# Patient Record
Sex: FEMALE | Race: WHITE | NOT HISPANIC OR LATINO | Employment: OTHER | URBAN - METROPOLITAN AREA
[De-identification: names, ages, dates, MRNs, and addresses within clinical notes are randomized per-mention and may not be internally consistent; named-entity substitution may affect disease eponyms.]

---

## 2017-01-01 ENCOUNTER — APPOINTMENT (OUTPATIENT)
Dept: GENERAL RADIOLOGY | Facility: HOSPITAL | Age: 82
End: 2017-01-01

## 2017-01-01 ENCOUNTER — HOSPITAL ENCOUNTER (INPATIENT)
Facility: HOSPITAL | Age: 82
LOS: 10 days | End: 2017-06-08
Attending: FAMILY MEDICINE | Admitting: FAMILY MEDICINE

## 2017-01-01 ENCOUNTER — APPOINTMENT (OUTPATIENT)
Dept: CT IMAGING | Facility: HOSPITAL | Age: 82
End: 2017-01-01

## 2017-01-01 ENCOUNTER — TELEPHONE (OUTPATIENT)
Dept: NEUROSURGERY | Facility: CLINIC | Age: 82
End: 2017-01-01

## 2017-01-01 ENCOUNTER — APPOINTMENT (OUTPATIENT)
Dept: MRI IMAGING | Facility: HOSPITAL | Age: 82
End: 2017-01-01

## 2017-01-01 ENCOUNTER — OFFICE VISIT (OUTPATIENT)
Dept: NEUROLOGY | Facility: CLINIC | Age: 82
End: 2017-01-01

## 2017-01-01 ENCOUNTER — TELEPHONE (OUTPATIENT)
Dept: INTERNAL MEDICINE | Facility: CLINIC | Age: 82
End: 2017-01-01

## 2017-01-01 ENCOUNTER — OFFICE VISIT (OUTPATIENT)
Dept: INTERNAL MEDICINE | Facility: CLINIC | Age: 82
End: 2017-01-01

## 2017-01-01 ENCOUNTER — HOSPITAL ENCOUNTER (INPATIENT)
Facility: HOSPITAL | Age: 82
LOS: 2 days | Discharge: HOME HEALTH | End: 2017-04-14
Attending: INTERNAL MEDICINE | Admitting: INTERNAL MEDICINE

## 2017-01-01 ENCOUNTER — HOSPITAL ENCOUNTER (OUTPATIENT)
Dept: CT IMAGING | Facility: HOSPITAL | Age: 82
Discharge: HOME | End: 2017-04-12
Attending: INTERNAL MEDICINE

## 2017-01-01 ENCOUNTER — HOSPITAL ENCOUNTER (EMERGENCY)
Facility: HOSPITAL | Age: 82
Discharge: HOME | End: 2017-05-11
Attending: EMERGENCY MEDICINE | Admitting: EMERGENCY MEDICINE

## 2017-01-01 ENCOUNTER — HOSPITAL ENCOUNTER (OUTPATIENT)
Dept: CT IMAGING | Facility: HOSPITAL | Age: 82
Discharge: HOME | End: 2017-01-17
Admitting: INTERNAL MEDICINE

## 2017-01-01 ENCOUNTER — HOSPITAL ENCOUNTER (INPATIENT)
Facility: HOSPITAL | Age: 82
LOS: 3 days | Discharge: NURSING, REHAB OR LONG-TERM CARE | End: 2017-04-20
Attending: EMERGENCY MEDICINE | Admitting: HOSPITALIST

## 2017-01-01 ENCOUNTER — HOSPITAL ENCOUNTER (EMERGENCY)
Facility: HOSPITAL | Age: 82
Discharge: HOME | End: 2017-01-11
Attending: EMERGENCY MEDICINE | Admitting: EMERGENCY MEDICINE

## 2017-01-01 ENCOUNTER — TELEPHONE (OUTPATIENT)
Dept: SOCIAL WORK | Facility: HOSPITAL | Age: 82
End: 2017-01-01

## 2017-01-01 ENCOUNTER — HOSPITAL ENCOUNTER (EMERGENCY)
Facility: HOSPITAL | Age: 82
Discharge: HOME | End: 2017-04-10
Attending: EMERGENCY MEDICINE | Admitting: EMERGENCY MEDICINE

## 2017-01-01 ENCOUNTER — TELEPHONE (OUTPATIENT)
Dept: NEUROLOGY | Facility: CLINIC | Age: 82
End: 2017-01-01

## 2017-01-01 ENCOUNTER — HOSPITAL ENCOUNTER (INPATIENT)
Facility: HOSPITAL | Age: 82
LOS: 4 days | End: 2017-05-29
Attending: EMERGENCY MEDICINE | Admitting: INTERNAL MEDICINE

## 2017-01-01 VITALS
DIASTOLIC BLOOD PRESSURE: 80 MMHG | WEIGHT: 108 LBS | BODY MASS INDEX: 18.44 KG/M2 | HEART RATE: 64 BPM | HEIGHT: 64 IN | OXYGEN SATURATION: 99 % | SYSTOLIC BLOOD PRESSURE: 134 MMHG | RESPIRATION RATE: 18 BRPM

## 2017-01-01 VITALS
HEART RATE: 90 BPM | BODY MASS INDEX: 18.95 KG/M2 | SYSTOLIC BLOOD PRESSURE: 136 MMHG | HEIGHT: 64 IN | WEIGHT: 111 LBS | RESPIRATION RATE: 18 BRPM | OXYGEN SATURATION: 97 % | DIASTOLIC BLOOD PRESSURE: 88 MMHG

## 2017-01-01 VITALS
DIASTOLIC BLOOD PRESSURE: 83 MMHG | HEIGHT: 66 IN | TEMPERATURE: 98 F | RESPIRATION RATE: 16 BRPM | HEART RATE: 81 BPM | SYSTOLIC BLOOD PRESSURE: 129 MMHG | OXYGEN SATURATION: 95 % | WEIGHT: 106 LBS | BODY MASS INDEX: 17.04 KG/M2

## 2017-01-01 VITALS
HEIGHT: 66 IN | RESPIRATION RATE: 18 BRPM | SYSTOLIC BLOOD PRESSURE: 122 MMHG | DIASTOLIC BLOOD PRESSURE: 72 MMHG | OXYGEN SATURATION: 93 % | WEIGHT: 109.3 LBS | TEMPERATURE: 97.8 F | HEART RATE: 106 BPM | BODY MASS INDEX: 17.57 KG/M2

## 2017-01-01 VITALS
OXYGEN SATURATION: 95 % | WEIGHT: 110 LBS | HEIGHT: 66 IN | RESPIRATION RATE: 18 BRPM | TEMPERATURE: 97.8 F | DIASTOLIC BLOOD PRESSURE: 72 MMHG | SYSTOLIC BLOOD PRESSURE: 126 MMHG | BODY MASS INDEX: 17.68 KG/M2 | HEART RATE: 75 BPM

## 2017-01-01 VITALS
SYSTOLIC BLOOD PRESSURE: 124 MMHG | RESPIRATION RATE: 18 BRPM | OXYGEN SATURATION: 97 % | BODY MASS INDEX: 17.68 KG/M2 | HEIGHT: 66 IN | HEART RATE: 89 BPM | WEIGHT: 110 LBS | DIASTOLIC BLOOD PRESSURE: 60 MMHG

## 2017-01-01 VITALS
SYSTOLIC BLOOD PRESSURE: 152 MMHG | BODY MASS INDEX: 16.88 KG/M2 | HEART RATE: 74 BPM | DIASTOLIC BLOOD PRESSURE: 71 MMHG | RESPIRATION RATE: 18 BRPM | WEIGHT: 105 LBS | OXYGEN SATURATION: 97 % | TEMPERATURE: 98 F | HEIGHT: 66 IN

## 2017-01-01 VITALS
RESPIRATION RATE: 16 BRPM | BODY MASS INDEX: 20.89 KG/M2 | DIASTOLIC BLOOD PRESSURE: 70 MMHG | HEIGHT: 66 IN | TEMPERATURE: 97.7 F | OXYGEN SATURATION: 94 % | WEIGHT: 130 LBS | HEART RATE: 68 BPM | SYSTOLIC BLOOD PRESSURE: 122 MMHG

## 2017-01-01 VITALS
WEIGHT: 115 LBS | HEART RATE: 59 BPM | OXYGEN SATURATION: 98 % | HEIGHT: 64 IN | BODY MASS INDEX: 19.63 KG/M2 | DIASTOLIC BLOOD PRESSURE: 80 MMHG | TEMPERATURE: 98.2 F | RESPIRATION RATE: 19 BRPM | SYSTOLIC BLOOD PRESSURE: 156 MMHG

## 2017-01-01 VITALS
SYSTOLIC BLOOD PRESSURE: 128 MMHG | OXYGEN SATURATION: 99 % | WEIGHT: 106 LBS | HEIGHT: 64 IN | DIASTOLIC BLOOD PRESSURE: 70 MMHG | HEART RATE: 75 BPM | BODY MASS INDEX: 18.1 KG/M2

## 2017-01-01 VITALS
TEMPERATURE: 98.6 F | HEART RATE: 93 BPM | SYSTOLIC BLOOD PRESSURE: 122 MMHG | BODY MASS INDEX: 17.04 KG/M2 | DIASTOLIC BLOOD PRESSURE: 82 MMHG | OXYGEN SATURATION: 94 % | RESPIRATION RATE: 16 BRPM | HEIGHT: 66 IN | WEIGHT: 106 LBS

## 2017-01-01 DIAGNOSIS — N18.30 CHRONIC KIDNEY DISEASE, STAGE III (MODERATE) (HCC): ICD-10-CM

## 2017-01-01 DIAGNOSIS — R07.89 RIGHT-SIDED CHEST WALL PAIN: Primary | ICD-10-CM

## 2017-01-01 DIAGNOSIS — S32.10XA CLOSED FRACTURE OF SACRUM, UNSPECIFIED PORTION OF SACRUM, INITIAL ENCOUNTER (HCC): ICD-10-CM

## 2017-01-01 DIAGNOSIS — I65.23 CALCIFICATION OF BOTH CAROTID ARTERIES: ICD-10-CM

## 2017-01-01 DIAGNOSIS — S22.000A THORACIC COMPRESSION FRACTURE, CLOSED, INITIAL ENCOUNTER (HCC): Primary | ICD-10-CM

## 2017-01-01 DIAGNOSIS — F03.90 DEMENTIA WITHOUT BEHAVIORAL DISTURBANCE, UNSPECIFIED DEMENTIA TYPE: ICD-10-CM

## 2017-01-01 DIAGNOSIS — M62.81 MUSCLE WEAKNESS (GENERALIZED): ICD-10-CM

## 2017-01-01 DIAGNOSIS — R53.83 OTHER FATIGUE: ICD-10-CM

## 2017-01-01 DIAGNOSIS — M54.6 CHRONIC THORACIC BACK PAIN, UNSPECIFIED BACK PAIN LATERALITY: Primary | ICD-10-CM

## 2017-01-01 DIAGNOSIS — R74.8 ELEVATED LIPASE: ICD-10-CM

## 2017-01-01 DIAGNOSIS — R10.2 PELVIC PAIN: Primary | ICD-10-CM

## 2017-01-01 DIAGNOSIS — M54.50 ACUTE RIGHT-SIDED LOW BACK PAIN WITHOUT SCIATICA: Primary | ICD-10-CM

## 2017-01-01 DIAGNOSIS — D72.829 LEUKOCYTOSIS, UNSPECIFIED TYPE: ICD-10-CM

## 2017-01-01 DIAGNOSIS — R53.1 WEAKNESS: ICD-10-CM

## 2017-01-01 DIAGNOSIS — R26.81 GAIT INSTABILITY: ICD-10-CM

## 2017-01-01 DIAGNOSIS — N30.01 ACUTE CYSTITIS WITH HEMATURIA: ICD-10-CM

## 2017-01-01 DIAGNOSIS — G31.84 MILD COGNITIVE IMPAIRMENT: ICD-10-CM

## 2017-01-01 DIAGNOSIS — R53.1 GENERALIZED WEAKNESS: ICD-10-CM

## 2017-01-01 DIAGNOSIS — R42 DIZZINESS: Primary | ICD-10-CM

## 2017-01-01 DIAGNOSIS — S61.411A HAND LACERATION, RIGHT, INITIAL ENCOUNTER: ICD-10-CM

## 2017-01-01 DIAGNOSIS — G44.86 CERVICOGENIC HEADACHE: ICD-10-CM

## 2017-01-01 DIAGNOSIS — Z74.09 IMPAIRED FUNCTIONAL MOBILITY, BALANCE, GAIT, AND ENDURANCE: ICD-10-CM

## 2017-01-01 DIAGNOSIS — R73.02 IMPAIRED GLUCOSE TOLERANCE: ICD-10-CM
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
## 2017-01-01 DIAGNOSIS — Z74.09 IMPAIRED MOBILITY AND ADLS: ICD-10-CM

## 2017-01-01 DIAGNOSIS — S22.060A TRAUMATIC COMPRESSION FRACTURE OF T8 THORACIC VERTEBRA, CLOSED, INITIAL ENCOUNTER (HCC): Primary | ICD-10-CM

## 2017-01-01 DIAGNOSIS — R42 DIZZINESS: ICD-10-CM

## 2017-01-01 DIAGNOSIS — J84.10 PULMONARY FIBROSIS (HCC): ICD-10-CM

## 2017-01-01 DIAGNOSIS — R79.89 ELEVATED LFTS: ICD-10-CM

## 2017-01-01 DIAGNOSIS — M54.6 ACUTE BILATERAL THORACIC BACK PAIN: ICD-10-CM

## 2017-01-01 DIAGNOSIS — G89.29 CHRONIC THORACIC BACK PAIN, UNSPECIFIED BACK PAIN LATERALITY: Primary | ICD-10-CM

## 2017-01-01 DIAGNOSIS — R41.82 ALTERED MENTAL STATUS, UNSPECIFIED ALTERED MENTAL STATUS TYPE: Primary | ICD-10-CM

## 2017-01-01 DIAGNOSIS — Z78.9 IMPAIRED MOBILITY AND ADLS: ICD-10-CM

## 2017-01-01 DIAGNOSIS — I77.9 VERTEBRAL ARTERY DISEASE (HCC): Primary | ICD-10-CM

## 2017-01-01 DIAGNOSIS — R10.84 GENERALIZED ABDOMINAL PAIN: ICD-10-CM

## 2017-01-01 DIAGNOSIS — Z00.00 HEALTHCARE MAINTENANCE: Primary | ICD-10-CM
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
## 2017-01-01 DIAGNOSIS — M54.2 NECK PAIN: ICD-10-CM
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
## 2017-01-01 DIAGNOSIS — R07.89 CHEST WALL PAIN: Primary | ICD-10-CM

## 2017-01-01 DIAGNOSIS — R13.10 DYSPHAGIA, UNSPECIFIED TYPE: ICD-10-CM

## 2017-01-01 DIAGNOSIS — S32.592A FRACTURE OF MULTIPLE PUBIC RAMI, LEFT, CLOSED, INITIAL ENCOUNTER (HCC): Primary | ICD-10-CM

## 2017-01-01 LAB
ALBUMIN SERPL-MCNC: 2.5 G/DL (ref 3.5–5.2)
ALBUMIN SERPL-MCNC: 2.6 G/DL (ref 3.5–5.2)
ALBUMIN SERPL-MCNC: 2.6 G/DL (ref 3.5–5.2)
ALBUMIN SERPL-MCNC: 3.1 G/DL (ref 3.5–5.2)
ALBUMIN SERPL-MCNC: 3.3 G/DL (ref 3.5–5.2)
ALBUMIN SERPL-MCNC: 3.4 G/DL (ref 3.2–4.8)
ALBUMIN SERPL-MCNC: 3.5 G/DL (ref 3.2–4.8)
ALBUMIN SERPL-MCNC: 3.5 G/DL (ref 3.5–5.2)
ALBUMIN SERPL-MCNC: 4 G/DL (ref 3.5–5.2)
ALBUMIN/GLOB SERPL: 0.6 G/DL
ALBUMIN/GLOB SERPL: 0.6 G/DL
ALBUMIN/GLOB SERPL: 0.7 G/DL
ALBUMIN/GLOB SERPL: 0.7 G/DL
ALBUMIN/GLOB SERPL: 0.8 G/DL (ref 1.5–2.5)
ALBUMIN/GLOB SERPL: 0.9 G/DL
ALBUMIN/GLOB SERPL: 0.9 G/DL
ALBUMIN/GLOB SERPL: 0.9 G/DL (ref 1.5–2.5)
ALBUMIN/GLOB SERPL: 1.1 G/DL
ALP SERPL-CCNC: 174 U/L (ref 39–117)
ALP SERPL-CCNC: 248 U/L (ref 25–100)
ALP SERPL-CCNC: 269 U/L (ref 39–117)
ALP SERPL-CCNC: 304 U/L (ref 39–117)
ALP SERPL-CCNC: 367 U/L (ref 39–117)
ALP SERPL-CCNC: 520 U/L (ref 39–117)
ALP SERPL-CCNC: 88 U/L (ref 25–100)
ALP SERPL-CCNC: 90 U/L (ref 39–117)
ALP SERPL-CCNC: 91 U/L (ref 39–117)
ALT SERPL W P-5'-P-CCNC: 137 U/L (ref 1–33)
ALT SERPL W P-5'-P-CCNC: 16 U/L (ref 1–33)
ALT SERPL W P-5'-P-CCNC: 16 U/L (ref 7–40)
ALT SERPL W P-5'-P-CCNC: 17 U/L (ref 7–40)
ALT SERPL W P-5'-P-CCNC: 183 U/L (ref 1–33)
ALT SERPL W P-5'-P-CCNC: 276 U/L (ref 1–33)
ALT SERPL W P-5'-P-CCNC: 484 U/L (ref 1–33)
ALT SERPL W P-5'-P-CCNC: 90 U/L (ref 1–33)
ALT SERPL-CCNC: 19 U/L (ref 1–33)
ANION GAP SERPL CALCULATED.3IONS-SCNC: 1 MMOL/L (ref 3–11)
ANION GAP SERPL CALCULATED.3IONS-SCNC: 10.3 MMOL/L
ANION GAP SERPL CALCULATED.3IONS-SCNC: 11.1 MMOL/L
ANION GAP SERPL CALCULATED.3IONS-SCNC: 11.6 MMOL/L
ANION GAP SERPL CALCULATED.3IONS-SCNC: 12 MMOL/L
ANION GAP SERPL CALCULATED.3IONS-SCNC: 12.3 MMOL/L
ANION GAP SERPL CALCULATED.3IONS-SCNC: 12.8 MMOL/L
ANION GAP SERPL CALCULATED.3IONS-SCNC: 8 MMOL/L (ref 3–11)
ANION GAP SERPL CALCULATED.3IONS-SCNC: 9 MMOL/L (ref 3–11)
ANION GAP SERPL CALCULATED.3IONS-SCNC: 9.5 MMOL/L
ANION GAP SERPL CALCULATED.3IONS-SCNC: 9.6 MMOL/L
APTT PPP: 30.6 SECONDS (ref 22.7–35.4)
APTT PPP: 30.9 SECONDS (ref 22.7–35.4)
AST SERPL-CCNC: 203 U/L (ref 1–32)
AST SERPL-CCNC: 21 U/L (ref 1–32)
AST SERPL-CCNC: 26 U/L (ref 0–33)
AST SERPL-CCNC: 27 U/L (ref 1–32)
AST SERPL-CCNC: 36 U/L (ref 0–33)
AST SERPL-CCNC: 52 U/L (ref 1–32)
AST SERPL-CCNC: 543 U/L (ref 1–32)
AST SERPL-CCNC: 83 U/L (ref 1–32)
AST SERPL-CCNC: 83 U/L (ref 1–32)
BACTERIA SPEC AEROBE CULT: ABNORMAL
BACTERIA SPEC AEROBE CULT: NO GROWTH
BACTERIA SPEC AEROBE CULT: NORMAL
BACTERIA UR QL AUTO: ABNORMAL /HPF
BASOPHILS # BLD AUTO: 0.02 10*3/MM3 (ref 0–0.2)
BASOPHILS # BLD AUTO: 0.03 10*3/MM3 (ref 0–0.2)
BASOPHILS # BLD AUTO: 0.04 10*3/MM3 (ref 0–0.2)
BASOPHILS NFR BLD AUTO: 0.1 % (ref 0–1.5)
BASOPHILS NFR BLD AUTO: 0.2 % (ref 0–1)
BASOPHILS NFR BLD AUTO: 0.2 % (ref 0–1)
BASOPHILS NFR BLD AUTO: 0.2 % (ref 0–1.5)
BASOPHILS NFR BLD AUTO: 0.3 % (ref 0–1.5)
BASOPHILS NFR BLD AUTO: 0.4 % (ref 0–1.5)
BILIRUB SERPL-MCNC: 0.3 MG/DL (ref 0.1–1.2)
BILIRUB SERPL-MCNC: 0.4 MG/DL (ref 0.1–1.2)
BILIRUB SERPL-MCNC: 0.4 MG/DL (ref 0.1–1.2)
BILIRUB SERPL-MCNC: 0.4 MG/DL (ref 0.3–1.2)
BILIRUB SERPL-MCNC: 0.5 MG/DL (ref 0.3–1.2)
BILIRUB SERPL-MCNC: 0.9 MG/DL (ref 0.1–1.2)
BILIRUB SERPL-MCNC: 1 MG/DL (ref 0.1–1.2)
BILIRUB SERPL-MCNC: 1.1 MG/DL (ref 0.1–1.2)
BILIRUB SERPL-MCNC: 1.4 MG/DL (ref 0.1–1.2)
BILIRUB UR QL STRIP: ABNORMAL
BILIRUB UR QL STRIP: ABNORMAL
BILIRUB UR QL STRIP: NEGATIVE
BILIRUB UR QL STRIP: NEGATIVE
BNP SERPL-MCNC: 105 PG/ML (ref 0–100)
BUN BLD-MCNC: 14 MG/DL (ref 9–23)
BUN BLD-MCNC: 15 MG/DL (ref 9–23)
BUN BLD-MCNC: 16 MG/DL (ref 9–23)
BUN BLD-MCNC: 17 MG/DL (ref 8–23)
BUN BLD-MCNC: 20 MG/DL (ref 8–23)
BUN BLD-MCNC: 20 MG/DL (ref 8–23)
BUN BLD-MCNC: 22 MG/DL (ref 8–23)
BUN BLD-MCNC: 24 MG/DL (ref 8–23)
BUN BLD-MCNC: 25 MG/DL (ref 8–23)
BUN BLD-MCNC: 25 MG/DL (ref 8–23)
BUN BLD-MCNC: 30 MG/DL (ref 8–23)
BUN SERPL-MCNC: 18 MG/DL (ref 8–23)
BUN/CREAT SERPL: 14.5 (ref 7–25)
BUN/CREAT SERPL: 14.8 (ref 7–25)
BUN/CREAT SERPL: 15.6 (ref 7–25)
BUN/CREAT SERPL: 18.8 (ref 7–25)
BUN/CREAT SERPL: 20.2 (ref 7–25)
BUN/CREAT SERPL: 20.7 (ref 7–25)
BUN/CREAT SERPL: 23 (ref 7–25)
BUN/CREAT SERPL: 23.5 (ref 7–25)
BUN/CREAT SERPL: 25 (ref 7–25)
BUN/CREAT SERPL: 25.6 (ref 7–25)
BUN/CREAT SERPL: 26.3 (ref 7–25)
BUN/CREAT SERPL: 31.3 (ref 7–25)
CALCIUM SERPL-MCNC: 10 MG/DL (ref 8.6–10.5)
CALCIUM SPEC-SCNC: 10 MG/DL (ref 8.7–10.4)
CALCIUM SPEC-SCNC: 8.3 MG/DL (ref 8.6–10.5)
CALCIUM SPEC-SCNC: 8.5 MG/DL (ref 8.6–10.5)
CALCIUM SPEC-SCNC: 8.5 MG/DL (ref 8.7–10.4)
CALCIUM SPEC-SCNC: 8.8 MG/DL (ref 8.6–10.5)
CALCIUM SPEC-SCNC: 9 MG/DL (ref 8.6–10.5)
CALCIUM SPEC-SCNC: 9.2 MG/DL (ref 8.6–10.5)
CALCIUM SPEC-SCNC: 9.3 MG/DL (ref 8.6–10.5)
CALCIUM SPEC-SCNC: 9.4 MG/DL (ref 8.6–10.5)
CALCIUM SPEC-SCNC: 9.5 MG/DL (ref 8.6–10.5)
CALCIUM SPEC-SCNC: 9.6 MG/DL (ref 8.7–10.4)
CHLORIDE SERPL-SCNC: 100 MMOL/L (ref 98–107)
CHLORIDE SERPL-SCNC: 101 MMOL/L (ref 98–107)
CHLORIDE SERPL-SCNC: 103 MMOL/L (ref 99–109)
CHLORIDE SERPL-SCNC: 104 MMOL/L (ref 99–109)
CHLORIDE SERPL-SCNC: 106 MMOL/L (ref 99–109)
CHLORIDE SERPL-SCNC: 96 MMOL/L (ref 98–107)
CHLORIDE SERPL-SCNC: 96 MMOL/L (ref 98–107)
CHLORIDE SERPL-SCNC: 97 MMOL/L (ref 98–107)
CHLORIDE SERPL-SCNC: 98 MMOL/L (ref 98–107)
CHLORIDE SERPL-SCNC: 98 MMOL/L (ref 98–107)
CHLORIDE SERPL-SCNC: 99 MMOL/L (ref 98–107)
CHLORIDE SERPL-SCNC: 99 MMOL/L (ref 98–107)
CK SERPL-CCNC: 48 U/L (ref 20–180)
CLARITY UR: ABNORMAL
CO2 SERPL-SCNC: 23.7 MMOL/L (ref 22–29)
CO2 SERPL-SCNC: 25 MMOL/L (ref 20–31)
CO2 SERPL-SCNC: 26.2 MMOL/L (ref 22–29)
CO2 SERPL-SCNC: 26.4 MMOL/L (ref 22–29)
CO2 SERPL-SCNC: 27 MMOL/L (ref 20–31)
CO2 SERPL-SCNC: 27.7 MMOL/L (ref 22–29)
CO2 SERPL-SCNC: 27.9 MMOL/L (ref 22–29)
CO2 SERPL-SCNC: 28.4 MMOL/L (ref 22–29)
CO2 SERPL-SCNC: 29 MMOL/L (ref 22–29)
CO2 SERPL-SCNC: 30.5 MMOL/L (ref 22–29)
CO2 SERPL-SCNC: 31.2 MMOL/L (ref 22–29)
CO2 SERPL-SCNC: 34 MMOL/L (ref 20–31)
COLOR UR: ABNORMAL
COLOR UR: ABNORMAL
COLOR UR: YELLOW
COLOR UR: YELLOW
CREAT BLD-MCNC: 0.74 MG/DL (ref 0.57–1)
CREAT BLD-MCNC: 0.8 MG/DL (ref 0.57–1)
CREAT BLD-MCNC: 0.8 MG/DL (ref 0.6–1.3)
CREAT BLD-MCNC: 0.86 MG/DL (ref 0.57–1)
CREAT BLD-MCNC: 0.9 MG/DL (ref 0.6–1.3)
CREAT BLD-MCNC: 0.95 MG/DL (ref 0.57–1)
CREAT BLD-MCNC: 0.96 MG/DL (ref 0.57–1)
CREAT BLD-MCNC: 0.99 MG/DL (ref 0.57–1)
CREAT BLD-MCNC: 1.02 MG/DL (ref 0.57–1)
CREAT BLD-MCNC: 1.1 MG/DL (ref 0.6–1.3)
CREAT BLD-MCNC: 1.21 MG/DL (ref 0.57–1)
CREAT BLDA-MCNC: 1.1 MG/DL (ref 0.6–1.3)
CREAT SERPL-MCNC: 1.22 MG/DL (ref 0.57–1)
CRP SERPL-MCNC: 1.39 MG/DL (ref 0–1)
DEPRECATED RDW RBC AUTO: 43.2 FL (ref 37–54)
DEPRECATED RDW RBC AUTO: 44.5 FL (ref 37–54)
DEPRECATED RDW RBC AUTO: 45.1 FL (ref 37–54)
DEPRECATED RDW RBC AUTO: 45.6 FL (ref 37–54)
DEPRECATED RDW RBC AUTO: 45.8 FL (ref 37–54)
DEPRECATED RDW RBC AUTO: 46 FL (ref 37–54)
DEPRECATED RDW RBC AUTO: 47.6 FL (ref 37–54)
DEPRECATED RDW RBC AUTO: 47.7 FL (ref 37–54)
DEPRECATED RDW RBC AUTO: 48 FL (ref 37–54)
DEPRECATED RDW RBC AUTO: 48 FL (ref 37–54)
DEPRECATED RDW RBC AUTO: 48.7 FL (ref 37–54)
EOSINOPHIL # BLD AUTO: 0.01 10*3/MM3 (ref 0–0.7)
EOSINOPHIL # BLD AUTO: 0.02 10*3/MM3 (ref 0.1–0.3)
EOSINOPHIL # BLD AUTO: 0.1 10*3/MM3 (ref 0–0.7)
EOSINOPHIL # BLD AUTO: 0.14 10*3/MM3 (ref 0–0.7)
EOSINOPHIL # BLD AUTO: 0.16 10*3/MM3 (ref 0.1–0.3)
EOSINOPHIL # BLD AUTO: 0.16 10*3/MM3 (ref 0–0.7)
EOSINOPHIL # BLD AUTO: 0.18 10*3/MM3 (ref 0–0.7)
EOSINOPHIL # BLD AUTO: 0.18 10*3/MM3 (ref 0–0.7)
EOSINOPHIL # BLD AUTO: 0.2 10*3/MM3 (ref 0–0.7)
EOSINOPHIL NFR BLD AUTO: 0 % (ref 0.3–6.2)
EOSINOPHIL NFR BLD AUTO: 0.2 % (ref 0–3)
EOSINOPHIL NFR BLD AUTO: 0.6 % (ref 0.3–6.2)
EOSINOPHIL NFR BLD AUTO: 1.6 % (ref 0.3–6.2)
EOSINOPHIL NFR BLD AUTO: 1.7 % (ref 0.3–6.2)
EOSINOPHIL NFR BLD AUTO: 1.7 % (ref 0–3)
EOSINOPHIL NFR BLD AUTO: 1.9 % (ref 0.3–6.2)
EOSINOPHIL NFR BLD AUTO: 2 % (ref 0.3–6.2)
EOSINOPHIL NFR BLD AUTO: 2.1 % (ref 0.3–6.2)
ERYTHROCYTE [DISTWIDTH] IN BLOOD BY AUTOMATED COUNT: 12.5 % (ref 11.7–13)
ERYTHROCYTE [DISTWIDTH] IN BLOOD BY AUTOMATED COUNT: 12.6 % (ref 11.7–13)
ERYTHROCYTE [DISTWIDTH] IN BLOOD BY AUTOMATED COUNT: 12.6 % (ref 11.7–13)
ERYTHROCYTE [DISTWIDTH] IN BLOOD BY AUTOMATED COUNT: 12.8 % (ref 11.7–13)
ERYTHROCYTE [DISTWIDTH] IN BLOOD BY AUTOMATED COUNT: 12.8 % (ref 11.7–13)
ERYTHROCYTE [DISTWIDTH] IN BLOOD BY AUTOMATED COUNT: 13 % (ref 11.7–13)
ERYTHROCYTE [DISTWIDTH] IN BLOOD BY AUTOMATED COUNT: 13.1 % (ref 11.3–14.5)
ERYTHROCYTE [DISTWIDTH] IN BLOOD BY AUTOMATED COUNT: 13.1 % (ref 11.7–13)
ERYTHROCYTE [DISTWIDTH] IN BLOOD BY AUTOMATED COUNT: 13.5 % (ref 11.3–14.5)
ERYTHROCYTE [DISTWIDTH] IN BLOOD BY AUTOMATED COUNT: 13.5 % (ref 11.3–14.5)
ERYTHROCYTE [SEDIMENTATION RATE] IN BLOOD: 33 MM/HR (ref 0–30)
GFR SERPL CREATININE-BSD FRML MDRD: 42 ML/MIN/1.73
GFR SERPL CREATININE-BSD FRML MDRD: 47 ML/MIN/1.73
GFR SERPL CREATININE-BSD FRML MDRD: 51 ML/MIN/1.73
GFR SERPL CREATININE-BSD FRML MDRD: 53 ML/MIN/1.73
GFR SERPL CREATININE-BSD FRML MDRD: 55 ML/MIN/1.73
GFR SERPL CREATININE-BSD FRML MDRD: 56 ML/MIN/1.73
GFR SERPL CREATININE-BSD FRML MDRD: 59 ML/MIN/1.73
GFR SERPL CREATININE-BSD FRML MDRD: 62 ML/MIN/1.73
GFR SERPL CREATININE-BSD FRML MDRD: 68 ML/MIN/1.73
GFR SERPL CREATININE-BSD FRML MDRD: 68 ML/MIN/1.73
GFR SERPL CREATININE-BSD FRML MDRD: 74 ML/MIN/1.73
GLOBULIN SER CALC-MCNC: 3.7 GM/DL
GLOBULIN UR ELPH-MCNC: 3.5 GM/DL
GLOBULIN UR ELPH-MCNC: 3.7 GM/DL
GLOBULIN UR ELPH-MCNC: 3.9 GM/DL
GLOBULIN UR ELPH-MCNC: 3.9 GM/DL
GLOBULIN UR ELPH-MCNC: 4 GM/DL
GLOBULIN UR ELPH-MCNC: 4.1 GM/DL
GLOBULIN UR ELPH-MCNC: 4.2 GM/DL
GLOBULIN UR ELPH-MCNC: 4.4 GM/DL
GLUCOSE BLD-MCNC: 100 MG/DL (ref 65–99)
GLUCOSE BLD-MCNC: 112 MG/DL (ref 70–100)
GLUCOSE BLD-MCNC: 119 MG/DL (ref 70–100)
GLUCOSE BLD-MCNC: 124 MG/DL (ref 65–99)
GLUCOSE BLD-MCNC: 125 MG/DL (ref 65–99)
GLUCOSE BLD-MCNC: 127 MG/DL (ref 65–99)
GLUCOSE BLD-MCNC: 141 MG/DL (ref 65–99)
GLUCOSE BLD-MCNC: 78 MG/DL (ref 65–99)
GLUCOSE BLD-MCNC: 88 MG/DL (ref 70–100)
GLUCOSE BLD-MCNC: 92 MG/DL (ref 65–99)
GLUCOSE BLD-MCNC: 97 MG/DL (ref 65–99)
GLUCOSE SERPL-MCNC: 107 MG/DL (ref 65–99)
GLUCOSE UR STRIP-MCNC: ABNORMAL MG/DL
GLUCOSE UR STRIP-MCNC: NEGATIVE MG/DL
HAV IGM SERPL QL IA: NORMAL
HBA1C MFR BLD: 5.42 % (ref 4.8–5.6)
HBV CORE IGM SERPL QL IA: NORMAL
HBV SURFACE AG SERPL QL IA: NORMAL
HCT VFR BLD AUTO: 30.3 % (ref 35.6–45.5)
HCT VFR BLD AUTO: 30.9 % (ref 35.6–45.5)
HCT VFR BLD AUTO: 32.9 % (ref 34.5–44)
HCT VFR BLD AUTO: 33.4 % (ref 35.6–45.5)
HCT VFR BLD AUTO: 34.8 % (ref 35.6–45.5)
HCT VFR BLD AUTO: 34.9 % (ref 35.6–45.5)
HCT VFR BLD AUTO: 36.7 % (ref 35.6–45.5)
HCT VFR BLD AUTO: 37.3 % (ref 35.6–45.5)
HCT VFR BLD AUTO: 39.4 % (ref 35.6–45.5)
HCT VFR BLD AUTO: 40.4 % (ref 34.5–44)
HCT VFR BLD AUTO: 41.4 % (ref 34.5–44)
HCT VFR BLD AUTO: 41.5 % (ref 35.6–45.5)
HCV AB SER DONR QL: NORMAL
HGB BLD-MCNC: 10.2 G/DL (ref 11.9–15.5)
HGB BLD-MCNC: 10.5 G/DL (ref 11.5–15.5)
HGB BLD-MCNC: 11.1 G/DL (ref 11.9–15.5)
HGB BLD-MCNC: 11.3 G/DL (ref 11.9–15.5)
HGB BLD-MCNC: 11.4 G/DL (ref 11.9–15.5)
HGB BLD-MCNC: 11.8 G/DL (ref 11.9–15.5)
HGB BLD-MCNC: 12 G/DL (ref 11.9–15.5)
HGB BLD-MCNC: 12.6 G/DL (ref 11.9–15.5)
HGB BLD-MCNC: 12.9 G/DL (ref 11.5–15.5)
HGB BLD-MCNC: 13.1 G/DL (ref 11.9–15.5)
HGB BLD-MCNC: 13.3 G/DL (ref 11.5–15.5)
HGB BLD-MCNC: 9.6 G/DL (ref 11.9–15.5)
HGB UR QL STRIP.AUTO: ABNORMAL
HOLD SPECIMEN: NORMAL
HYALINE CASTS UR QL AUTO: ABNORMAL /LPF
IMM GRANULOCYTES # BLD: 0 10*3/MM3 (ref 0–0.03)
IMM GRANULOCYTES # BLD: 0.02 10*3/MM3 (ref 0–0.03)
IMM GRANULOCYTES # BLD: 0.03 10*3/MM3 (ref 0–0.03)
IMM GRANULOCYTES # BLD: 0.03 10*3/MM3 (ref 0–0.03)
IMM GRANULOCYTES # BLD: 0.04 10*3/MM3 (ref 0–0.03)
IMM GRANULOCYTES # BLD: 0.45 10*3/MM3 (ref 0–0.03)
IMM GRANULOCYTES NFR BLD: 0 % (ref 0–0.5)
IMM GRANULOCYTES NFR BLD: 0.2 % (ref 0–0.5)
IMM GRANULOCYTES NFR BLD: 0.2 % (ref 0–0.6)
IMM GRANULOCYTES NFR BLD: 0.2 % (ref 0–0.6)
IMM GRANULOCYTES NFR BLD: 0.3 % (ref 0–0.5)
IMM GRANULOCYTES NFR BLD: 1.3 % (ref 0–0.5)
INR PPP: 1.03
INR PPP: 1.03 (ref 0.9–1.1)
INR PPP: 1.04 (ref 0.9–1.1)
KETONES UR QL STRIP: ABNORMAL
KETONES UR QL STRIP: ABNORMAL
KETONES UR QL STRIP: NEGATIVE
KETONES UR QL STRIP: NEGATIVE
LEUKOCYTE ESTERASE UR QL STRIP.AUTO: ABNORMAL
LIPASE SERPL-CCNC: 46 U/L (ref 6–51)
LIPASE SERPL-CCNC: 53 U/L (ref 6–51)
LIPASE SERPL-CCNC: 68 U/L (ref 13–60)
LYMPHOCYTES # BLD AUTO: 0.72 10*3/MM3 (ref 0.9–4.8)
LYMPHOCYTES # BLD AUTO: 0.78 10*3/MM3 (ref 0.6–4.8)
LYMPHOCYTES # BLD AUTO: 1.3 10*3/MM3 (ref 0.9–4.8)
LYMPHOCYTES # BLD AUTO: 1.35 10*3/MM3 (ref 0.9–4.8)
LYMPHOCYTES # BLD AUTO: 1.38 10*3/MM3 (ref 0.6–4.8)
LYMPHOCYTES # BLD AUTO: 1.41 10*3/MM3 (ref 0.9–4.8)
LYMPHOCYTES # BLD AUTO: 1.42 10*3/MM3 (ref 0.9–4.8)
LYMPHOCYTES # BLD AUTO: 1.48 10*3/MM3 (ref 0.9–4.8)
LYMPHOCYTES # BLD AUTO: 1.66 10*3/MM3 (ref 0.9–4.8)
LYMPHOCYTES NFR BLD AUTO: 12.5 % (ref 19.6–45.3)
LYMPHOCYTES NFR BLD AUTO: 12.5 % (ref 19.6–45.3)
LYMPHOCYTES NFR BLD AUTO: 14.3 % (ref 24–44)
LYMPHOCYTES NFR BLD AUTO: 16.4 % (ref 19.6–45.3)
LYMPHOCYTES NFR BLD AUTO: 18.6 % (ref 19.6–45.3)
LYMPHOCYTES NFR BLD AUTO: 19.3 % (ref 19.6–45.3)
LYMPHOCYTES NFR BLD AUTO: 2 % (ref 19.6–45.3)
LYMPHOCYTES NFR BLD AUTO: 9.1 % (ref 19.6–45.3)
LYMPHOCYTES NFR BLD AUTO: 9.2 % (ref 24–44)
MAGNESIUM SERPL-MCNC: 1.8 MG/DL (ref 1.3–2.7)
MAGNESIUM SERPL-MCNC: 2 MG/DL (ref 1.6–2.4)
MCH RBC QN AUTO: 31.2 PG (ref 26.9–32)
MCH RBC QN AUTO: 31.4 PG (ref 26.9–32)
MCH RBC QN AUTO: 31.4 PG (ref 26.9–32)
MCH RBC QN AUTO: 31.4 PG (ref 27–31)
MCH RBC QN AUTO: 31.5 PG (ref 26.9–32)
MCH RBC QN AUTO: 31.5 PG (ref 27–31)
MCH RBC QN AUTO: 31.6 PG (ref 26.9–32)
MCH RBC QN AUTO: 31.6 PG (ref 26.9–32)
MCH RBC QN AUTO: 31.7 PG (ref 26.9–32)
MCH RBC QN AUTO: 31.9 PG (ref 27–31)
MCH RBC QN AUTO: 32 PG (ref 26.9–32)
MCH RBC QN AUTO: 32.1 PG (ref 26.9–32)
MCHC RBC AUTO-ENTMCNC: 31.6 G/DL (ref 32.4–36.3)
MCHC RBC AUTO-ENTMCNC: 31.7 G/DL (ref 32.4–36.3)
MCHC RBC AUTO-ENTMCNC: 31.9 G/DL (ref 32.4–36.3)
MCHC RBC AUTO-ENTMCNC: 31.9 G/DL (ref 32–36)
MCHC RBC AUTO-ENTMCNC: 31.9 G/DL (ref 32–36)
MCHC RBC AUTO-ENTMCNC: 32 G/DL (ref 32.4–36.3)
MCHC RBC AUTO-ENTMCNC: 32.1 G/DL (ref 32–36)
MCHC RBC AUTO-ENTMCNC: 32.2 G/DL (ref 32.4–36.3)
MCHC RBC AUTO-ENTMCNC: 32.2 G/DL (ref 32.4–36.3)
MCHC RBC AUTO-ENTMCNC: 32.7 G/DL (ref 32.4–36.3)
MCHC RBC AUTO-ENTMCNC: 33 G/DL (ref 32.4–36.3)
MCHC RBC AUTO-ENTMCNC: 33.8 G/DL (ref 32.4–36.3)
MCV RBC AUTO: 94.9 FL (ref 80.5–98.2)
MCV RBC AUTO: 95.6 FL (ref 80.5–98.2)
MCV RBC AUTO: 95.7 FL (ref 80.5–98.2)
MCV RBC AUTO: 97.6 FL (ref 80–99)
MCV RBC AUTO: 98.3 FL (ref 80.5–98.2)
MCV RBC AUTO: 98.4 FL (ref 80.5–98.2)
MCV RBC AUTO: 98.5 FL (ref 80.5–98.2)
MCV RBC AUTO: 98.8 FL (ref 80–99)
MCV RBC AUTO: 99.5 FL (ref 80.5–98.2)
MCV RBC AUTO: 99.5 FL (ref 80.5–98.2)
MCV RBC AUTO: 99.7 FL (ref 80.5–98.2)
MCV RBC AUTO: 99.8 FL (ref 80–99)
MONOCYTES # BLD AUTO: 0.73 10*3/MM3 (ref 0–1)
MONOCYTES # BLD AUTO: 0.93 10*3/MM3 (ref 0.2–1.2)
MONOCYTES # BLD AUTO: 1.08 10*3/MM3 (ref 0.2–1.2)
MONOCYTES # BLD AUTO: 1.21 10*3/MM3 (ref 0.2–1.2)
MONOCYTES # BLD AUTO: 1.44 10*3/MM3 (ref 0–1)
MONOCYTES # BLD AUTO: 1.56 10*3/MM3 (ref 0.2–1.2)
MONOCYTES # BLD AUTO: 1.59 10*3/MM3 (ref 0.2–1.2)
MONOCYTES # BLD AUTO: 1.64 10*3/MM3 (ref 0.2–1.2)
MONOCYTES # BLD AUTO: 2.03 10*3/MM3 (ref 0.2–1.2)
MONOCYTES NFR BLD AUTO: 10 % (ref 5–12)
MONOCYTES NFR BLD AUTO: 11.7 % (ref 5–12)
MONOCYTES NFR BLD AUTO: 12.5 % (ref 5–12)
MONOCYTES NFR BLD AUTO: 14.1 % (ref 5–12)
MONOCYTES NFR BLD AUTO: 14.9 % (ref 0–12)
MONOCYTES NFR BLD AUTO: 15.1 % (ref 5–12)
MONOCYTES NFR BLD AUTO: 15.3 % (ref 5–12)
MONOCYTES NFR BLD AUTO: 5.7 % (ref 5–12)
MONOCYTES NFR BLD AUTO: 8.6 % (ref 0–12)
NEUTROPHILS # BLD AUTO: 12.48 10*3/MM3 (ref 1.9–8.1)
NEUTROPHILS # BLD AUTO: 32.54 10*3/MM3 (ref 1.9–8.1)
NEUTROPHILS # BLD AUTO: 5.35 10*3/MM3 (ref 1.9–8.1)
NEUTROPHILS # BLD AUTO: 5.7 10*3/MM3 (ref 1.9–8.1)
NEUTROPHILS # BLD AUTO: 5.77 10*3/MM3 (ref 1.9–8.1)
NEUTROPHILS # BLD AUTO: 6.65 10*3/MM3 (ref 1.5–8.3)
NEUTROPHILS # BLD AUTO: 6.95 10*3/MM3 (ref 1.5–8.3)
NEUTROPHILS # BLD AUTO: 7.23 10*3/MM3 (ref 1.9–8.1)
NEUTROPHILS # BLD AUTO: 7.62 10*3/MM3 (ref 1.9–8.1)
NEUTROPHILS NFR BLD AUTO: 66.2 % (ref 42.7–76)
NEUTROPHILS NFR BLD AUTO: 67 % (ref 42.7–76)
NEUTROPHILS NFR BLD AUTO: 67.1 % (ref 42.7–76)
NEUTROPHILS NFR BLD AUTO: 68.7 % (ref 41–71)
NEUTROPHILS NFR BLD AUTO: 69.8 % (ref 42.7–76)
NEUTROPHILS NFR BLD AUTO: 70.2 % (ref 42.7–76)
NEUTROPHILS NFR BLD AUTO: 79.8 % (ref 42.7–76)
NEUTROPHILS NFR BLD AUTO: 81.6 % (ref 41–71)
NEUTROPHILS NFR BLD AUTO: 90.9 % (ref 42.7–76)
NITRITE UR QL STRIP: NEGATIVE
NITRITE UR QL STRIP: POSITIVE
NRBC BLD MANUAL-RTO: 0 /100 WBC (ref 0–0)
PH UR STRIP.AUTO: 5.5 [PH] (ref 5–8)
PH UR STRIP.AUTO: 6 [PH] (ref 5–8)
PH UR STRIP.AUTO: 6 [PH] (ref 5–8)
PH UR STRIP.AUTO: <=5 [PH] (ref 5–8)
PLAT MORPH BLD: NORMAL
PLATELET # BLD AUTO: 138 10*3/MM3 (ref 140–500)
PLATELET # BLD AUTO: 145 10*3/MM3 (ref 140–500)
PLATELET # BLD AUTO: 171 10*3/MM3 (ref 140–500)
PLATELET # BLD AUTO: 187 10*3/MM3 (ref 150–450)
PLATELET # BLD AUTO: 192 10*3/MM3 (ref 140–500)
PLATELET # BLD AUTO: 194 10*3/MM3 (ref 140–500)
PLATELET # BLD AUTO: 196 10*3/MM3 (ref 140–500)
PLATELET # BLD AUTO: 206 10*3/MM3 (ref 140–500)
PLATELET # BLD AUTO: 221 10*3/MM3 (ref 150–450)
PLATELET # BLD AUTO: 227 10*3/MM3 (ref 140–500)
PLATELET # BLD AUTO: 237 10*3/MM3 (ref 150–450)
PLATELET # BLD AUTO: 253 10*3/MM3 (ref 140–500)
PMV BLD AUTO: 10 FL (ref 6–12)
PMV BLD AUTO: 10 FL (ref 6–12)
PMV BLD AUTO: 10.2 FL (ref 6–12)
PMV BLD AUTO: 10.6 FL (ref 6–12)
PMV BLD AUTO: 8.9 FL (ref 6–12)
PMV BLD AUTO: 9.4 FL (ref 6–12)
PMV BLD AUTO: 9.5 FL (ref 6–12)
PMV BLD AUTO: 9.6 FL (ref 6–12)
PMV BLD AUTO: 9.6 FL (ref 6–12)
PMV BLD AUTO: 9.7 FL (ref 6–12)
PMV BLD AUTO: 9.9 FL (ref 6–12)
POTASSIUM BLD-SCNC: 3.6 MMOL/L (ref 3.5–5.5)
POTASSIUM BLD-SCNC: 3.7 MMOL/L (ref 3.5–5.2)
POTASSIUM BLD-SCNC: 3.8 MMOL/L (ref 3.5–5.2)
POTASSIUM BLD-SCNC: 3.8 MMOL/L (ref 3.5–5.2)
POTASSIUM BLD-SCNC: 3.9 MMOL/L (ref 3.5–5.2)
POTASSIUM BLD-SCNC: 3.9 MMOL/L (ref 3.5–5.5)
POTASSIUM BLD-SCNC: 4.1 MMOL/L (ref 3.5–5.2)
POTASSIUM BLD-SCNC: 4.1 MMOL/L (ref 3.5–5.2)
POTASSIUM BLD-SCNC: 4.2 MMOL/L (ref 3.5–5.2)
POTASSIUM BLD-SCNC: 4.2 MMOL/L (ref 3.5–5.5)
POTASSIUM BLD-SCNC: 4.3 MMOL/L (ref 3.5–5.2)
POTASSIUM BLD-SCNC: 4.4 MMOL/L (ref 3.5–5.2)
POTASSIUM SERPL-SCNC: 4.7 MMOL/L (ref 3.5–5.2)
PROCALCITONIN SERPL-MCNC: 9.56 NG/ML (ref 0.1–0.25)
PROT SERPL-MCNC: 6.1 G/DL (ref 6–8.5)
PROT SERPL-MCNC: 6.4 G/DL (ref 6–8.5)
PROT SERPL-MCNC: 7 G/DL (ref 6–8.5)
PROT SERPL-MCNC: 7 G/DL (ref 6–8.5)
PROT SERPL-MCNC: 7.3 G/DL (ref 6–8.5)
PROT SERPL-MCNC: 7.4 G/DL (ref 5.7–8.2)
PROT SERPL-MCNC: 7.5 G/DL (ref 5.7–8.2)
PROT SERPL-MCNC: 7.5 G/DL (ref 6–8.5)
PROT SERPL-MCNC: 7.7 G/DL (ref 6–8.5)
PROT UR QL STRIP: ABNORMAL
PROTHROMBIN TIME: 11.2 SECONDS (ref 9.6–11.5)
PROTHROMBIN TIME: 13.1 SECONDS (ref 11.7–14.2)
PROTHROMBIN TIME: 13.2 SECONDS (ref 11.7–14.2)
RBC # BLD AUTO: 3.04 10*6/MM3 (ref 3.9–5.2)
RBC # BLD AUTO: 3.23 10*6/MM3 (ref 3.9–5.2)
RBC # BLD AUTO: 3.33 10*6/MM3 (ref 3.89–5.14)
RBC # BLD AUTO: 3.52 10*6/MM3 (ref 3.9–5.2)
RBC # BLD AUTO: 3.54 10*6/MM3 (ref 3.9–5.2)
RBC # BLD AUTO: 3.65 10*6/MM3 (ref 3.9–5.2)
RBC # BLD AUTO: 3.69 10*6/MM3 (ref 3.9–5.2)
RBC # BLD AUTO: 3.79 10*6/MM3 (ref 3.9–5.2)
RBC # BLD AUTO: 4 10*6/MM3 (ref 3.9–5.2)
RBC # BLD AUTO: 4.05 10*6/MM3 (ref 3.89–5.14)
RBC # BLD AUTO: 4.17 10*6/MM3 (ref 3.9–5.2)
RBC # BLD AUTO: 4.24 10*6/MM3 (ref 3.89–5.14)
RBC # UR: ABNORMAL /HPF
RBC MORPH BLD: NORMAL
REF LAB TEST METHOD: ABNORMAL
SODIUM BLD-SCNC: 134 MMOL/L (ref 136–145)
SODIUM BLD-SCNC: 136 MMOL/L (ref 136–145)
SODIUM BLD-SCNC: 137 MMOL/L (ref 136–145)
SODIUM BLD-SCNC: 137 MMOL/L (ref 136–145)
SODIUM BLD-SCNC: 138 MMOL/L (ref 132–146)
SODIUM BLD-SCNC: 139 MMOL/L (ref 132–146)
SODIUM BLD-SCNC: 139 MMOL/L (ref 136–145)
SODIUM BLD-SCNC: 140 MMOL/L (ref 132–146)
SODIUM BLD-SCNC: 140 MMOL/L (ref 136–145)
SODIUM SERPL-SCNC: 139 MMOL/L (ref 136–145)
SP GR UR STRIP: 1.02 (ref 1–1.03)
SQUAMOUS #/AREA URNS HPF: ABNORMAL /HPF
TROPONIN I SERPL-MCNC: 0.04 NG/ML (ref 0–0.07)
TROPONIN I SERPL-MCNC: 0.06 NG/ML
TROPONIN T SERPL-MCNC: <0.01 NG/ML (ref 0–0.03)
TSH SERPL DL<=0.005 MIU/L-ACNC: 2.33 MIU/ML (ref 0.27–4.2)
UROBILINOGEN UR QL STRIP: ABNORMAL
VIT B12 SERPL-MCNC: 669 PG/ML (ref 211–946)
WBC # BLD AUTO: 8.6 10*3/MM3 (ref 4.5–10.7)
WBC MORPH BLD: NORMAL
WBC NRBC COR # BLD: 10.37 10*3/MM3 (ref 4.5–10.7)
WBC NRBC COR # BLD: 10.84 10*3/MM3 (ref 4.5–10.7)
WBC NRBC COR # BLD: 14.97 10*3/MM3 (ref 4.5–10.7)
WBC NRBC COR # BLD: 15.63 10*3/MM3 (ref 4.5–10.7)
WBC NRBC COR # BLD: 22.75 10*3/MM3 (ref 4.5–10.7)
WBC NRBC COR # BLD: 35.79 10*3/MM3 (ref 4.5–10.7)
WBC NRBC COR # BLD: 7.97 10*3/MM3 (ref 4.5–10.7)
WBC NRBC COR # BLD: 8.52 10*3/MM3 (ref 3.5–10.8)
WBC NRBC COR # BLD: 8.62 10*3/MM3 (ref 4.5–10.7)
WBC NRBC COR # BLD: 8.7 10*3/MM3 (ref 3.5–10.8)
WBC NRBC COR # BLD: 9.67 10*3/MM3 (ref 3.5–10.8)
WBC UR QL AUTO: ABNORMAL /HPF
WHOLE BLOOD HOLD SPECIMEN: NORMAL

## 2017-01-01 PROCEDURE — 85025 COMPLETE CBC W/AUTO DIFF WBC: CPT | Performed by: INTERNAL MEDICINE

## 2017-01-01 PROCEDURE — 85730 THROMBOPLASTIN TIME PARTIAL: CPT | Performed by: EMERGENCY MEDICINE

## 2017-01-01 PROCEDURE — 71250 CT THORAX DX C-: CPT

## 2017-01-01 PROCEDURE — 25010000002 MORPHINE SULFATE (PF) 2 MG/ML SOLUTION: Performed by: FAMILY MEDICINE

## 2017-01-01 PROCEDURE — 99231 SBSQ HOSP IP/OBS SF/LOW 25: CPT | Performed by: NURSE PRACTITIONER

## 2017-01-01 PROCEDURE — L0464 TLSO 4MOD SACRO-SCAP PRE: HCPCS

## 2017-01-01 PROCEDURE — 80053 COMPREHEN METABOLIC PANEL: CPT | Performed by: EMERGENCY MEDICINE

## 2017-01-01 PROCEDURE — 70450 CT HEAD/BRAIN W/O DYE: CPT

## 2017-01-01 PROCEDURE — 80053 COMPREHEN METABOLIC PANEL: CPT | Performed by: HOSPITALIST

## 2017-01-01 PROCEDURE — 25010000002 MORPHINE SULFATE (PF) 2 MG/ML SOLUTION: Performed by: NURSE PRACTITIONER

## 2017-01-01 PROCEDURE — 25010000002 LORAZEPAM PER 2 MG: Performed by: HOSPITALIST

## 2017-01-01 PROCEDURE — 94640 AIRWAY INHALATION TREATMENT: CPT

## 2017-01-01 PROCEDURE — 87086 URINE CULTURE/COLONY COUNT: CPT | Performed by: EMERGENCY MEDICINE

## 2017-01-01 PROCEDURE — 0 IOPAMIDOL 61 % SOLUTION: Performed by: INTERNAL MEDICINE

## 2017-01-01 PROCEDURE — 99232 SBSQ HOSP IP/OBS MODERATE 35: CPT | Performed by: HOSPITALIST

## 2017-01-01 PROCEDURE — 97110 THERAPEUTIC EXERCISES: CPT

## 2017-01-01 PROCEDURE — 82550 ASSAY OF CK (CPK): CPT | Performed by: INTERNAL MEDICINE

## 2017-01-01 PROCEDURE — 72146 MRI CHEST SPINE W/O DYE: CPT

## 2017-01-01 PROCEDURE — 97163 PT EVAL HIGH COMPLEX 45 MIN: CPT

## 2017-01-01 PROCEDURE — 72148 MRI LUMBAR SPINE W/O DYE: CPT

## 2017-01-01 PROCEDURE — 83735 ASSAY OF MAGNESIUM: CPT | Performed by: EMERGENCY MEDICINE

## 2017-01-01 PROCEDURE — 85027 COMPLETE CBC AUTOMATED: CPT | Performed by: HOSPITALIST

## 2017-01-01 PROCEDURE — 71010 HC CHEST PA OR AP: CPT

## 2017-01-01 PROCEDURE — 85027 COMPLETE CBC AUTOMATED: CPT | Performed by: NURSE PRACTITIONER

## 2017-01-01 PROCEDURE — 99283 EMERGENCY DEPT VISIT LOW MDM: CPT

## 2017-01-01 PROCEDURE — 25010000002 HEPARIN (PORCINE) PER 1000 UNITS: Performed by: HOSPITALIST

## 2017-01-01 PROCEDURE — 84484 ASSAY OF TROPONIN QUANT: CPT

## 2017-01-01 PROCEDURE — 99221 1ST HOSP IP/OBS SF/LOW 40: CPT | Performed by: PHYSICIAN ASSISTANT

## 2017-01-01 PROCEDURE — 80074 ACUTE HEPATITIS PANEL: CPT | Performed by: HOSPITALIST

## 2017-01-01 PROCEDURE — 83880 ASSAY OF NATRIURETIC PEPTIDE: CPT | Performed by: EMERGENCY MEDICINE

## 2017-01-01 PROCEDURE — 0HQFXZZ REPAIR RIGHT HAND SKIN, EXTERNAL APPROACH: ICD-10-PCS | Performed by: EMERGENCY MEDICINE

## 2017-01-01 PROCEDURE — 87040 BLOOD CULTURE FOR BACTERIA: CPT | Performed by: EMERGENCY MEDICINE

## 2017-01-01 PROCEDURE — 25010000002 MORPHINE SULFATE (PF) 2 MG/ML SOLUTION

## 2017-01-01 PROCEDURE — 72072 X-RAY EXAM THORAC SPINE 3VWS: CPT | Performed by: GENERAL PRACTICE

## 2017-01-01 PROCEDURE — 72131 CT LUMBAR SPINE W/O DYE: CPT

## 2017-01-01 PROCEDURE — 99284 EMERGENCY DEPT VISIT MOD MDM: CPT

## 2017-01-01 PROCEDURE — 71101 X-RAY EXAM UNILAT RIBS/CHEST: CPT | Performed by: GENERAL PRACTICE

## 2017-01-01 PROCEDURE — 85652 RBC SED RATE AUTOMATED: CPT | Performed by: EMERGENCY MEDICINE

## 2017-01-01 PROCEDURE — 25010000002 ENOXAPARIN PER 10 MG: Performed by: INTERNAL MEDICINE

## 2017-01-01 PROCEDURE — 80053 COMPREHEN METABOLIC PANEL: CPT | Performed by: PHYSICIAN ASSISTANT

## 2017-01-01 PROCEDURE — 25010000002 HYDRALAZINE PER 20 MG: Performed by: NURSE PRACTITIONER

## 2017-01-01 PROCEDURE — 85007 BL SMEAR W/DIFF WBC COUNT: CPT | Performed by: EMERGENCY MEDICINE

## 2017-01-01 PROCEDURE — 25010000002 HEPARIN (PORCINE) PER 1000 UNITS: Performed by: FAMILY MEDICINE

## 2017-01-01 PROCEDURE — 83690 ASSAY OF LIPASE: CPT | Performed by: EMERGENCY MEDICINE

## 2017-01-01 PROCEDURE — 87186 SC STD MICRODIL/AGAR DIL: CPT | Performed by: EMERGENCY MEDICINE

## 2017-01-01 PROCEDURE — 99233 SBSQ HOSP IP/OBS HIGH 50: CPT | Performed by: HOSPITALIST

## 2017-01-01 PROCEDURE — 87077 CULTURE AEROBIC IDENTIFY: CPT | Performed by: EMERGENCY MEDICINE

## 2017-01-01 PROCEDURE — 81001 URINALYSIS AUTO W/SCOPE: CPT | Performed by: INTERNAL MEDICINE

## 2017-01-01 PROCEDURE — 99214 OFFICE O/P EST MOD 30 MIN: CPT | Performed by: INTERNAL MEDICINE

## 2017-01-01 PROCEDURE — 25010000002 MORPHINE SULFATE (PF) 2 MG/ML SOLUTION: Performed by: EMERGENCY MEDICINE

## 2017-01-01 PROCEDURE — 25010000002 LORAZEPAM PER 2 MG: Performed by: FAMILY MEDICINE

## 2017-01-01 PROCEDURE — 81001 URINALYSIS AUTO W/SCOPE: CPT | Performed by: EMERGENCY MEDICINE

## 2017-01-01 PROCEDURE — 25010000002 MORPHINE PER 10 MG: Performed by: EMERGENCY MEDICINE

## 2017-01-01 PROCEDURE — 85025 COMPLETE CBC W/AUTO DIFF WBC: CPT | Performed by: EMERGENCY MEDICINE

## 2017-01-01 PROCEDURE — 97166 OT EVAL MOD COMPLEX 45 MIN: CPT

## 2017-01-01 PROCEDURE — 74176 CT ABD & PELVIS W/O CONTRAST: CPT

## 2017-01-01 PROCEDURE — 87086 URINE CULTURE/COLONY COUNT: CPT | Performed by: PHYSICIAN ASSISTANT

## 2017-01-01 PROCEDURE — 86140 C-REACTIVE PROTEIN: CPT | Performed by: EMERGENCY MEDICINE

## 2017-01-01 PROCEDURE — 94799 UNLISTED PULMONARY SVC/PX: CPT

## 2017-01-01 PROCEDURE — 80048 BASIC METABOLIC PNL TOTAL CA: CPT | Performed by: NURSE PRACTITIONER

## 2017-01-01 PROCEDURE — 25010000002 ONDANSETRON PER 1 MG: Performed by: PHYSICIAN ASSISTANT

## 2017-01-01 PROCEDURE — 25510000001 DIATRIZOATE MEGLUMINE & SODIUM PER 1 ML: Performed by: INTERNAL MEDICINE

## 2017-01-01 PROCEDURE — 92610 EVALUATE SWALLOWING FUNCTION: CPT

## 2017-01-01 PROCEDURE — 84132 ASSAY OF SERUM POTASSIUM: CPT | Performed by: HOSPITALIST

## 2017-01-01 PROCEDURE — 73502 X-RAY EXAM HIP UNI 2-3 VIEWS: CPT

## 2017-01-01 PROCEDURE — 72072 X-RAY EXAM THORAC SPINE 3VWS: CPT | Performed by: FAMILY MEDICINE

## 2017-01-01 PROCEDURE — 99239 HOSP IP/OBS DSCHRG MGMT >30: CPT | Performed by: HOSPITALIST

## 2017-01-01 PROCEDURE — 97162 PT EVAL MOD COMPLEX 30 MIN: CPT

## 2017-01-01 PROCEDURE — 93005 ELECTROCARDIOGRAM TRACING: CPT | Performed by: HOSPITALIST

## 2017-01-01 PROCEDURE — 25010000003 CEFTRIAXONE PER 250 MG: Performed by: EMERGENCY MEDICINE

## 2017-01-01 PROCEDURE — 36415 COLL VENOUS BLD VENIPUNCTURE: CPT

## 2017-01-01 PROCEDURE — 97161 PT EVAL LOW COMPLEX 20 MIN: CPT

## 2017-01-01 PROCEDURE — 87086 URINE CULTURE/COLONY COUNT: CPT | Performed by: INTERNAL MEDICINE

## 2017-01-01 PROCEDURE — 74177 CT ABD & PELVIS W/CONTRAST: CPT

## 2017-01-01 PROCEDURE — 85025 COMPLETE CBC W/AUTO DIFF WBC: CPT | Performed by: HOSPITALIST

## 2017-01-01 PROCEDURE — 85610 PROTHROMBIN TIME: CPT | Performed by: EMERGENCY MEDICINE

## 2017-01-01 PROCEDURE — 93010 ELECTROCARDIOGRAM REPORT: CPT | Performed by: INTERNAL MEDICINE

## 2017-01-01 PROCEDURE — 82565 ASSAY OF CREATININE: CPT

## 2017-01-01 PROCEDURE — 85025 COMPLETE CBC W/AUTO DIFF WBC: CPT | Performed by: PHYSICIAN ASSISTANT

## 2017-01-01 PROCEDURE — 25010000002 LORAZEPAM PER 2 MG: Performed by: NURSE PRACTITIONER

## 2017-01-01 PROCEDURE — 85730 THROMBOPLASTIN TIME PARTIAL: CPT | Performed by: INTERNAL MEDICINE

## 2017-01-01 PROCEDURE — 99223 1ST HOSP IP/OBS HIGH 75: CPT | Performed by: INTERNAL MEDICINE

## 2017-01-01 PROCEDURE — 80048 BASIC METABOLIC PNL TOTAL CA: CPT | Performed by: INTERNAL MEDICINE

## 2017-01-01 PROCEDURE — 72192 CT PELVIS W/O DYE: CPT

## 2017-01-01 PROCEDURE — 93005 ELECTROCARDIOGRAM TRACING: CPT | Performed by: EMERGENCY MEDICINE

## 2017-01-01 PROCEDURE — 84484 ASSAY OF TROPONIN QUANT: CPT | Performed by: EMERGENCY MEDICINE

## 2017-01-01 PROCEDURE — 81001 URINALYSIS AUTO W/SCOPE: CPT | Performed by: PHYSICIAN ASSISTANT

## 2017-01-01 PROCEDURE — 80053 COMPREHEN METABOLIC PANEL: CPT | Performed by: INTERNAL MEDICINE

## 2017-01-01 PROCEDURE — 36415 COLL VENOUS BLD VENIPUNCTURE: CPT | Performed by: HOSPITALIST

## 2017-01-01 PROCEDURE — 25010000002 ONDANSETRON PER 1 MG: Performed by: EMERGENCY MEDICINE

## 2017-01-01 PROCEDURE — 84145 PROCALCITONIN (PCT): CPT | Performed by: HOSPITALIST

## 2017-01-01 PROCEDURE — 99214 OFFICE O/P EST MOD 30 MIN: CPT | Performed by: PSYCHIATRY & NEUROLOGY

## 2017-01-01 PROCEDURE — 97165 OT EVAL LOW COMPLEX 30 MIN: CPT | Performed by: OCCUPATIONAL THERAPIST

## 2017-01-01 PROCEDURE — 85610 PROTHROMBIN TIME: CPT | Performed by: INTERNAL MEDICINE

## 2017-01-01 PROCEDURE — 96374 THER/PROPH/DIAG INJ IV PUSH: CPT

## 2017-01-01 PROCEDURE — 96375 TX/PRO/DX INJ NEW DRUG ADDON: CPT

## 2017-01-01 PROCEDURE — 71020 HC CHEST PA AND LATERAL: CPT

## 2017-01-01 PROCEDURE — 83690 ASSAY OF LIPASE: CPT | Performed by: PHYSICIAN ASSISTANT

## 2017-01-01 PROCEDURE — 83735 ASSAY OF MAGNESIUM: CPT | Performed by: HOSPITALIST

## 2017-01-01 RX ORDER — BISACODYL 10 MG
10 SUPPOSITORY, RECTAL RECTAL DAILY PRN
Status: DISCONTINUED | OUTPATIENT
Start: 2017-01-01 | End: 2017-01-01 | Stop reason: HOSPADM

## 2017-01-01 RX ORDER — LORAZEPAM 2 MG/ML
0.5 INJECTION INTRAMUSCULAR EVERY 4 HOURS PRN
Status: CANCELLED | OUTPATIENT
Start: 2017-01-01 | End: 2017-01-01

## 2017-01-01 RX ORDER — QUETIAPINE FUMARATE 25 MG/1
25 TABLET, FILM COATED ORAL NIGHTLY PRN
Status: CANCELLED | OUTPATIENT
Start: 2017-01-01

## 2017-01-01 RX ORDER — LACTULOSE 10 G/15ML
20 SOLUTION ORAL DAILY PRN
Status: DISCONTINUED | OUTPATIENT
Start: 2017-01-01 | End: 2017-01-01

## 2017-01-01 RX ORDER — QUETIAPINE FUMARATE 25 MG/1
25 TABLET, FILM COATED ORAL NIGHTLY PRN
Status: DISCONTINUED | OUTPATIENT
Start: 2017-01-01 | End: 2017-01-01

## 2017-01-01 RX ORDER — SODIUM CHLORIDE 0.9 % (FLUSH) 0.9 %
10 SYRINGE (ML) INJECTION AS NEEDED
Status: DISCONTINUED | OUTPATIENT
Start: 2017-01-01 | End: 2017-01-01 | Stop reason: HOSPADM

## 2017-01-01 RX ORDER — SODIUM CHLORIDE 0.9 % (FLUSH) 0.9 %
1-10 SYRINGE (ML) INJECTION AS NEEDED
Status: DISCONTINUED | OUTPATIENT
Start: 2017-01-01 | End: 2017-01-01 | Stop reason: HOSPADM

## 2017-01-01 RX ORDER — LORAZEPAM 2 MG/ML
0.25 INJECTION INTRAMUSCULAR ONCE
Status: COMPLETED | OUTPATIENT
Start: 2017-01-01 | End: 2017-01-01

## 2017-01-01 RX ORDER — LORAZEPAM 2 MG/ML
0.25 INJECTION INTRAMUSCULAR EVERY 8 HOURS SCHEDULED
Status: DISCONTINUED | OUTPATIENT
Start: 2017-01-01 | End: 2017-01-01

## 2017-01-01 RX ORDER — RIVASTIGMINE 4.6 MG/24H
1 PATCH, EXTENDED RELEASE TRANSDERMAL DAILY
Status: DISCONTINUED | OUTPATIENT
Start: 2017-01-01 | End: 2017-01-01 | Stop reason: HOSPADM

## 2017-01-01 RX ORDER — ONDANSETRON 2 MG/ML
4 INJECTION INTRAMUSCULAR; INTRAVENOUS ONCE
Status: COMPLETED | OUTPATIENT
Start: 2017-01-01 | End: 2017-01-01

## 2017-01-01 RX ORDER — ESCITALOPRAM OXALATE 20 MG/1
10 TABLET ORAL DAILY
Status: DISCONTINUED | OUTPATIENT
Start: 2017-01-01 | End: 2017-01-01 | Stop reason: HOSPADM

## 2017-01-01 RX ORDER — ESCITALOPRAM OXALATE 10 MG/1
10 TABLET ORAL DAILY
COMMUNITY

## 2017-01-01 RX ORDER — ONDANSETRON 2 MG/ML
4 INJECTION INTRAMUSCULAR; INTRAVENOUS EVERY 6 HOURS PRN
Status: DISCONTINUED | OUTPATIENT
Start: 2017-01-01 | End: 2017-01-01 | Stop reason: HOSPADM

## 2017-01-01 RX ORDER — MORPHINE SULFATE 2 MG/ML
2 INJECTION, SOLUTION INTRAMUSCULAR; INTRAVENOUS ONCE
Status: COMPLETED | OUTPATIENT
Start: 2017-01-01 | End: 2017-01-01

## 2017-01-01 RX ORDER — MORPHINE SULFATE 100 MG/5ML
5 SOLUTION ORAL EVERY 4 HOURS PRN
Status: CANCELLED | OUTPATIENT
Start: 2017-01-01 | End: 2017-01-01

## 2017-01-01 RX ORDER — MORPHINE SULFATE 2 MG/ML
1 INJECTION, SOLUTION INTRAMUSCULAR; INTRAVENOUS
Status: CANCELLED | OUTPATIENT
Start: 2017-01-01 | End: 2017-01-01

## 2017-01-01 RX ORDER — MORPHINE SULFATE 2 MG/ML
1 INJECTION, SOLUTION INTRAMUSCULAR; INTRAVENOUS ONCE
Status: COMPLETED | OUTPATIENT
Start: 2017-01-01 | End: 2017-01-01

## 2017-01-01 RX ORDER — LISINOPRIL 10 MG/1
10 TABLET ORAL
Status: DISCONTINUED | OUTPATIENT
Start: 2017-01-01 | End: 2017-01-01 | Stop reason: HOSPADM

## 2017-01-01 RX ORDER — SENNA AND DOCUSATE SODIUM 50; 8.6 MG/1; MG/1
2 TABLET, FILM COATED ORAL 2 TIMES DAILY PRN
Status: DISCONTINUED | OUTPATIENT
Start: 2017-01-01 | End: 2017-01-01 | Stop reason: HOSPADM

## 2017-01-01 RX ORDER — MORPHINE SULFATE 2 MG/ML
2 INJECTION, SOLUTION INTRAMUSCULAR; INTRAVENOUS EVERY 4 HOURS
Status: DISCONTINUED | OUTPATIENT
Start: 2017-01-01 | End: 2017-01-01 | Stop reason: HOSPADM

## 2017-01-01 RX ORDER — ESCITALOPRAM OXALATE 20 MG/1
10 TABLET ORAL DAILY
Status: CANCELLED | OUTPATIENT
Start: 2017-01-01

## 2017-01-01 RX ORDER — CALCIUM CARBONATE 200(500)MG
1 TABLET,CHEWABLE ORAL 2 TIMES DAILY PRN
Status: DISCONTINUED | OUTPATIENT
Start: 2017-01-01 | End: 2017-01-01 | Stop reason: HOSPADM

## 2017-01-01 RX ORDER — QUETIAPINE FUMARATE 25 MG/1
25 TABLET, FILM COATED ORAL NIGHTLY PRN
Status: DISCONTINUED | OUTPATIENT
Start: 2017-01-01 | End: 2017-01-01 | Stop reason: HOSPADM

## 2017-01-01 RX ORDER — SENNOSIDES 8.6 MG
650 CAPSULE ORAL EVERY 8 HOURS PRN
COMMUNITY

## 2017-01-01 RX ORDER — MORPHINE SULFATE 2 MG/ML
2 INJECTION, SOLUTION INTRAMUSCULAR; INTRAVENOUS
Status: DISCONTINUED | OUTPATIENT
Start: 2017-01-01 | End: 2017-01-01 | Stop reason: HOSPADM

## 2017-01-01 RX ORDER — TRAMADOL HYDROCHLORIDE 50 MG/1
50 TABLET ORAL EVERY 8 HOURS PRN
Qty: 20 TABLET | Refills: 0 | Status: SHIPPED | OUTPATIENT
Start: 2017-01-01

## 2017-01-01 RX ORDER — MORPHINE SULFATE 100 MG/5ML
5 SOLUTION ORAL EVERY 4 HOURS PRN
Status: DISCONTINUED | OUTPATIENT
Start: 2017-01-01 | End: 2017-01-01 | Stop reason: HOSPADM

## 2017-01-01 RX ORDER — SODIUM CHLORIDE 9 MG/ML
125 INJECTION, SOLUTION INTRAVENOUS CONTINUOUS
Status: DISCONTINUED | OUTPATIENT
Start: 2017-01-01 | End: 2017-01-01

## 2017-01-01 RX ORDER — ACETAMINOPHEN 325 MG/1
650 TABLET ORAL EVERY 4 HOURS PRN
Status: DISCONTINUED | OUTPATIENT
Start: 2017-01-01 | End: 2017-01-01

## 2017-01-01 RX ORDER — LORAZEPAM 2 MG/ML
0.5 INJECTION INTRAMUSCULAR
Status: DISCONTINUED | OUTPATIENT
Start: 2017-01-01 | End: 2017-01-01 | Stop reason: HOSPADM

## 2017-01-01 RX ORDER — LORAZEPAM 2 MG/ML
0.5 INJECTION INTRAMUSCULAR EVERY 4 HOURS PRN
Status: DISCONTINUED | OUTPATIENT
Start: 2017-01-01 | End: 2017-01-01

## 2017-01-01 RX ORDER — FAMOTIDINE 20 MG/1
20 TABLET, FILM COATED ORAL 2 TIMES DAILY
Status: DISCONTINUED | OUTPATIENT
Start: 2017-01-01 | End: 2017-01-01

## 2017-01-01 RX ORDER — QUETIAPINE FUMARATE 25 MG/1
25 TABLET, FILM COATED ORAL NIGHTLY PRN
COMMUNITY

## 2017-01-01 RX ORDER — SODIUM CHLORIDE 0.9 % (FLUSH) 0.9 %
1-10 SYRINGE (ML) INJECTION AS NEEDED
Status: CANCELLED | OUTPATIENT
Start: 2017-01-01

## 2017-01-01 RX ORDER — LISINOPRIL 10 MG/1
10 TABLET ORAL
Status: DISCONTINUED | OUTPATIENT
Start: 2017-01-01 | End: 2017-01-01

## 2017-01-01 RX ORDER — ASPIRIN 81 MG/1
81 TABLET ORAL DAILY
Status: DISCONTINUED | OUTPATIENT
Start: 2017-01-01 | End: 2017-01-01 | Stop reason: HOSPADM

## 2017-01-01 RX ORDER — LANOLIN ALCOHOL/MO/W.PET/CERES
500 CREAM (GRAM) TOPICAL DAILY
Status: DISCONTINUED | OUTPATIENT
Start: 2017-01-01 | End: 2017-01-01

## 2017-01-01 RX ORDER — IPRATROPIUM BROMIDE AND ALBUTEROL SULFATE 2.5; .5 MG/3ML; MG/3ML
3 SOLUTION RESPIRATORY (INHALATION)
Status: DISCONTINUED | OUTPATIENT
Start: 2017-01-01 | End: 2017-01-01 | Stop reason: HOSPADM

## 2017-01-01 RX ORDER — ACETAMINOPHEN 325 MG/1
650 TABLET ORAL EVERY 6 HOURS PRN
Status: DISCONTINUED | OUTPATIENT
Start: 2017-01-01 | End: 2017-01-01 | Stop reason: HOSPADM

## 2017-01-01 RX ORDER — MAGNESIUM HYDROXIDE/ALUMINUM HYDROXICE/SIMETHICONE 120; 1200; 1200 MG/30ML; MG/30ML; MG/30ML
30 SUSPENSION ORAL EVERY 6 HOURS PRN
Status: DISCONTINUED | OUTPATIENT
Start: 2017-01-01 | End: 2017-01-01 | Stop reason: HOSPADM

## 2017-01-01 RX ORDER — LACTULOSE 10 G/15ML
20 SOLUTION ORAL DAILY PRN
Status: CANCELLED | OUTPATIENT
Start: 2017-01-01

## 2017-01-01 RX ORDER — CHOLECALCIFEROL (VITAMIN D3) 125 MCG
500 CAPSULE ORAL DAILY
COMMUNITY

## 2017-01-01 RX ORDER — LIDOCAINE 50 MG/G
1 PATCH TOPICAL EVERY 24 HOURS
Status: DISCONTINUED | OUTPATIENT
Start: 2017-01-01 | End: 2017-01-01 | Stop reason: HOSPADM

## 2017-01-01 RX ORDER — ACETAMINOPHEN 325 MG/1
650 TABLET ORAL EVERY 4 HOURS PRN
Status: CANCELLED | OUTPATIENT
Start: 2017-01-01

## 2017-01-01 RX ORDER — TRAMADOL HYDROCHLORIDE 50 MG/1
75 TABLET ORAL EVERY 4 HOURS PRN
Status: DISCONTINUED | OUTPATIENT
Start: 2017-01-01 | End: 2017-01-01 | Stop reason: HOSPADM

## 2017-01-01 RX ORDER — POLYETHYLENE GLYCOL 3350 17 G/17G
17 POWDER, FOR SOLUTION ORAL 2 TIMES DAILY
Status: CANCELLED | OUTPATIENT
Start: 2017-01-01

## 2017-01-01 RX ORDER — LANOLIN ALCOHOL/MO/W.PET/CERES
500 CREAM (GRAM) TOPICAL DAILY
Status: CANCELLED | OUTPATIENT
Start: 2017-01-01

## 2017-01-01 RX ORDER — LORAZEPAM 2 MG/ML
0.5 INJECTION INTRAMUSCULAR EVERY 4 HOURS
Status: DISCONTINUED | OUTPATIENT
Start: 2017-01-01 | End: 2017-01-01 | Stop reason: HOSPADM

## 2017-01-01 RX ORDER — CHOLECALCIFEROL (VITAMIN D3) 125 MCG
500 CAPSULE ORAL DAILY
Status: DISCONTINUED | OUTPATIENT
Start: 2017-01-01 | End: 2017-01-01 | Stop reason: HOSPADM

## 2017-01-01 RX ORDER — CEFTRIAXONE SODIUM 1 G/50ML
1 INJECTION, SOLUTION INTRAVENOUS EVERY 24 HOURS
Status: DISCONTINUED | OUTPATIENT
Start: 2017-01-01 | End: 2017-01-01

## 2017-01-01 RX ORDER — MORPHINE SULFATE 2 MG/ML
2 INJECTION, SOLUTION INTRAMUSCULAR; INTRAVENOUS EVERY 6 HOURS
Status: DISCONTINUED | OUTPATIENT
Start: 2017-01-01 | End: 2017-01-01

## 2017-01-01 RX ORDER — HEPARIN SODIUM 5000 [USP'U]/ML
5000 INJECTION, SOLUTION INTRAVENOUS; SUBCUTANEOUS EVERY 8 HOURS SCHEDULED
Status: DISCONTINUED | OUTPATIENT
Start: 2017-01-01 | End: 2017-01-01 | Stop reason: HOSPADM

## 2017-01-01 RX ORDER — MORPHINE SULFATE 2 MG/ML
2 INJECTION, SOLUTION INTRAMUSCULAR; INTRAVENOUS EVERY 4 HOURS PRN
Status: DISCONTINUED | OUTPATIENT
Start: 2017-01-01 | End: 2017-01-01 | Stop reason: HOSPADM

## 2017-01-01 RX ORDER — TRAMADOL HYDROCHLORIDE 50 MG/1
25 TABLET ORAL EVERY 6 HOURS PRN
Status: DISCONTINUED | OUTPATIENT
Start: 2017-01-01 | End: 2017-01-01 | Stop reason: HOSPADM

## 2017-01-01 RX ORDER — LISINOPRIL 5 MG/1
5 TABLET ORAL
Status: DISCONTINUED | OUTPATIENT
Start: 2017-01-01 | End: 2017-01-01

## 2017-01-01 RX ORDER — LISINOPRIL 10 MG/1
10 TABLET ORAL
Status: CANCELLED | OUTPATIENT
Start: 2017-01-01

## 2017-01-01 RX ORDER — HEPARIN SODIUM 5000 [USP'U]/ML
5000 INJECTION, SOLUTION INTRAVENOUS; SUBCUTANEOUS EVERY 8 HOURS SCHEDULED
Status: DISCONTINUED | OUTPATIENT
Start: 2017-01-01 | End: 2017-01-01

## 2017-01-01 RX ORDER — LIDOCAINE HYDROCHLORIDE 10 MG/ML
10 INJECTION, SOLUTION INFILTRATION; PERINEURAL ONCE
Status: COMPLETED | OUTPATIENT
Start: 2017-01-01 | End: 2017-01-01

## 2017-01-01 RX ORDER — LORAZEPAM 2 MG/ML
0.5 INJECTION INTRAMUSCULAR EVERY 4 HOURS PRN
Status: DISCONTINUED | OUTPATIENT
Start: 2017-01-01 | End: 2017-01-01 | Stop reason: HOSPADM

## 2017-01-01 RX ORDER — LORAZEPAM 2 MG/ML
0.25 INJECTION INTRAMUSCULAR EVERY 4 HOURS PRN
Status: DISCONTINUED | OUTPATIENT
Start: 2017-01-01 | End: 2017-01-01

## 2017-01-01 RX ORDER — FAMOTIDINE 20 MG/1
20 TABLET, FILM COATED ORAL DAILY
Status: DISCONTINUED | OUTPATIENT
Start: 2017-01-01 | End: 2017-01-01 | Stop reason: HOSPADM

## 2017-01-01 RX ORDER — CEFTRIAXONE SODIUM 1 G/50ML
1 INJECTION, SOLUTION INTRAVENOUS ONCE
Status: COMPLETED | OUTPATIENT
Start: 2017-01-01 | End: 2017-01-01

## 2017-01-01 RX ORDER — NITROGLYCERIN 0.4 MG/1
TABLET SUBLINGUAL
COMMUNITY
Start: 2017-01-01 | End: 2017-01-01 | Stop reason: HOSPADM

## 2017-01-01 RX ORDER — ACETAMINOPHEN 325 MG/1
650 TABLET ORAL ONCE
Status: DISCONTINUED | OUTPATIENT
Start: 2017-01-01 | End: 2017-01-01 | Stop reason: HOSPADM

## 2017-01-01 RX ORDER — ESCITALOPRAM OXALATE 20 MG/1
10 TABLET ORAL DAILY
Status: DISCONTINUED | OUTPATIENT
Start: 2017-01-01 | End: 2017-01-01

## 2017-01-01 RX ORDER — GLYCOPYRROLATE 0.2 MG/ML
0.4 INJECTION INTRAMUSCULAR; INTRAVENOUS EVERY 4 HOURS
Status: DISCONTINUED | OUTPATIENT
Start: 2017-01-01 | End: 2017-01-01 | Stop reason: HOSPADM

## 2017-01-01 RX ORDER — LANOLIN ALCOHOL/MO/W.PET/CERES
500 CREAM (GRAM) TOPICAL DAILY
Status: DISCONTINUED | OUTPATIENT
Start: 2017-01-01 | End: 2017-01-01 | Stop reason: HOSPADM

## 2017-01-01 RX ORDER — RANITIDINE 150 MG/1
150 TABLET ORAL 2 TIMES DAILY
Qty: 180 TABLET | Refills: 3 | Status: SHIPPED | OUTPATIENT
Start: 2017-01-01

## 2017-01-01 RX ORDER — ACETAMINOPHEN 325 MG/1
650 TABLET ORAL EVERY 4 HOURS PRN
Status: DISCONTINUED | OUTPATIENT
Start: 2017-01-01 | End: 2017-01-01 | Stop reason: HOSPADM

## 2017-01-01 RX ORDER — LACTULOSE 10 G/15ML
20 SOLUTION ORAL DAILY PRN
Status: DISCONTINUED | OUTPATIENT
Start: 2017-01-01 | End: 2017-01-01 | Stop reason: HOSPADM

## 2017-01-01 RX ORDER — SODIUM CHLORIDE 0.9 % (FLUSH) 0.9 %
10 SYRINGE (ML) INJECTION AS NEEDED
Status: CANCELLED | OUTPATIENT
Start: 2017-01-01

## 2017-01-01 RX ORDER — DOCUSATE SODIUM 100 MG/1
100 CAPSULE, LIQUID FILLED ORAL 2 TIMES DAILY PRN
Status: CANCELLED | OUTPATIENT
Start: 2017-01-01

## 2017-01-01 RX ORDER — RIVASTIGMINE 4.6 MG/24H
1 PATCH, EXTENDED RELEASE TRANSDERMAL DAILY
Status: CANCELLED | OUTPATIENT
Start: 2017-01-01

## 2017-01-01 RX ORDER — MORPHINE SULFATE 100 MG/5ML
5 SOLUTION ORAL EVERY 4 HOURS PRN
Status: DISCONTINUED | OUTPATIENT
Start: 2017-01-01 | End: 2017-01-01

## 2017-01-01 RX ORDER — LORAZEPAM 0.5 MG/1
0.5 TABLET ORAL EVERY 8 HOURS PRN
COMMUNITY

## 2017-01-01 RX ORDER — POLYETHYLENE GLYCOL 3350 17 G/17G
17 POWDER, FOR SOLUTION ORAL DAILY
Status: DISCONTINUED | OUTPATIENT
Start: 2017-01-01 | End: 2017-01-01 | Stop reason: HOSPADM

## 2017-01-01 RX ORDER — LIDOCAINE 50 MG/G
1 PATCH TOPICAL EVERY 24 HOURS
Qty: 15 PATCH | Refills: 1 | Status: SHIPPED | OUTPATIENT
Start: 2017-01-01

## 2017-01-01 RX ORDER — MORPHINE SULFATE 2 MG/ML
1 INJECTION, SOLUTION INTRAMUSCULAR; INTRAVENOUS
Status: DISCONTINUED | OUTPATIENT
Start: 2017-01-01 | End: 2017-01-01 | Stop reason: HOSPADM

## 2017-01-01 RX ORDER — ACETAMINOPHEN 325 MG/1
325 TABLET ORAL 4 TIMES DAILY
Status: DISCONTINUED | OUTPATIENT
Start: 2017-01-01 | End: 2017-01-01

## 2017-01-01 RX ORDER — LIDOCAINE 50 MG/G
1 PATCH TOPICAL
Status: DISCONTINUED | OUTPATIENT
Start: 2017-01-01 | End: 2017-01-01 | Stop reason: HOSPADM

## 2017-01-01 RX ORDER — LORAZEPAM 0.5 MG/1
0.25 TABLET ORAL 2 TIMES DAILY
Status: DISCONTINUED | OUTPATIENT
Start: 2017-01-01 | End: 2017-01-01

## 2017-01-01 RX ORDER — DOCUSATE SODIUM 100 MG/1
100 CAPSULE, LIQUID FILLED ORAL 2 TIMES DAILY PRN
Status: DISCONTINUED | OUTPATIENT
Start: 2017-01-01 | End: 2017-01-01 | Stop reason: HOSPADM

## 2017-01-01 RX ORDER — HEPARIN SODIUM 5000 [USP'U]/ML
5000 INJECTION, SOLUTION INTRAVENOUS; SUBCUTANEOUS EVERY 8 HOURS SCHEDULED
Status: CANCELLED | OUTPATIENT
Start: 2017-01-01

## 2017-01-01 RX ORDER — CEFUROXIME AXETIL 500 MG/1
500 TABLET ORAL 2 TIMES DAILY
COMMUNITY

## 2017-01-01 RX ORDER — POLYETHYLENE GLYCOL 3350 17 G/17G
17 POWDER, FOR SOLUTION ORAL 2 TIMES DAILY
Status: DISCONTINUED | OUTPATIENT
Start: 2017-01-01 | End: 2017-01-01 | Stop reason: HOSPADM

## 2017-01-01 RX ORDER — ONDANSETRON 4 MG/1
4 TABLET, ORALLY DISINTEGRATING ORAL EVERY 6 HOURS PRN
Status: DISCONTINUED | OUTPATIENT
Start: 2017-01-01 | End: 2017-01-01 | Stop reason: HOSPADM

## 2017-01-01 RX ORDER — MORPHINE SULFATE 2 MG/ML
1 INJECTION, SOLUTION INTRAMUSCULAR; INTRAVENOUS
Status: DISCONTINUED | OUTPATIENT
Start: 2017-01-01 | End: 2017-01-01

## 2017-01-01 RX ORDER — POLYETHYLENE GLYCOL 3350 17 G/17G
17 POWDER, FOR SOLUTION ORAL 2 TIMES DAILY
Status: DISCONTINUED | OUTPATIENT
Start: 2017-01-01 | End: 2017-01-01

## 2017-01-01 RX ORDER — MORPHINE SULFATE 100 MG/5ML
2 SOLUTION ORAL 2 TIMES DAILY
Status: DISCONTINUED | OUTPATIENT
Start: 2017-01-01 | End: 2017-01-01

## 2017-01-01 RX ORDER — HYDRALAZINE HYDROCHLORIDE 20 MG/ML
10 INJECTION INTRAMUSCULAR; INTRAVENOUS ONCE
Status: COMPLETED | OUTPATIENT
Start: 2017-01-01 | End: 2017-01-01

## 2017-01-01 RX ORDER — SODIUM CHLORIDE 9 MG/ML
100 INJECTION, SOLUTION INTRAVENOUS CONTINUOUS
Status: DISCONTINUED | OUTPATIENT
Start: 2017-01-01 | End: 2017-01-01

## 2017-01-01 RX ORDER — LORAZEPAM 2 MG/ML
1 INJECTION INTRAMUSCULAR
Status: DISCONTINUED | OUTPATIENT
Start: 2017-01-01 | End: 2017-01-01 | Stop reason: HOSPADM

## 2017-01-01 RX ORDER — RIVASTIGMINE 4.6 MG/24H
PATCH, EXTENDED RELEASE TRANSDERMAL
Refills: 6 | COMMUNITY
Start: 2017-01-01

## 2017-01-01 RX ORDER — MORPHINE SULFATE 2 MG/ML
1 INJECTION, SOLUTION INTRAMUSCULAR; INTRAVENOUS EVERY 8 HOURS SCHEDULED
Status: DISCONTINUED | OUTPATIENT
Start: 2017-01-01 | End: 2017-01-01

## 2017-01-01 RX ORDER — MORPHINE SULFATE 2 MG/ML
INJECTION, SOLUTION INTRAMUSCULAR; INTRAVENOUS
Status: COMPLETED
Start: 2017-01-01 | End: 2017-01-01

## 2017-01-01 RX ORDER — FAMOTIDINE 20 MG/1
20 TABLET, FILM COATED ORAL 2 TIMES DAILY
Status: CANCELLED | OUTPATIENT
Start: 2017-01-01

## 2017-01-01 RX ORDER — LACTULOSE 10 G/15ML
20 SOLUTION ORAL DAILY PRN
COMMUNITY

## 2017-01-01 RX ORDER — FAMOTIDINE 20 MG/1
20 TABLET, FILM COATED ORAL 2 TIMES DAILY
Status: DISCONTINUED | OUTPATIENT
Start: 2017-01-01 | End: 2017-01-01 | Stop reason: HOSPADM

## 2017-01-01 RX ORDER — RIVASTIGMINE 4.6 MG/24H
1 PATCH, EXTENDED RELEASE TRANSDERMAL DAILY
Status: DISCONTINUED | OUTPATIENT
Start: 2017-01-01 | End: 2017-01-01

## 2017-01-01 RX ORDER — DOCUSATE SODIUM 100 MG/1
100 CAPSULE, LIQUID FILLED ORAL 2 TIMES DAILY PRN
Status: DISCONTINUED | OUTPATIENT
Start: 2017-01-01 | End: 2017-01-01

## 2017-01-01 RX ADMIN — LORAZEPAM 0.5 MG: 2 INJECTION, SOLUTION INTRAMUSCULAR; INTRAVENOUS at 19:02

## 2017-01-01 RX ADMIN — LORAZEPAM 0.5 MG: 2 INJECTION, SOLUTION INTRAMUSCULAR; INTRAVENOUS at 14:04

## 2017-01-01 RX ADMIN — CEFTRIAXONE SODIUM 1 G: 1 INJECTION, SOLUTION INTRAVENOUS at 19:48

## 2017-01-01 RX ADMIN — LORAZEPAM 0.25 MG: 2 INJECTION, SOLUTION INTRAMUSCULAR; INTRAVENOUS at 02:21

## 2017-01-01 RX ADMIN — LORAZEPAM 0.25 MG: 0.5 TABLET ORAL at 17:12

## 2017-01-01 RX ADMIN — TRAMADOL HYDROCHLORIDE 75 MG: 50 TABLET, FILM COATED ORAL at 22:04

## 2017-01-01 RX ADMIN — Medication: at 17:15

## 2017-01-01 RX ADMIN — LORAZEPAM 0.25 MG: 0.5 TABLET ORAL at 17:25

## 2017-01-01 RX ADMIN — LIDOCAINE HYDROCHLORIDE 10 ML: 10 INJECTION, SOLUTION INFILTRATION; PERINEURAL at 14:16

## 2017-01-01 RX ADMIN — RIVASTIGMINE TRANSDERMAL SYSTEM 1 PATCH: 4.6 PATCH, EXTENDED RELEASE TRANSDERMAL at 08:04

## 2017-01-01 RX ADMIN — LORAZEPAM 0.5 MG: 2 INJECTION, SOLUTION INTRAMUSCULAR; INTRAVENOUS at 16:25

## 2017-01-01 RX ADMIN — CYANOCOBALAMIN TAB 1000 MCG 500 MCG: 1000 TAB at 11:48

## 2017-01-01 RX ADMIN — HEPARIN SODIUM 5000 UNITS: 5000 INJECTION, SOLUTION INTRAVENOUS; SUBCUTANEOUS at 05:53

## 2017-01-01 RX ADMIN — FAMOTIDINE 20 MG: 20 TABLET ORAL at 08:03

## 2017-01-01 RX ADMIN — IPRATROPIUM BROMIDE AND ALBUTEROL SULFATE 3 ML: .5; 3 SOLUTION RESPIRATORY (INHALATION) at 15:21

## 2017-01-01 RX ADMIN — POLYETHYLENE GLYCOL 3350 17 G: 17 POWDER, FOR SOLUTION ORAL at 09:17

## 2017-01-01 RX ADMIN — LORAZEPAM 0.25 MG: 0.5 TABLET ORAL at 16:51

## 2017-01-01 RX ADMIN — MORPHINE SULFATE 1 MG: 2 INJECTION, SOLUTION INTRAMUSCULAR; INTRAVENOUS at 14:00

## 2017-01-01 RX ADMIN — RIVASTIGMINE TRANSDERMAL SYSTEM 1 PATCH: 4.6 PATCH, EXTENDED RELEASE TRANSDERMAL at 11:05

## 2017-01-01 RX ADMIN — HEPARIN SODIUM 5000 UNITS: 5000 INJECTION, SOLUTION INTRAVENOUS; SUBCUTANEOUS at 14:08

## 2017-01-01 RX ADMIN — MORPHINE SULFATE 1 MG: 2 INJECTION, SOLUTION INTRAMUSCULAR; INTRAVENOUS at 17:52

## 2017-01-01 RX ADMIN — MORPHINE SULFATE 5 MG: 100 SOLUTION ORAL at 13:05

## 2017-01-01 RX ADMIN — LORAZEPAM 0.25 MG: 2 INJECTION, SOLUTION INTRAMUSCULAR; INTRAVENOUS at 10:18

## 2017-01-01 RX ADMIN — LORAZEPAM 0.25 MG: 2 INJECTION, SOLUTION INTRAMUSCULAR; INTRAVENOUS at 10:20

## 2017-01-01 RX ADMIN — LIDOCAINE 1 PATCH: 50 PATCH CUTANEOUS at 09:18

## 2017-01-01 RX ADMIN — CYANOCOBALAMIN TAB 1000 MCG 500 MCG: 1000 TAB at 09:36

## 2017-01-01 RX ADMIN — Medication: at 22:22

## 2017-01-01 RX ADMIN — MORPHINE SULFATE 2 MG: 100 SOLUTION ORAL at 16:54

## 2017-01-01 RX ADMIN — MORPHINE SULFATE 2 MG: 100 SOLUTION ORAL at 08:42

## 2017-01-01 RX ADMIN — TRAMADOL HYDROCHLORIDE 25 MG: 50 TABLET ORAL at 17:09

## 2017-01-01 RX ADMIN — ASPIRIN 81 MG: 81 TABLET ORAL at 10:09

## 2017-01-01 RX ADMIN — FAMOTIDINE 20 MG: 20 TABLET ORAL at 07:32

## 2017-01-01 RX ADMIN — MORPHINE SULFATE 1 MG: 2 INJECTION, SOLUTION INTRAMUSCULAR; INTRAVENOUS at 20:21

## 2017-01-01 RX ADMIN — FAMOTIDINE 20 MG: 20 TABLET ORAL at 16:50

## 2017-01-01 RX ADMIN — Medication: at 16:51

## 2017-01-01 RX ADMIN — SODIUM CHLORIDE 125 ML/HR: 9 INJECTION, SOLUTION INTRAVENOUS at 03:53

## 2017-01-01 RX ADMIN — MORPHINE SULFATE 1 MG: 2 INJECTION, SOLUTION INTRAMUSCULAR; INTRAVENOUS at 11:27

## 2017-01-01 RX ADMIN — ESCITALOPRAM OXALATE 10 MG: 20 TABLET, FILM COATED ORAL at 07:32

## 2017-01-01 RX ADMIN — FAMOTIDINE 20 MG: 20 TABLET ORAL at 18:36

## 2017-01-01 RX ADMIN — IPRATROPIUM BROMIDE AND ALBUTEROL SULFATE 3 ML: .5; 3 SOLUTION RESPIRATORY (INHALATION) at 22:39

## 2017-01-01 RX ADMIN — MORPHINE SULFATE 1 MG: 2 INJECTION, SOLUTION INTRAMUSCULAR; INTRAVENOUS at 04:33

## 2017-01-01 RX ADMIN — LORAZEPAM 0.25 MG: 0.5 TABLET ORAL at 07:51

## 2017-01-01 RX ADMIN — ESCITALOPRAM OXALATE 10 MG: 20 TABLET ORAL at 08:04

## 2017-01-01 RX ADMIN — CYANOCOBALAMIN TAB 1000 MCG 500 MCG: 1000 TAB at 08:04

## 2017-01-01 RX ADMIN — ESCITALOPRAM OXALATE 10 MG: 20 TABLET, FILM COATED ORAL at 08:43

## 2017-01-01 RX ADMIN — LISINOPRIL 5 MG: 5 TABLET ORAL at 11:47

## 2017-01-01 RX ADMIN — LORAZEPAM 0.25 MG: 0.5 TABLET ORAL at 18:36

## 2017-01-01 RX ADMIN — ESCITALOPRAM OXALATE 10 MG: 20 TABLET ORAL at 11:47

## 2017-01-01 RX ADMIN — LIDOCAINE 1 PATCH: 50 PATCH CUTANEOUS at 19:40

## 2017-01-01 RX ADMIN — ESCITALOPRAM OXALATE 10 MG: 20 TABLET, FILM COATED ORAL at 08:51

## 2017-01-01 RX ADMIN — Medication: at 11:19

## 2017-01-01 RX ADMIN — Medication: at 11:45

## 2017-01-01 RX ADMIN — Medication: at 07:48

## 2017-01-01 RX ADMIN — MORPHINE SULFATE 5 MG: 100 SOLUTION ORAL at 16:50

## 2017-01-01 RX ADMIN — Medication: at 22:09

## 2017-01-01 RX ADMIN — RIVASTIGMINE TRANSDERMAL SYSTEM 1 PATCH: 4.6 PATCH, EXTENDED RELEASE TRANSDERMAL at 09:35

## 2017-01-01 RX ADMIN — ENOXAPARIN SODIUM 30 MG: 30 INJECTION SUBCUTANEOUS at 21:00

## 2017-01-01 RX ADMIN — ESCITALOPRAM OXALATE 10 MG: 20 TABLET ORAL at 09:34

## 2017-01-01 RX ADMIN — LORAZEPAM 0.5 MG: 2 INJECTION, SOLUTION INTRAMUSCULAR; INTRAVENOUS at 10:51

## 2017-01-01 RX ADMIN — FAMOTIDINE 20 MG: 20 TABLET ORAL at 07:48

## 2017-01-01 RX ADMIN — Medication: at 10:58

## 2017-01-01 RX ADMIN — MORPHINE SULFATE 1 MG: 2 INJECTION, SOLUTION INTRAMUSCULAR; INTRAVENOUS at 16:57

## 2017-01-01 RX ADMIN — MORPHINE SULFATE 2 MG: 2 INJECTION, SOLUTION INTRAMUSCULAR; INTRAVENOUS at 14:34

## 2017-01-01 RX ADMIN — HEPARIN SODIUM 5000 UNITS: 5000 INJECTION, SOLUTION INTRAVENOUS; SUBCUTANEOUS at 05:21

## 2017-01-01 RX ADMIN — MORPHINE SULFATE 5 MG: 100 SOLUTION ORAL at 14:08

## 2017-01-01 RX ADMIN — LORAZEPAM 0.5 MG: 2 INJECTION, SOLUTION INTRAMUSCULAR; INTRAVENOUS at 06:29

## 2017-01-01 RX ADMIN — LORAZEPAM 0.5 MG: 2 INJECTION, SOLUTION INTRAMUSCULAR; INTRAVENOUS at 08:35

## 2017-01-01 RX ADMIN — Medication: at 08:40

## 2017-01-01 RX ADMIN — GLYCOPYRROLATE 0.4 MG: 0.2 INJECTION, SOLUTION INTRAMUSCULAR; INTRAVENOUS at 09:52

## 2017-01-01 RX ADMIN — FAMOTIDINE 20 MG: 20 TABLET ORAL at 09:37

## 2017-01-01 RX ADMIN — LORAZEPAM 0.25 MG: 2 INJECTION, SOLUTION INTRAMUSCULAR; INTRAVENOUS at 14:00

## 2017-01-01 RX ADMIN — FAMOTIDINE 20 MG: 20 TABLET ORAL at 17:25

## 2017-01-01 RX ADMIN — IPRATROPIUM BROMIDE AND ALBUTEROL SULFATE 3 ML: .5; 3 SOLUTION RESPIRATORY (INHALATION) at 23:10

## 2017-01-01 RX ADMIN — ONDANSETRON 4 MG: 2 INJECTION INTRAMUSCULAR; INTRAVENOUS at 09:25

## 2017-01-01 RX ADMIN — MORPHINE SULFATE 1 MG: 2 INJECTION, SOLUTION INTRAMUSCULAR; INTRAVENOUS at 06:07

## 2017-01-01 RX ADMIN — Medication: at 08:09

## 2017-01-01 RX ADMIN — TRAMADOL HYDROCHLORIDE 75 MG: 50 TABLET, FILM COATED ORAL at 15:11

## 2017-01-01 RX ADMIN — MORPHINE SULFATE 2 MG: 100 SOLUTION ORAL at 07:33

## 2017-01-01 RX ADMIN — LISINOPRIL 10 MG: 10 TABLET ORAL at 08:42

## 2017-01-01 RX ADMIN — TRAMADOL HYDROCHLORIDE 75 MG: 50 TABLET, FILM COATED ORAL at 16:03

## 2017-01-01 RX ADMIN — FAMOTIDINE 20 MG: 20 TABLET ORAL at 17:00

## 2017-01-01 RX ADMIN — LORAZEPAM 0.5 MG: 2 INJECTION, SOLUTION INTRAMUSCULAR; INTRAVENOUS at 09:39

## 2017-01-01 RX ADMIN — IOPAMIDOL 85 ML: 612 INJECTION, SOLUTION INTRAVENOUS at 14:45

## 2017-01-01 RX ADMIN — Medication: at 21:56

## 2017-01-01 RX ADMIN — Medication: at 18:21

## 2017-01-01 RX ADMIN — MORPHINE SULFATE 1 MG: 2 INJECTION, SOLUTION INTRAMUSCULAR; INTRAVENOUS at 12:11

## 2017-01-01 RX ADMIN — LORAZEPAM 0.5 MG: 2 INJECTION, SOLUTION INTRAMUSCULAR; INTRAVENOUS at 04:45

## 2017-01-01 RX ADMIN — LORAZEPAM 0.25 MG: 2 INJECTION, SOLUTION INTRAMUSCULAR; INTRAVENOUS at 01:33

## 2017-01-01 RX ADMIN — HEPARIN SODIUM 5000 UNITS: 5000 INJECTION, SOLUTION INTRAVENOUS; SUBCUTANEOUS at 21:08

## 2017-01-01 RX ADMIN — FAMOTIDINE 20 MG: 20 TABLET, FILM COATED ORAL at 10:10

## 2017-01-01 RX ADMIN — MORPHINE SULFATE 5 MG: 100 SOLUTION ORAL at 07:48

## 2017-01-01 RX ADMIN — LORAZEPAM 0.25 MG: 2 INJECTION, SOLUTION INTRAMUSCULAR; INTRAVENOUS at 13:07

## 2017-01-01 RX ADMIN — Medication 500 MCG: at 09:17

## 2017-01-01 RX ADMIN — IPRATROPIUM BROMIDE AND ALBUTEROL SULFATE 3 ML: .5; 3 SOLUTION RESPIRATORY (INHALATION) at 11:19

## 2017-01-01 RX ADMIN — TRAMADOL HYDROCHLORIDE 25 MG: 50 TABLET ORAL at 04:42

## 2017-01-01 RX ADMIN — FAMOTIDINE 20 MG: 20 TABLET ORAL at 16:54

## 2017-01-01 RX ADMIN — TRAMADOL HYDROCHLORIDE 25 MG: 50 TABLET ORAL at 08:34

## 2017-01-01 RX ADMIN — MORPHINE SULFATE 1 MG: 2 INJECTION, SOLUTION INTRAMUSCULAR; INTRAVENOUS at 05:29

## 2017-01-01 RX ADMIN — MORPHINE SULFATE 1 MG: 2 INJECTION, SOLUTION INTRAMUSCULAR; INTRAVENOUS at 09:39

## 2017-01-01 RX ADMIN — RIVASTIGMINE TRANSDERMAL SYSTEM 1 PATCH: 4.6 PATCH, EXTENDED RELEASE TRANSDERMAL at 09:45

## 2017-01-01 RX ADMIN — MORPHINE SULFATE 2 MG: 100 SOLUTION ORAL at 08:34

## 2017-01-01 RX ADMIN — MORPHINE SULFATE 1 MG: 2 INJECTION, SOLUTION INTRAMUSCULAR; INTRAVENOUS at 04:56

## 2017-01-01 RX ADMIN — FAMOTIDINE 20 MG: 20 TABLET ORAL at 07:51

## 2017-01-01 RX ADMIN — Medication: at 11:52

## 2017-01-01 RX ADMIN — POLYETHYLENE GLYCOL 3350 17 G: 17 POWDER, FOR SOLUTION ORAL at 17:00

## 2017-01-01 RX ADMIN — MORPHINE SULFATE 1 MG: 2 INJECTION, SOLUTION INTRAMUSCULAR; INTRAVENOUS at 21:56

## 2017-01-01 RX ADMIN — ONDANSETRON 4 MG: 2 INJECTION INTRAMUSCULAR; INTRAVENOUS at 14:20

## 2017-01-01 RX ADMIN — SODIUM CHLORIDE 125 ML/HR: 9 INJECTION, SOLUTION INTRAVENOUS at 11:46

## 2017-01-01 RX ADMIN — LORAZEPAM 0.5 MG: 2 INJECTION, SOLUTION INTRAMUSCULAR; INTRAVENOUS at 09:26

## 2017-01-01 RX ADMIN — MORPHINE SULFATE 1 MG: 2 INJECTION, SOLUTION INTRAMUSCULAR; INTRAVENOUS at 14:09

## 2017-01-01 RX ADMIN — Medication 500 MCG: at 08:33

## 2017-01-01 RX ADMIN — MORPHINE SULFATE 2 MG: 100 SOLUTION ORAL at 08:50

## 2017-01-01 RX ADMIN — HEPARIN SODIUM 5000 UNITS: 5000 INJECTION, SOLUTION INTRAVENOUS; SUBCUTANEOUS at 17:25

## 2017-01-01 RX ADMIN — FAMOTIDINE 20 MG: 20 TABLET, FILM COATED ORAL at 09:44

## 2017-01-01 RX ADMIN — ESCITALOPRAM OXALATE 10 MG: 20 TABLET, FILM COATED ORAL at 08:27

## 2017-01-01 RX ADMIN — RIVASTIGMINE TRANSDERMAL SYSTEM 1 PATCH: 4.6 PATCH, EXTENDED RELEASE TRANSDERMAL at 11:49

## 2017-01-01 RX ADMIN — TRAMADOL HYDROCHLORIDE 25 MG: 50 TABLET ORAL at 13:39

## 2017-01-01 RX ADMIN — MORPHINE SULFATE 2 MG: 2 INJECTION, SOLUTION INTRAMUSCULAR; INTRAVENOUS at 09:25

## 2017-01-01 RX ADMIN — POLYETHYLENE GLYCOL 3350 17 G: 17 POWDER, FOR SOLUTION ORAL at 13:11

## 2017-01-01 RX ADMIN — LORAZEPAM 0.5 MG: 2 INJECTION, SOLUTION INTRAMUSCULAR; INTRAVENOUS at 20:21

## 2017-01-01 RX ADMIN — HEPARIN SODIUM 5000 UNITS: 5000 INJECTION, SOLUTION INTRAVENOUS; SUBCUTANEOUS at 21:52

## 2017-01-01 RX ADMIN — FAMOTIDINE 20 MG: 20 TABLET ORAL at 08:07

## 2017-01-01 RX ADMIN — LORAZEPAM 0.25 MG: 2 INJECTION, SOLUTION INTRAMUSCULAR; INTRAVENOUS at 14:50

## 2017-01-01 RX ADMIN — RIVASTIGMINE TRANSDERMAL SYSTEM 1 PATCH: 4.6 PATCH, EXTENDED RELEASE TRANSDERMAL at 10:04

## 2017-01-01 RX ADMIN — LORAZEPAM 0.25 MG: 0.5 TABLET ORAL at 07:49

## 2017-01-01 RX ADMIN — FAMOTIDINE 20 MG: 20 TABLET ORAL at 17:12

## 2017-01-01 RX ADMIN — FAMOTIDINE 20 MG: 20 TABLET ORAL at 17:13

## 2017-01-01 RX ADMIN — Medication: at 17:25

## 2017-01-01 RX ADMIN — LORAZEPAM 0.25 MG: 2 INJECTION, SOLUTION INTRAMUSCULAR; INTRAVENOUS at 17:19

## 2017-01-01 RX ADMIN — MORPHINE SULFATE 2 MG: 100 SOLUTION ORAL at 17:12

## 2017-01-01 RX ADMIN — FAMOTIDINE 20 MG: 20 TABLET ORAL at 08:42

## 2017-01-01 RX ADMIN — LIDOCAINE 1 PATCH: 50 PATCH CUTANEOUS at 10:10

## 2017-01-01 RX ADMIN — LORAZEPAM 0.25 MG: 2 INJECTION, SOLUTION INTRAMUSCULAR; INTRAVENOUS at 17:52

## 2017-01-01 RX ADMIN — Medication: at 17:52

## 2017-01-01 RX ADMIN — FAMOTIDINE 20 MG: 20 TABLET ORAL at 11:48

## 2017-01-01 RX ADMIN — RIVASTIGMINE TRANSDERMAL SYSTEM 1 PATCH: 4.6 PATCH, EXTENDED RELEASE TRANSDERMAL at 08:26

## 2017-01-01 RX ADMIN — Medication: at 14:01

## 2017-01-01 RX ADMIN — RIVASTIGMINE TRANSDERMAL SYSTEM 1 PATCH: 4.6 PATCH, EXTENDED RELEASE TRANSDERMAL at 19:41

## 2017-01-01 RX ADMIN — Medication: at 10:20

## 2017-01-01 RX ADMIN — Medication: at 20:22

## 2017-01-01 RX ADMIN — ESCITALOPRAM OXALATE 10 MG: 20 TABLET, FILM COATED ORAL at 08:07

## 2017-01-01 RX ADMIN — MORPHINE SULFATE 1 MG: 2 INJECTION, SOLUTION INTRAMUSCULAR; INTRAVENOUS at 22:23

## 2017-01-01 RX ADMIN — CALCIUM CARBONATE-CHOLECALCIFEROL TAB 250 MG-125 UNIT 1 TABLET: 250-125 TAB at 08:33

## 2017-01-01 RX ADMIN — DIATRIZOATE MEGLUMINE AND DIATRIZOATE SODIUM 30 ML: 660; 100 LIQUID ORAL; RECTAL at 14:45

## 2017-01-01 RX ADMIN — LIDOCAINE 1 PATCH: 50 PATCH CUTANEOUS at 10:03

## 2017-01-01 RX ADMIN — MORPHINE SULFATE 1 MG: 2 INJECTION, SOLUTION INTRAMUSCULAR; INTRAVENOUS at 10:54

## 2017-01-01 RX ADMIN — LORAZEPAM 0.5 MG: 2 INJECTION, SOLUTION INTRAMUSCULAR; INTRAVENOUS at 21:49

## 2017-01-01 RX ADMIN — MORPHINE SULFATE 2 MG: 100 SOLUTION ORAL at 16:51

## 2017-01-01 RX ADMIN — MORPHINE SULFATE 1 MG: 2 INJECTION, SOLUTION INTRAMUSCULAR; INTRAVENOUS at 15:14

## 2017-01-01 RX ADMIN — POLYETHYLENE GLYCOL 3350 17 G: 17 POWDER, FOR SOLUTION ORAL at 08:03

## 2017-01-01 RX ADMIN — SODIUM CHLORIDE 1000 ML: 9 INJECTION, SOLUTION INTRAVENOUS at 13:00

## 2017-01-01 RX ADMIN — FAMOTIDINE 20 MG: 20 TABLET ORAL at 17:30

## 2017-01-01 RX ADMIN — SODIUM CHLORIDE 125 ML/HR: 9 INJECTION, SOLUTION INTRAVENOUS at 19:59

## 2017-01-01 RX ADMIN — CALCIUM CARBONATE-CHOLECALCIFEROL TAB 250 MG-125 UNIT 1 TABLET: 250-125 TAB at 09:17

## 2017-01-01 RX ADMIN — ASPIRIN 81 MG: 81 TABLET ORAL at 09:44

## 2017-01-01 RX ADMIN — LISINOPRIL 10 MG: 10 TABLET ORAL at 08:07

## 2017-01-01 RX ADMIN — LORAZEPAM 0.5 MG: 2 INJECTION, SOLUTION INTRAMUSCULAR; INTRAVENOUS at 18:14

## 2017-01-01 RX ADMIN — HEPARIN SODIUM 5000 UNITS: 5000 INJECTION, SOLUTION INTRAVENOUS; SUBCUTANEOUS at 14:04

## 2017-01-01 RX ADMIN — LORAZEPAM 0.25 MG: 0.5 TABLET ORAL at 07:32

## 2017-01-01 RX ADMIN — ESCITALOPRAM OXALATE 10 MG: 20 TABLET, FILM COATED ORAL at 07:51

## 2017-01-01 RX ADMIN — LORAZEPAM 0.25 MG: 2 INJECTION, SOLUTION INTRAMUSCULAR; INTRAVENOUS at 18:22

## 2017-01-01 RX ADMIN — LIDOCAINE 1 PATCH: 50 PATCH CUTANEOUS at 08:32

## 2017-01-01 RX ADMIN — RIVASTIGMINE TRANSDERMAL SYSTEM 1 PATCH: 4.6 PATCH, EXTENDED RELEASE TRANSDERMAL at 17:25

## 2017-01-01 RX ADMIN — LORAZEPAM 0.25 MG: 0.5 TABLET ORAL at 08:51

## 2017-01-01 RX ADMIN — HEPARIN SODIUM 5000 UNITS: 5000 INJECTION, SOLUTION INTRAVENOUS; SUBCUTANEOUS at 06:01

## 2017-01-01 RX ADMIN — MORPHINE SULFATE 5 MG: 100 SOLUTION ORAL at 09:09

## 2017-01-01 RX ADMIN — MORPHINE SULFATE 2 MG: 100 SOLUTION ORAL at 17:24

## 2017-01-01 RX ADMIN — LORAZEPAM 0.25 MG: 2 INJECTION, SOLUTION INTRAMUSCULAR; INTRAVENOUS at 02:11

## 2017-01-01 RX ADMIN — LISINOPRIL 5 MG: 5 TABLET ORAL at 09:36

## 2017-01-01 RX ADMIN — MORPHINE SULFATE 2 MG: 100 SOLUTION ORAL at 18:36

## 2017-01-01 RX ADMIN — TRAMADOL HYDROCHLORIDE 25 MG: 50 TABLET ORAL at 17:14

## 2017-01-01 RX ADMIN — LORAZEPAM 0.5 MG: 2 INJECTION, SOLUTION INTRAMUSCULAR; INTRAVENOUS at 02:03

## 2017-01-01 RX ADMIN — MORPHINE SULFATE 1 MG: 2 INJECTION, SOLUTION INTRAMUSCULAR; INTRAVENOUS at 14:20

## 2017-01-01 RX ADMIN — SODIUM CHLORIDE 500 ML: 9 INJECTION, SOLUTION INTRAVENOUS at 16:50

## 2017-01-01 RX ADMIN — LISINOPRIL 10 MG: 10 TABLET ORAL at 08:27

## 2017-01-01 RX ADMIN — HEPARIN SODIUM 5000 UNITS: 5000 INJECTION, SOLUTION INTRAVENOUS; SUBCUTANEOUS at 13:07

## 2017-01-01 RX ADMIN — ESCITALOPRAM OXALATE 10 MG: 20 TABLET, FILM COATED ORAL at 07:48

## 2017-01-01 RX ADMIN — HEPARIN SODIUM 5000 UNITS: 5000 INJECTION, SOLUTION INTRAVENOUS; SUBCUTANEOUS at 05:30

## 2017-01-01 RX ADMIN — POLYETHYLENE GLYCOL 3350 17 G: 17 POWDER, FOR SOLUTION ORAL at 09:34

## 2017-01-01 RX ADMIN — LORAZEPAM 0.5 MG: 2 INJECTION, SOLUTION INTRAMUSCULAR; INTRAVENOUS at 10:36

## 2017-01-01 RX ADMIN — FAMOTIDINE 20 MG: 20 TABLET ORAL at 08:51

## 2017-01-01 RX ADMIN — FAMOTIDINE 20 MG: 20 TABLET ORAL at 08:27

## 2017-01-01 RX ADMIN — Medication: at 07:34

## 2017-01-01 RX ADMIN — MORPHINE SULFATE 2 MG: 2 INJECTION, SOLUTION INTRAMUSCULAR; INTRAVENOUS at 10:18

## 2017-01-01 RX ADMIN — LISINOPRIL 10 MG: 10 TABLET ORAL at 08:03

## 2017-01-01 RX ADMIN — Medication: at 20:46

## 2017-01-01 RX ADMIN — LISINOPRIL 10 MG: 10 TABLET ORAL at 08:51

## 2017-01-01 RX ADMIN — MORPHINE SULFATE 2 MG: 100 SOLUTION ORAL at 07:50

## 2017-01-01 RX ADMIN — LORAZEPAM 0.25 MG: 2 INJECTION, SOLUTION INTRAMUSCULAR; INTRAVENOUS at 10:53

## 2017-01-01 RX ADMIN — HYDRALAZINE HYDROCHLORIDE 10 MG: 20 INJECTION INTRAMUSCULAR; INTRAVENOUS at 04:49

## 2017-01-01 RX ADMIN — HEPARIN SODIUM 5000 UNITS: 5000 INJECTION, SOLUTION INTRAVENOUS; SUBCUTANEOUS at 21:22

## 2017-01-01 RX ADMIN — MORPHINE SULFATE 1 MG: 2 INJECTION, SOLUTION INTRAMUSCULAR; INTRAVENOUS at 04:44

## 2017-01-01 RX ADMIN — LORAZEPAM 0.5 MG: 2 INJECTION, SOLUTION INTRAMUSCULAR; INTRAVENOUS at 18:17

## 2017-01-01 RX ADMIN — LIDOCAINE 1 PATCH: 50 PATCH CUTANEOUS at 09:42

## 2017-01-01 RX ADMIN — MORPHINE SULFATE 1 MG: 2 INJECTION, SOLUTION INTRAMUSCULAR; INTRAVENOUS at 08:43

## 2017-01-11 NOTE — ED PROVIDER NOTES
" EMERGENCY DEPARTMENT ENCOUNTER    CHIEF COMPLAINT  Chief Complaint: chest pain  History given by: pt  History limited by: nothing  Room Number: 23/23  PMD: MD Dr. Goldie Amin      HPI:  Pt is a 87 y.o. female who presents complaining of mid-sternal chest pain onset last night. Pt went to [facility] today for chest pain & was sent to the ED. Pt reports a mild cough & occasional dizziness. Pt denies vomiting, diarrhea, fever, chills, & SOA.    Duration:  1 day  Onset: gradual  Timing: constant  Location: midsternal  Radiation: none reported  Quality: \"pain\"  Intensity/Severity: moderate  Associated Symptoms: mild cough & occasional dizziness  Aggravating Factors: none reported  Alleviating Factors: none reported  Previous Episodes: none reported  Treatment before arrival: none reported    PAST MEDICAL HISTORY  Active Ambulatory Problems     Diagnosis Date Noted   • Left lower quadrant pain 01/20/2016   • Right lower quadrant abdominal pain 01/20/2016   • Anxiety disorder 01/20/2016   • Aortic valve insufficiency 01/20/2016   • Arthritis of hand 01/20/2016   • Renal atrophy 01/20/2016   • Altered bowel function 01/20/2016   • Chronic kidney disease, stage III (moderate) 01/20/2016   • Dizziness 01/20/2016   • Fatigue 01/20/2016   • Mild cognitive impairment 01/20/2016   • Osteoporosis 01/20/2016   • Impaired glucose tolerance 01/20/2016   • Arthralgia of hip 01/20/2016   • Shoulder pain 01/20/2016   • Sinus bradycardia 01/20/2016   • Spasm 01/20/2016   • Cobalamin deficiency 01/20/2016   • Chronic interstitial lung disease 03/07/2016   • Abnormality of lung on CXR 03/07/2016   • Pulmonary interstitial fibrosis 06/29/2016   • Diverticulosis of large intestine 06/29/2016   • Neck pain 07/14/2016   • Pharyngeal dysphagia 08/04/2016   • New onset of headaches 09/15/2016   • Gait instability 11/22/2016     Resolved Ambulatory Problems     Diagnosis Date Noted   • Cough 03/03/2016     Past Medical History "   Diagnosis Date   • Abdominal pain, LLQ (left lower quadrant)    • Abdominal pain, RLQ    • Abnormal kidney function study 05/04/2015   • Abnormal TSH 05/04/2015   • Anxiety    • Aortic regurgitation    • Arthritis of hand, right    • Atrophy of right kidney    • Change in bowel movement    • CKD (chronic kidney disease) stage 3, GFR 30-59 ml/min    • GERD (gastroesophageal reflux disease)    • H/O colonoscopy    • Hip pain, right    • Long Q-T syndrome    • Memory changes    • Personal history of coronary atherosclerosis    • Personal history of urinary tract infection 06/16/2014   • Pneumonia    • Prediabetes    • Shoulder pain, left    • Vertigo 11/03/2014   • Vitamin B12 deficiency        PAST SURGICAL HISTORY  Past Surgical History   Procedure Laterality Date   • Breast surgery       Biopsy, Open   • Eye surgery       Cataract   • Cholecystectomy     • Cardiac catheterization         FAMILY HISTORY  Family History   Problem Relation Age of Onset   • Dementia Mother    • Heart attack Father      Acute Myocardial Infarction   • Coronary artery disease Father    • Hypertension Father        SOCIAL HISTORY  Social History     Social History   • Marital status: Single     Spouse name: N/A   • Number of children: N/A   • Years of education: N/A     Occupational History   • Not on file.     Social History Main Topics   • Smoking status: Never Smoker   • Smokeless tobacco: Not on file   • Alcohol use Yes      Comment: social    • Drug use: No   • Sexual activity: Defer     Other Topics Concern   • Not on file     Social History Narrative       ALLERGIES  Alendronate; Amlodipine; Aspirin; Axid [nizatidine]; Azithromycin; Codeine; Estrogens; Fentanyl; Fluticasone; Hydrocodone-acetaminophen; Levofloxacin; Naproxen; Niacin; Nsaids; Omeprazole; Other; Oxycodone; Penicillins; Prednisone; Proton pump inhibitors; Rabeprazole; Raloxifene; Risedronate sodium; Sulfa antibiotics; and Tetracyclines & related    REVIEW OF  SYSTEMS  Review of Systems   Constitutional: Negative for chills and fever.   HENT: Negative for sore throat and trouble swallowing.    Eyes: Positive for pain. Negative for visual disturbance.   Respiratory: Positive for cough. Negative for shortness of breath.    Cardiovascular: Positive for chest pain. Negative for palpitations and leg swelling.   Gastrointestinal: Negative for abdominal pain, diarrhea and vomiting.   Endocrine: Negative.    Genitourinary: Negative for decreased urine volume, dysuria and frequency.   Musculoskeletal: Negative for neck pain.   Skin: Negative for rash.   Allergic/Immunologic: Negative.    Neurological: Positive for dizziness. Negative for syncope, weakness, numbness and headaches.   Hematological: Negative.    Psychiatric/Behavioral: Negative.    All other systems reviewed and are negative.      PHYSICAL EXAM  ED Triage Vitals   Temp Heart Rate Resp BP SpO2   01/11/17 1716 01/11/17 1716 01/11/17 1716 01/11/17 1716 01/11/17 1716   98.2 °F (36.8 °C) 68 18 142/86 96 %      Temp src Heart Rate Source Patient Position BP Location FiO2 (%)   -- -- -- -- --              Physical Exam   Constitutional: She is oriented to person, place, and time and well-developed, well-nourished, and in no distress. No distress.   HENT:   Head: Normocephalic.   Eyes: EOM are normal. Pupils are equal, round, and reactive to light.   Neck: Normal range of motion.   Cardiovascular: Normal rate and regular rhythm.    No murmur heard.  Pulmonary/Chest: Effort normal and breath sounds normal. No respiratory distress. She exhibits tenderness (over anterior sternum). She exhibits no crepitus.   Abdominal: Soft. There is no tenderness. There is no rebound and no guarding.   Musculoskeletal: Normal range of motion. She exhibits no edema.   Neurological: She is alert and oriented to person, place, and time.   Skin: Skin is warm and dry. No rash noted.   Psychiatric: Mood and affect normal.   Nursing note and vitals  reviewed.      LAB RESULTS  Lab Results (last 24 hours)     Procedure Component Value Units Date/Time    CBC & Differential [[id number]] Collected:  01/11/17 1744    Specimen:  Blood Updated:  01/11/17 1812    Narrative:       The following orders were created for panel order CBC & Differential.  Procedure                               Abnormality         Status                     ---------                               -----------         ------                     CBC Auto Differential[[id number]]         Abnormal            Final result                 Please view results for these tests on the individual orders.    Comprehensive Metabolic Panel [[id number]]  (Abnormal) Collected:  01/11/17 1744    Specimen:  Blood Updated:  01/11/17 1825     Glucose 97 mg/dL      BUN 20 mg/dL      Creatinine 0.99 mg/dL      Sodium 140 mmol/L      Potassium 3.8 mmol/L      Chloride 101 mmol/L      CO2 27.9 mmol/L      Calcium 9.5 mg/dL      Total Protein 7.5 g/dL      Albumin 3.50 g/dL      ALT (SGPT) 16 U/L      AST (SGOT) 21 U/L      Alkaline Phosphatase 91 U/L      Total Bilirubin 0.3 mg/dL      eGFR Non African Amer 53 (L) mL/min/1.73      Globulin 4.0 gm/dL      A/G Ratio 0.9 g/dL      BUN/Creatinine Ratio 20.2      Anion Gap 11.1 mmol/L     Narrative:       The MDRD GFR formula is only valid for adults with stable renal function between ages 18 and 70.    Troponin [[id number]]  (Normal) Collected:  01/11/17 1744    Specimen:  Blood Updated:  01/11/17 1825     Troponin T <0.010 ng/mL     Narrative:       Troponin T Reference Ranges:  Less than 0.03 ng/mL:    Negative for AMI  0.03 to 0.09 ng/mL:      Indeterminant for AMI  Greater than 0.09 ng/mL: Positive for AMI    CBC Auto Differential [[id number]]  (Abnormal) Collected:  01/11/17 1744    Specimen:  Blood Updated:  01/11/17 1812     WBC 8.62 10*3/mm3      RBC 4.00 10*6/mm3      Hemoglobin 12.6 g/dL      Hematocrit 39.4 %      MCV 98.5 (H) fL      MCH 31.5 pg      MCHC 32.0 (L)  g/dL      RDW 12.5 %      RDW-SD 45.1 fl      MPV 10.2 fL      Platelets 196 10*3/mm3      Neutrophil % 66.2 %      Lymphocyte % 19.3 (L) %      Monocyte % 12.5 (H) %      Eosinophil % 1.6 %      Basophil % 0.2 %      Immature Grans % 0.2 %      Neutrophils, Absolute 5.70 10*3/mm3      Lymphocytes, Absolute 1.66 10*3/mm3      Monocytes, Absolute 1.08 10*3/mm3      Eosinophils, Absolute 0.14 10*3/mm3      Basophils, Absolute 0.02 10*3/mm3      Immature Grans, Absolute 0.02 10*3/mm3     Lipase [[id number]]  (Abnormal) Collected:  01/11/17 1744    Specimen:  Blood Updated:  01/11/17 1825     Lipase 68 (H) U/L     Sedimentation Rate [[id number]]  (Abnormal) Collected:  01/11/17 1744    Specimen:  Blood Updated:  01/11/17 1851     Sed Rate 33 (H) mm/hr           I ordered the above labs and reviewed the results    RADIOLOGY  XR Chest 2 View   Preliminary Result   Chronic interstitial changes in the lungs. No acute   cardiopulmonary abnormality is identified.            1859 Reviewed CXR, which showed chronic interstitial changes, but nothing acute. Independently viewed by me. Interpreted by radiologist.     I ordered the above noted radiological studies. Interpreted by radiologist. Reviewed by me in PACS.       PROCEDURES  Procedures    EKG           EKG time: 1732  Rhythm/Rate: NSR, 65  P waves and MT: normal  QRS, axis: LAD  ST and T waves: poor R wave progression & LVH    Interpreted Contemporaneously by me, independently viewed  No sig changes compared to prior 9/27/16.      PROGRESS AND CONSULTS  ED Course     1727 Ordered EKG & CXR for further evaluation.    1747 Ordered CBC, CMP, troponin, lipase, & sed rate for further evaluation.    1900 Pt rechecked & is resting comfortably. Discussed unremarkable labs & imaging & plan to discharge. Pt agrees w/ plan & has no further questions at this time.  PT. Does not tolerate pain medications, steroids or NSAIDS. Will use Tylenol for pain.     MEDICAL DECISION MAKING  Results  were reviewed/discussed with the patient and they were also made aware of online access. Pt also made aware that some labs, such as cultures, will not be resulted during ER visit and follow up with PMD is necessary.     MDM  Number of Diagnoses or Management Options     Amount and/or Complexity of Data Reviewed  Clinical lab tests: ordered and reviewed  Tests in the radiology section of CPT®: ordered and reviewed (CXR)  Tests in the medicine section of CPT®: ordered and reviewed (See EKG in procedure note.)  Decide to obtain previous medical records or to obtain history from someone other than the patient: yes  Review and summarize past medical records: yes (Pt was seen in the ER 09/27/16 for weakness & followed up w/ cardio in office.   Pt had a stress test  that was negative for ischemia & an echo that showed mild mitral regurg on 05/19/2016.)    Patient Progress  Patient progress: stable         DIAGNOSIS  Final diagnoses:   Chest wall pain       DISPOSITION  DISCHARGE    Patient discharged in stable condition.    Reviewed implications of results, diagnosis, meds, responsibility to follow up, warning signs and symptoms of possible worsening, potential complications and reasons to return to ER.    Patient/Family voiced understanding of above instructions.    Discussed plan for discharge, as there is no emergent indication for admission.  Pt/family is agreeable and understands need for follow up and repeat testing.  Pt is aware that discharge does not mean that nothing is wrong but it indicates no emergency is present that requires admission and they must continue care with follow-up as given below or physician of their choice.     FOLLOW-UP  Goldie Madera MD  [address]  [facility] [address]  [place] [id number] [phone]    Schedule an appointment as soon as possible for a visit  If symptoms worsen    Toñito Matias MD  [address]  [facility] [address]  [place] [address]  [phone]    Call  if pain returns or  worsens         Medication List      New Prescriptions          Acetaminophen 325 MG capsule   Commonly known as:  TYLENOL   Take 325 mg by mouth 4 (Four) Times a Day. May take 2 if needed         Changed          ECOTRIN LOW STRENGTH 81 MG EC tablet   Generic drug:  aspirin   What changed:  Another medication with the same name was removed. Continue   taking this medication, and follow the directions you see here.             Latest Documented Vital Signs:  As of 8:28 PM  BP- 156/80 HR- 59 Temp- 98.2 °F (36.8 °C) O2 sat- 98%    --  Documentation assistance provided by seda Mari for Dr. Skinner.  Information recorded by the magalise was done at my direction and has been verified and validated by me.     Sophia Mari  01/11/17 1751       Sophia Mari  01/11/17 1933       Shree Skinner MD  01/11/17 2030

## 2017-01-15 NOTE — TELEPHONE ENCOUNTER
"ER F/U phone call:  Pt states that she still has \" spells of dizziness\". Pt will attempt to see PCP early this week. Denies the need to return to er, stating \"I have been out driving and have had dinner with my son\". Advised pt to return to ER if symptoms become worse. No other questions or concerns voiced at this time. Erin Laboy RN    "

## 2017-01-16 NOTE — PROGRESS NOTES
Chief Complaint  Katharine Mitchell is a 87 y.o. female who presents for Dizziness (Pt stated that all of her symptoms started about 5 days ago. Off and on. ) and Chest Pain (Was seen in the ER for this on 1/11/7, was given nitro, but claims she is not taking this. )  .    History of Present Illness   Katharine is here for acute care.  She was seen at the ER five days ago. She had a CXR and EKG then.  She has had intermittent dizziness for 4-5 days.  She feels weak in her legs when she gets dizzy.   She says it doesn't feel like vertigo.  She is having a few headaches.  Sometimes she has scalp tenderness.  She has some chest wall soreness.  She points to her sternum.  People keep telling her her BP is too high.  But when we check it, her bp is ok.  She has pains in her stomach.  She is tired all the time.  She is sleeping more than she used to.       Review of Systems   Constitutional: Positive for fatigue. Negative for chills and fever.   Respiratory: Negative for chest tightness and shortness of breath.    Cardiovascular: Negative for chest pain, palpitations and leg swelling.   Gastrointestinal: Positive for abdominal pain. Negative for blood in stool.   Neurological: Positive for dizziness. Negative for speech difficulty.       Patient Active Problem List   Diagnosis   • Left lower quadrant pain   • Right lower quadrant abdominal pain   • Anxiety disorder   • Aortic valve insufficiency   • Arthritis of hand   • Renal atrophy   • Altered bowel function   • Chronic kidney disease, stage III (moderate)   • Dizziness   • Fatigue   • Mild cognitive impairment   • Osteoporosis   • Impaired glucose tolerance   • Arthralgia of hip   • Shoulder pain   • Sinus bradycardia   • Spasm   • Cobalamin deficiency   • Chronic interstitial lung disease   • Abnormality of lung on CXR   • Pulmonary interstitial fibrosis   • Diverticulosis of large intestine   • Neck pain   • Pharyngeal dysphagia   • New onset of headaches   • Gait  instability       Past Medical History   Diagnosis Date   • Abdominal pain, LLQ (left lower quadrant)    • Abdominal pain, RLQ    • Abnormal kidney function study 05/04/2015     PT myra bumped up in Feb. Had a contrasted CT in Jan which amy contributed. I had spoken with her GYN who had been prescribing Boniva when he contacted me with her labs. I recommended holding off giving her low creatinine clearance. He was supposed to discuss this with her.   • Abnormal TSH 05/04/2015     Recheck today.   • Anxiety      controlled sub agreement signed 6/16/14   • Aortic regurgitation    • Arthritis of hand, right    • Atrophy of right kidney    • Change in bowel movement    • CKD (chronic kidney disease) stage 3, GFR 30-59 ml/min    • Diverticulosis of large intestine      extensive   • Dizziness    • Fatigue    • GERD (gastroesophageal reflux disease)    • H/O colonoscopy      Complete (Fiberoptic)   • Hip pain, right    • Long Q-T syndrome    • Memory changes    • Osteoporosis      This has been managed by Dr. Grimes.  Her last DEXA 11/5/2012.  She had been on IV Boniva for quite a long time.   • Personal history of coronary atherosclerosis    • Personal history of urinary tract infection 06/16/2014     Repeat urine today.   • Pneumonia    • Prediabetes    • Shoulder pain, left    • Sinus bradycardia    • Vertigo 11/03/2014     Refill Meclizine for use as needed.   • Vitamin B12 deficiency        Past Surgical History   Procedure Laterality Date   • Breast surgery       Biopsy, Open   • Eye surgery       Cataract   • Cholecystectomy     • Cardiac catheterization         Family History   Problem Relation Age of Onset   • Dementia Mother    • Heart attack Father      Acute Myocardial Infarction   • Coronary artery disease Father    • Hypertension Father        Social History     Social History   • Marital status: Single     Spouse name: N/A   • Number of children: N/A   • Years of education: N/A     Occupational  "History   • Not on file.     Social History Main Topics   • Smoking status: Never Smoker   • Smokeless tobacco: Not on file   • Alcohol use Yes      Comment: social    • Drug use: No   • Sexual activity: Defer     Other Topics Concern   • Not on file     Social History Narrative       Current Outpatient Prescriptions on File Prior to Visit   Medication Sig Dispense Refill   • Acetaminophen (TYLENOL) 325 MG capsule Take 325 mg by mouth 4 (Four) Times a Day. May take 2 if needed  0   • aspirin (ECOTRIN LOW STRENGTH) 81 MG EC tablet Take 81 mg by mouth daily.     • aspirin 81 MG tablet Take  by mouth daily.     • Calcium Carb-Cholecalciferol (CALCIUM 600 + D PO) Take  by mouth daily.     • Cholecalciferol (VITAMIN D-3) 1000 UNITS capsule Take  by mouth.     • ranitidine (ZANTAC) 150 MG tablet TAKE 1 TABLET TWICE A  tablet 3   • vitamin B-12 (CYANOCOBALAMIN) 1000 MCG tablet Place  under the tongue.       No current facility-administered medications on file prior to visit.        Allergies   Allergen Reactions   • Alendronate    • Amlodipine    • Aspirin    • Axid [Nizatidine]    • Azithromycin    • Codeine    • Donepezil      Upset stomach   • Estrogens    • Fentanyl    • Fluticasone    • Hydrocodone-Acetaminophen    • Levofloxacin    • Naproxen    • Niacin    • Nsaids    • Omeprazole    • Other    • Oxycodone    • Penicillins    • Prednisone    • Proton Pump Inhibitors    • Rabeprazole    • Raloxifene    • Risedronate Sodium    • Sulfa Antibiotics    • Tetracyclines & Related        Vitals:    01/16/17 1116   BP: 134/80   Pulse: 64   Resp: 18   SpO2: 99%   Weight: 108 lb (49 kg)   Height: 64\" (162.6 cm)       Body mass index is 18.54 kg/(m^2).    Objective   Physical Exam   Constitutional: She is oriented to person, place, and time. She appears well-developed and well-nourished. No distress.   HENT:   Head: Normocephalic.   Eyes: Conjunctivae are normal. No scleral icterus.   Neck: Normal range of motion. Neck " supple. Carotid bruit is not present.   Cardiovascular: Normal rate and regular rhythm.    Pulmonary/Chest: Effort normal and breath sounds normal. No respiratory distress. She has no wheezes.   Abdominal: Soft. Bowel sounds are normal. There is tenderness in the right lower quadrant, epigastric area and left lower quadrant. There is no rebound and no guarding.   Lymphadenopathy:     She has no cervical adenopathy.   Neurological: She is alert and oriented to person, place, and time. No cranial nerve deficit.   Psychiatric: She has a normal mood and affect. Her behavior is normal. Judgment and thought content normal.       Results for orders placed or performed during the hospital encounter of 01/11/17   Comprehensive Metabolic Panel   Result Value Ref Range    Glucose 97 65 - 99 mg/dL    BUN 20 8 - 23 mg/dL    Creatinine 0.99 0.57 - 1.00 mg/dL    Sodium 140 136 - 145 mmol/L    Potassium 3.8 3.5 - 5.2 mmol/L    Chloride 101 98 - 107 mmol/L    CO2 27.9 22.0 - 29.0 mmol/L    Calcium 9.5 8.6 - 10.5 mg/dL    Total Protein 7.5 6.0 - 8.5 g/dL    Albumin 3.50 3.50 - 5.20 g/dL    ALT (SGPT) 16 1 - 33 U/L    AST (SGOT) 21 1 - 32 U/L    Alkaline Phosphatase 91 39 - 117 U/L    Total Bilirubin 0.3 0.1 - 1.2 mg/dL    eGFR Non African Amer 53 (L) >60 mL/min/1.73    Globulin 4.0 gm/dL    A/G Ratio 0.9 g/dL    BUN/Creatinine Ratio 20.2 7.0 - 25.0    Anion Gap 11.1 mmol/L   Troponin   Result Value Ref Range    Troponin T <0.010 0.000 - 0.030 ng/mL   CBC Auto Differential   Result Value Ref Range    WBC 8.62 4.50 - 10.70 10*3/mm3    RBC 4.00 3.90 - 5.20 10*6/mm3    Hemoglobin 12.6 11.9 - 15.5 g/dL    Hematocrit 39.4 35.6 - 45.5 %    MCV 98.5 (H) 80.5 - 98.2 fL    MCH 31.5 26.9 - 32.0 pg    MCHC 32.0 (L) 32.4 - 36.3 g/dL    RDW 12.5 11.7 - 13.0 %    RDW-SD 45.1 37.0 - 54.0 fl    MPV 10.2 6.0 - 12.0 fL    Platelets 196 140 - 500 10*3/mm3    Neutrophil % 66.2 42.7 - 76.0 %    Lymphocyte % 19.3 (L) 19.6 - 45.3 %    Monocyte % 12.5 (H)  5.0 - 12.0 %    Eosinophil % 1.6 0.3 - 6.2 %    Basophil % 0.2 0.0 - 1.5 %    Immature Grans % 0.2 0.0 - 0.5 %    Neutrophils, Absolute 5.70 1.90 - 8.10 10*3/mm3    Lymphocytes, Absolute 1.66 0.90 - 4.80 10*3/mm3    Monocytes, Absolute 1.08 0.20 - 1.20 10*3/mm3    Eosinophils, Absolute 0.14 0.00 - 0.70 10*3/mm3    Basophils, Absolute 0.02 0.00 - 0.20 10*3/mm3    Immature Grans, Absolute 0.02 0.00 - 0.03 10*3/mm3   Lipase   Result Value Ref Range    Lipase 68 (H) 13 - 60 U/L   Sedimentation Rate   Result Value Ref Range    Sed Rate 33 (H) 0 - 30 mm/hr   Light Blue Top   Result Value Ref Range    Extra Tube hold for add-on    Green Top (Gel)   Result Value Ref Range    Extra Tube Hold for add-ons.    Lavender Top   Result Value Ref Range    Extra Tube hold for add-on    Gold Top - SST   Result Value Ref Range    Extra Tube Hold for add-ons.        Assessment/Plan   Diagnoses and all orders for this visit:    Dizziness  -     CT Head Without Contrast; Future    Generalized abdominal pain  -     CT Abdomen Pelvis With Contrast; Future    Elevated lipase  -     CT Abdomen Pelvis With Contrast; Future    Other fatigue  -     CT Abdomen Pelvis With Contrast; Future  -     TSH  -     Vitamin B12    Other orders  -     nitroglycerin (NITROSTAT) 0.4 MG SL tablet;       Discussion/Summary  Katharine is here for dizziness and abd pain and chest wall pain. The chest wall pain is not reproducible on exam.  Her dizziness is not constant.  I am going to proceed with head imaging.  I am more concerned about her abd pain and mildly elevated lipase.  I am ordering a CT abd and pelvis for further evaluation.  I reviewed her CXR and EKG tracing and her labs from her recent ER visit.  She is also complaining of a lot of fatigue which is unusual for her.  Will add additional labs today.  We will follow up once all of these tests are done.  She has been worried about her bp but when we check it here it looks ok.  I have asked her to bring her  home cuff in the next time.  I suspect it's not reading accurately.    No Follow-up on file.

## 2017-01-16 NOTE — MR AVS SNAPSHOT
Katharine Mitchell   1/16/2017 11:00 AM   Office Visit    Dept Phone:  [phone]   Encounter #:  [id number]    Provider:  Goldie Madera MD   Department:  [facility] INTERNAL MEDICINE                Your Full Care Plan              Your Updated Medication List          This list is accurate as of: 1/16/17 12:05 PM.  Always use your most recent med list.                Acetaminophen 325 MG capsule   Commonly known as:  TYLENOL   Take 325 mg by mouth 4 (Four) Times a Day. May take 2 if needed       * aspirin 81 MG tablet       * ECOTRIN LOW STRENGTH 81 MG EC tablet   Generic drug:  aspirin       CALCIUM 600 + D PO       nitroglycerin 0.4 MG SL tablet   Commonly known as:  NITROSTAT       raNITIdine 150 MG tablet   Commonly known as:  ZANTAC   TAKE 1 TABLET TWICE A DAY       vitamin B-12 1000 MCG tablet   Commonly known as:  CYANOCOBALAMIN       Vitamin D-3 1000 UNITS capsule       * Notice:  This list has 2 medication(s) that are the same as other medications prescribed for you. Read the directions carefully, and ask your doctor or other care provider to review them with you.            We Performed the Following     TSH     Vitamin B12       You Were Diagnosed With        Codes Comments    Dizziness    -  Primary ICD-10-CM: R42  ICD-9-CM: 780.4     Generalized abdominal pain     ICD-10-CM: R10.84  ICD-9-CM: 789.07     Elevated lipase     ICD-10-CM: R74.8  ICD-9-CM: 790.5     Other fatigue     ICD-10-CM: R53.83  ICD-9-CM: 780.79       Instructions     None    Patient Instructions History      Upcoming Appointments     Visit Type Date Time Department    OFFICE VISIT 1/16/2017 11:00 AM [facility]    OFFICE VISIT 2/22/2017 12:30 PM [facility]    [facility] 3/9/2017  8:20 AM [facility]    OFFICE VISIT 3/16/2017 11:00 AM [facility]    CT JAVON CHEST HI RESOLUTION 4/12/2017 11:15 AM  [facility] CT      MyChart Signup     Our records indicate that you have an active  [facility] [employer] account.    You can view your After Visit Summary by going to [employer] and logging in with your [employer] username and password.  If you don't have a [employer] username and password but a parent or guardian has access to your record, the parent or guardian should login with their own [employer] username and password and access your record to view the After Visit Summary.    If you have questions, you can email [email or web address] or call [phone] to talk to our [employer] staff.  Remember, [employer] is NOT to be used for urgent needs.  For medical emergencies, dial 911.               Other Info from Your Visit           Your Appointments     Feb 22, 2017 12:30 PM EST   Office Visit with Nisa Hickey MD   [facility] NEUROLOGY (--)    [address], [address] [address] [phone]           Arrive 15 minutes prior to appointment.            Mar 09, 2017  8:20 AM EST   [facility] INTERNAL MEDICINE (--)    [address] [address] [phone]            Mar 16, 2017 11:00 AM EDT   Office Visit with Goldie Madera MD   [facility] INTERNAL MEDICINE (--)    [address] [address] [phone]           Arrive 15 minutes prior to appointment.            Apr 12, 2017 11:15 AM EDT   CT jay jay chest hi resolution with JAY JAY CT 2   [facility] CT [facility])    [address] [facility] [address] [phone]           Bring current list of meds Please arrive 15 minutes prior to exam              Allergies     Alendronate      Amlodipine      Aspirin      Axid [Nizatidine]      Azithromycin      Codeine      Donepezil      Upset stomach    Estrogens      Fentanyl      Fluticasone      Hydrocodone-acetaminophen      Levofloxacin      Naproxen      Niacin      Nsaids      Omeprazole      Other       "Oxycodone      Penicillins      Prednisone      Proton Pump Inhibitors      Rabeprazole      Raloxifene      Risedronate Sodium      Sulfa Antibiotics      Tetracyclines & Related        Reason for Visit     Dizziness Pt stated that all of her symptoms started about 5 days ago. Off and on.     Chest Pain Was seen in the ER for this on 1/11/7, was given nitro, but claims she is not taking this.       Vital Signs     Blood Pressure Pulse Respirations Height Weight Oxygen Saturation    134/80 64 18 64\" (162.6 cm) 108 lb (49 kg) 99%    Body Mass Index Smoking Status                18.54 kg/m2 Never Smoker          Problems and Diagnoses Noted     Dizziness    Tiredness    Generalized abdominal pain        Elevated lipase            "

## 2017-01-19 PROBLEM — I77.9 VERTEBRAL ARTERY DISEASE (HCC): Status: ACTIVE | Noted: 2017-01-01

## 2017-01-19 PROBLEM — I65.23 CALCIFICATION OF BOTH CAROTID ARTERIES: Status: ACTIVE | Noted: 2017-01-01

## 2017-02-22 NOTE — PROGRESS NOTES
Subjective   Katharine Mitchell is a 87 y.o. female with history of mild cognitive impairment and cervicogenic headaches came for follow-up appointment.    History of Present Illness   She was accompanied by her daughter and according to them, she has fluctuations in her memory but denies forgetting familiar names and patient is currently driving and denies any issues with driving and having occasional hallucinations and complaint with the medication Exelon patch and it was started recently and denies any side effects.  Tried donepezil and Namenda in the past with side effects.  Denies any resting tremor, cogwheel rigidity or shuffling type of gait.  Complaining of headaches mainly on the back of the head, sharp pain, not associated with nausea, photophobia and phonophobia and taking Aleve as needed.  Also complaining of neck pain and back pain issues and physical therapy helped her symptoms.  Denies any blurred vision, double vision, weakness, tingling sensation except for balance issues when walking and had 2 falls secondary to tripping.  Denies any passing out spells or recent hospitalizations since last visit.    The following portions of the patient's history were reviewed and updated as appropriate: allergies, current medications, past family history, past medical history, past social history, past surgical history and problem list.    Review of Systems   Constitutional: Negative for chills, fatigue and fever.   HENT: Positive for hearing loss. Negative for tinnitus and trouble swallowing.    Eyes: Negative for pain, itching and visual disturbance.   Respiratory: Positive for shortness of breath. Negative for wheezing.    Cardiovascular: Negative for chest pain, palpitations and leg swelling.   Gastrointestinal: Negative for constipation, diarrhea, nausea and vomiting.   Endocrine: Negative for cold intolerance and heat intolerance.   Genitourinary: Negative for decreased urine volume and urgency.    Musculoskeletal: Positive for back pain, gait problem (fell on 2-9-17) and neck pain.   Skin: Negative for pallor, rash and wound.   Allergic/Immunologic: Negative for environmental allergies and food allergies.   Neurological: Positive for dizziness, light-headedness, numbness (hands sometimes) and headaches. Negative for tremors and weakness.   Hematological: Does not bruise/bleed easily.   Psychiatric/Behavioral: Positive for confusion and hallucinations (started 2-8-17 but not now). Negative for sleep disturbance.       Objective   Physical Exam   Vitals reviewed.    GENERAL EXAMINATION : Patient is well-developed, well-nourished, well-groomed, alert and approriate in no apparent distress.  HEENT: Normocephalic, normal funduscopic exam, no visible oral lesions.  NECK : Normal range of motion, no tenderness, no malalignment.  CARDIAC : Regular rate and rhythm, no murmurs.  CHEST : Clear to auscultation, bilateral.   No wheezing .  ABDOMEN : Soft, non tender and non distended. EXTREMITIES: No edema. SKIN : No rashes or lesions visible. MUSCULOSKELETAL : No muscle weakness or muscle pain. No joint pains. PSYCHIATRIC : Awake and follwoing verbal commands well.     NEUROLOGICAL EXAMINATION  :  Higher integrative functions : No aphasia or dysarthria.  MMSE 23/30.  CRANIAL NERVES : Cranial nerve II: Normal visual acuity and visual fields.  On funduscopic exam, discs are flat with sharp margins cranial nerves III, IV, VI: Extraocular movements are full without nystagmus; pupils are equal, round and reactive to light.  Cranial nerve V: Normal facial sensation and strength of muscles of mastication.  Cranial nerve: VII: Facial movements are symmetric.  No weakness.  Cranial nerve VIII: Auditory acuity is normal.  Cranial nerve IX, X: Symmetric palate movement.  Cranial nerve XI sternocleidomastoid and trapezius are normal.  Cranial nerve XII: Tongue in the midline with no atrophy or fasciculations.      MOTOR : Normal  muscle strength and tone.  SENSATION : Normal to light touch, pinprick, vibration sensations intact and Romberg is negative.  MUSCLE STRETCH REFLEXES : Normal and symmetric in the upper extremities and lower extremities and the plantar responses are flexor bilaterally. COORDINATION : Finger to nose test showed no dysmetria.  Rapid alternating movements are normal.   GAIT : Walks on heels, toes, except for difficulty with tandem walk.    Assessment/Plan   Katharine was seen today for memory loss.    Diagnoses and all orders for this visit:    Cervicogenic headache  -     Ambulatory Referral to Physical Therapy Evaluate and treat    Mild cognitive impairment    Neck pain  -     Ambulatory Referral to Physical Therapy Evaluate and treat    Gait instability  -     Ambulatory Referral to Physical Therapy Evaluate and treat     87-year-old right-handed white female with history of mild cognitive impairment with cervicogenic headaches came for follow-up appointment and complaining of neck pain issues and balance problems.  On exam, no new focal deficits were seen except for difficulty with tandem walk and MMSE 23/30.  Discussed with the patient and her daughter regarding her signs and symptoms and told her to continue Exelon patch 4.6 mg/24 hours to apply daily and will consider increasing the dose in future for worsening of memory issues.  Discussed about brain exercises to help with the memory issues.  Discussed about fall precautions and ordered for physical therapy to help with the neck pain and balance issues.  We consider medication in future for worsening of cervicogenic headaches.  Will follow-up in 3 months or earlier if problems arise.    Thank you for allowing me to participate in the care of the patient.  Please feel free to call for any questions at your convenience.    Yours sincerely,    Nisa Hickey M.D

## 2017-02-27 NOTE — PROGRESS NOTES
Chief Complaint  Katharine Mitchell is a 87 y.o. female who presents for Back Pain (Pt had fallen a couple of weeks ago. )  .    History of Present Illness   About three weeks ago she fell backwards.  She had it checked out and was ok.  Her daughter took her out of town and she pulled something when she picked up her suitcase.  The pain is on the right side of her back.  Tylenol isn't really helping.  It hurts to wear a bra for the last few days.  No pain with coughing.  She has a little pain with taking a deep breath.  She has been to UC twice and had xrays both times which I have reviewed in detail here.      Review of Systems   Constitutional: Negative for chills and fever.   Respiratory: Negative for cough, chest tightness and shortness of breath.    Musculoskeletal: Positive for arthralgias and back pain.   Skin: Negative for rash and wound.       Patient Active Problem List   Diagnosis   • Left lower quadrant pain   • Right lower quadrant abdominal pain   • Anxiety disorder   • Aortic valve insufficiency   • Arthritis of hand   • Renal atrophy   • Altered bowel function   • Chronic kidney disease, stage III (moderate)   • Dizziness   • Fatigue   • Mild cognitive impairment   • Osteoporosis   • Impaired glucose tolerance   • Arthralgia of hip   • Shoulder pain   • Sinus bradycardia   • Spasm   • Cobalamin deficiency   • Chronic interstitial lung disease   • Abnormality of lung on CXR   • Pulmonary interstitial fibrosis   • Diverticulosis of large intestine   • Neck pain   • Pharyngeal dysphagia   • Cervicogenic headache   • Gait instability   • Calcification of both carotid arteries   • Vertebral artery disease       Past Medical History   Diagnosis Date   • Abdominal pain, LLQ (left lower quadrant)    • Abdominal pain, RLQ    • Abnormal kidney function study 05/04/2015     PT creelinor bumped up in Feb. Had a contrasted CT in Jan which amy contributed. I had spoken with her GYN who had been prescribing Boniva  when he contacted me with her labs. I recommended holding off giving her low creatinine clearance. He was supposed to discuss this with her.   • Abnormal TSH 05/04/2015     Recheck today.   • Anxiety      controlled sub agreement signed 6/16/14   • Aortic regurgitation    • Arthritis of hand, right    • Atrophy of right kidney    • Change in bowel movement    • CKD (chronic kidney disease) stage 3, GFR 30-59 ml/min    • Diverticulosis of large intestine      extensive   • Dizziness    • Fatigue    • GERD (gastroesophageal reflux disease)    • H/O colonoscopy      Complete (Fiberoptic)   • Hip pain, right    • Long Q-T syndrome    • Memory changes    • Osteoporosis      This has been managed by Dr. Grimes.  Her last DEXA 11/5/2012.  She had been on IV Boniva for quite a long time.   • Personal history of coronary atherosclerosis    • Personal history of urinary tract infection 06/16/2014     Repeat urine today.   • Pneumonia    • Prediabetes    • Shoulder pain, left    • Sinus bradycardia    • Vertigo 11/03/2014     Refill Meclizine for use as needed.   • Vitamin B12 deficiency        Past Surgical History   Procedure Laterality Date   • Breast surgery       Biopsy, Open   • Eye surgery       Cataract   • Cholecystectomy     • Cardiac catheterization         Family History   Problem Relation Age of Onset   • Dementia Mother    • Heart attack Father      Acute Myocardial Infarction   • Coronary artery disease Father    • Hypertension Father        Social History     Social History   • Marital status: Single     Spouse name: N/A   • Number of children: N/A   • Years of education: N/A     Occupational History   • Not on file.     Social History Main Topics   • Smoking status: Never Smoker   • Smokeless tobacco: Not on file   • Alcohol use Yes      Comment: social    • Drug use: No   • Sexual activity: Defer     Other Topics Concern   • Not on file     Social History Narrative       Current Outpatient Prescriptions on  "File Prior to Visit   Medication Sig Dispense Refill   • Acetaminophen (TYLENOL) 325 MG capsule Take 325 mg by mouth 4 (Four) Times a Day. May take 2 if needed  0   • aspirin (ECOTRIN LOW STRENGTH) 81 MG EC tablet Take 81 mg by mouth daily.     • Calcium Carb-Cholecalciferol (CALCIUM 600 + D PO) Take  by mouth daily.     • Cholecalciferol (VITAMIN D-3) 1000 UNITS capsule Take  by mouth.     • nitroglycerin (NITROSTAT) 0.4 MG SL tablet      • raNITIdine (ZANTAC) 150 MG tablet Take 1 tablet by mouth 2 (Two) Times a Day. 180 tablet 3   • vitamin B-12 (CYANOCOBALAMIN) 1000 MCG tablet Place  under the tongue.       No current facility-administered medications on file prior to visit.        Allergies   Allergen Reactions   • Alendronate    • Amlodipine    • Aspirin    • Axid [Nizatidine]    • Azithromycin    • Codeine    • Donepezil      Upset stomach   • Estrogens    • Fentanyl    • Fluticasone    • Hydrocodone-Acetaminophen    • Levofloxacin    • Naproxen    • Niacin    • Nsaids    • Omeprazole    • Other    • Oxycodone    • Penicillins    • Prednisone    • Proton Pump Inhibitors    • Rabeprazole    • Raloxifene    • Risedronate Sodium    • Sulfa Antibiotics    • Tetracyclines & Related        Vitals:    02/27/17 1536   BP: 136/88   Pulse: 90   Resp: 18   SpO2: 97%   Weight: 111 lb (50.3 kg)   Height: 64\" (162.6 cm)       Body mass index is 19.05 kg/(m^2).    Objective   Physical Exam   Constitutional: She is oriented to person, place, and time. She appears well-developed and well-nourished. No distress.   HENT:   Head: Normocephalic and atraumatic.   Musculoskeletal:        Cervical back: She exhibits normal range of motion, no tenderness, no bony tenderness and no pain.        Thoracic back: She exhibits normal range of motion, no tenderness, no bony tenderness, no pain and no spasm.        Lumbar back: She exhibits normal range of motion, no tenderness, no bony tenderness, no pain and no spasm.        " Back:    Neurological: She is alert and oriented to person, place, and time. She has normal strength. She exhibits normal muscle tone.   Skin: No abrasion, no bruising and no rash noted. She is not diaphoretic.   Psychiatric: Her speech is normal and behavior is normal. Thought content normal. Her mood appears anxious. She exhibits a depressed mood.   She definitely has difficulty with word finding when describing her situation.   She is stressed and anxious about her 's illness and her own pain.         Results for orders placed or performed during the hospital encounter of 01/17/17   POC Creatinine   Result Value Ref Range    Creatinine 1.10 0.60 - 1.30 mg/dL       Assessment/Plan   Diagnoses and all orders for this visit:    Right-sided chest wall pain  -     lidocaine (LIDODERM) 5 %; Place 1 patch on the skin Daily. Remove & Discard patch within 12 hours or as directed by MD Molina at home, initial encounter        Discussion/Summary  Katharine is here for acute care for right sided chest wall and back pain.  I have reviewed her xray reports.  She cannot take NSAIDS or pain meds.  We are going to try a lidoderm patch.  I have given her written instructions on how to use this.  I am hopeful that this gives her some relief.    No Follow-up on file.

## 2017-03-14 NOTE — TELEPHONE ENCOUNTER
Called pt and left her a voicemail stating that if she needed to reschedule her lab apt and her f/u apt with , because we know she has a lot going on with her 's death.

## 2017-04-10 NOTE — DISCHARGE INSTRUCTIONS
Rest.  Use a walker when up walking.  Ultram for pain.  Follow up with your doctor this week for stepped up level of care in her facility and possible physical therapy.

## 2017-04-10 NOTE — PROGRESS NOTES
Continued Stay Note  [facility]     Patient Name: Katharine Mitchell  MRN: [id number]  Today's Date: 4/10/2017    Admit Date: 4/10/2017          Discharge Plan       04/10/17 1820    Case Management/Social Work Plan    Plan Home    Additional Comments Gave pt and son the option of contacting Home Health agency of their choice tomorrow to setup service or private sitter service list for ADL's needs for tonight. Son stated he needed ADL's needs assisted with tonight , gave son list of agencies in the area for him to contact and make arrangements for pt. needs. CM will assist with HH setup if pt and son request.     Final Note    Final Note Sitter service list given to pt and son. Son denied the need for HH service currently.               Discharge Codes     None            Evelyn Solorzano RN

## 2017-04-10 NOTE — ED PROVIDER NOTES
Subjective   HPI Comments: 87-year-old female complains of severe right lower back pain.  The pain has been ongoing for about a week. The patient was seen at urgent treatment center in [place] and diagnosed with a UTI and discharged on Keflex and Pyridium.  She has had no improvement in her pain.  The patient normally lives in an assisted living facility.  Over the past couple of days her pain has been too severe to ambulate.  She has been staying with her son for the last few days.  Her son states that she is not able to get out of bed today due to pain.  She walked a small amount yesterday with pain.  The patient recalls a fall about one month ago when she landed on her bottom.  She had some low back pain at that time but it quickly resolved.  She has had no other falls.  No fever.  No nausea or vomiting.  No urinary symptoms.  Past medical history of mild dementia.  She is a nonsmoker.  No alcohol or drug use.    Patient is a 87 y.o. female presenting with back pain.   Back Pain   Pain location: mid to lower;  mostly on right lower back.  Quality:  Aching  Radiates to:  Does not radiate  Pain severity:  Severe  Pain is:  Same all the time  Onset quality:  Unable to specify  Duration: Worse over the past week.  Timing:  Constant  Progression:  Worsening  Chronicity:  New  Relieved by:  Being still  Worsened by:  Movement  Ineffective treatments:  None tried  Associated symptoms: abdominal pain (mild diffuse)    Associated symptoms: no bladder incontinence, no bowel incontinence, no chest pain, no dysuria, no fever, no headaches, no leg pain, no numbness, no perianal numbness, no tingling and no weakness        Review of Systems   Constitutional: Negative for chills and fever.   HENT: Negative for congestion, ear pain, nosebleeds, rhinorrhea and sore throat.    Eyes: Negative for pain, discharge and visual disturbance.   Respiratory: Negative for shortness of breath and wheezing.    Cardiovascular: Negative for  chest pain, palpitations and leg swelling.   Gastrointestinal: Positive for abdominal pain (mild diffuse). Negative for blood in stool, bowel incontinence, diarrhea, nausea and vomiting.   Endocrine: Negative.    Genitourinary: Negative for bladder incontinence, dysuria, hematuria and urgency.   Musculoskeletal: Positive for back pain and gait problem (unable to walk today due to pain in her back). Negative for arthralgias.   Skin: Negative for pallor and rash.   Allergic/Immunologic: Negative for immunocompromised state.   Neurological: Negative for dizziness, tingling, speech difficulty, weakness, numbness and headaches.   Hematological: Negative for adenopathy. Does not bruise/bleed easily.   Psychiatric/Behavioral: Negative.        Past Medical History:   Diagnosis Date   • Abdominal pain, LLQ (left lower quadrant)    • Abdominal pain, RLQ    • Abnormal kidney function study 05/04/2015    PT myra bumped up in Feb. Had a contrasted CT in Jan which liikely contributed. I had spoken with her GYN who had been prescribing Boniva when he contacted me with her labs. I recommended holding off giving her low creatinine clearance. He was supposed to discuss this with her.   • Abnormal TSH 05/04/2015    Recheck today.   • Anxiety     controlled sub agreement signed 6/16/14   • Aortic regurgitation    • Arthritis of hand, right    • Atrophy of right kidney    • Change in bowel movement    • CKD (chronic kidney disease) stage 3, GFR 30-59 ml/min    • Diverticulosis of large intestine     extensive   • Dizziness    • Fatigue    • GERD (gastroesophageal reflux disease)    • H/O colonoscopy     Complete (Fiberoptic)   • Hip pain, right    • Long Q-T syndrome    • Memory changes    • Osteoporosis     This has been managed by Dr. Grimes.  Her last DEXA 11/5/2012.  She had been on IV Boniva for quite a long time.   • Personal history of coronary atherosclerosis    • Personal history of urinary tract infection 06/16/2014     Repeat urine today.   • Pneumonia    • Prediabetes    • Shoulder pain, left    • Sinus bradycardia    • Vertigo 11/03/2014    Refill Meclizine for use as needed.   • Vitamin B12 deficiency        Allergies   Allergen Reactions   • Alendronate    • Amlodipine    • Aspirin    • Axid [Nizatidine]    • Azithromycin    • Codeine    • Donepezil      Upset stomach   • Estrogens    • Fentanyl    • Fluticasone    • Hydrocodone-Acetaminophen    • Levofloxacin    • Naproxen    • Niacin    • Nsaids    • Omeprazole    • Other    • Oxycodone    • Penicillins    • Prednisone    • Proton Pump Inhibitors    • Rabeprazole    • Raloxifene    • Risedronate Sodium    • Sulfa Antibiotics    • Tetracyclines & Related        Past Surgical History:   Procedure Laterality Date   • BREAST SURGERY      Biopsy, Open   • CARDIAC CATHETERIZATION     • CHOLECYSTECTOMY     • EYE SURGERY      Cataract       Family History   Problem Relation Age of Onset   • Dementia Mother    • Heart attack Father      Acute Myocardial Infarction   • Coronary artery disease Father    • Hypertension Father        Social History     Social History   • Marital status: Single     Spouse name: N/A   • Number of children: N/A   • Years of education: N/A     Social History Main Topics   • Smoking status: Never Smoker   • Smokeless tobacco: None   • Alcohol use Yes      Comment: social    • Drug use: No   • Sexual activity: Defer     Other Topics Concern   • None     Social History Narrative           Objective   Physical Exam   Constitutional: She is oriented to person, place, and time. She appears well-developed and well-nourished. She appears distressed (ppears uncomfortable).   HENT:   Head: Normocephalic and atraumatic.   Nose: Nose normal.   Mouth/Throat: Oropharynx is clear and moist.   Eyes: EOM are normal. Pupils are equal, round, and reactive to light. Left eye exhibits no discharge. No scleral icterus.   Neck: Normal range of motion. Neck supple.    Cardiovascular: Normal rate, regular rhythm, normal heart sounds and intact distal pulses.    No murmur heard.  Pulmonary/Chest: Effort normal and breath sounds normal. No respiratory distress. She has no wheezes. She has no rales. She exhibits no tenderness.   Abdominal: Soft. Bowel sounds are normal. There is tenderness (moderate diffuse lower abdominal tenderness).   Musculoskeletal: Normal range of motion. She exhibits tenderness (tender to palpation and percussion over the rightmid to lower back.  No tenderness in the midline.). She exhibits no edema.   Neurological: She is alert and oriented to person, place, and time.   Skin: Skin is warm and dry. No rash noted. She is not diaphoretic.   Psychiatric: She has a normal mood and affect.   Nursing note and vitals reviewed.      Procedures         ED Course  ED Course    CT of L-spine shows degenerative changes, otherwise no acute fx.  CT abd/pelvis shows some retained stool, otherwise negative.  Repeat exam shows pt to still have severe pain in right mid back on palpation or movement.  Will send for CT chest as well.  6:36 PM  CT scan of the chest shows marked COPD changes and chronic lung disease.  Nothing acute.  Her workup has not revealed the cause of her right-sided mid back pain.  The more I examine her on repeat exams, the more I think this is musculoskeletal. She is highly sensitive to pain medicines.  I was able to get her up and she was able to ambulate with assistance.  I think the best thing for her would be to see if she can have stepped up care at her assisted living facility in [place].  Her son wants to see what options there are for home health here in [place].  i will have the  talk with them.  I will plan to discharge on Ultram.    6:36 PM  Case management in to see pt.  Pt's son now states that he will take her home and try to get her into a step up placement at her assisted living facility.  Recent Results (from the past 24  hour(s))   Comprehensive Metabolic Panel    Collection Time: 04/10/17  1:44 PM   Result Value Ref Range    Glucose 119 (H) 70 - 100 mg/dL    BUN 14 9 - 23 mg/dL    Creatinine 0.90 0.60 - 1.30 mg/dL    Sodium 139 132 - 146 mmol/L    Potassium 3.9 3.5 - 5.5 mmol/L    Chloride 104 99 - 109 mmol/L    CO2 34.0 (H) 20.0 - 31.0 mmol/L    Calcium 10.0 8.7 - 10.4 mg/dL    Total Protein 7.4 5.7 - 8.2 g/dL    Albumin 3.50 3.20 - 4.80 g/dL    ALT (SGPT) 16 7 - 40 U/L    AST (SGOT) 26 0 - 33 U/L    Alkaline Phosphatase 88 25 - 100 U/L    Total Bilirubin 0.4 0.3 - 1.2 mg/dL    eGFR Non African Amer 59 (L) >60 mL/min/1.73    Globulin 3.9 gm/dL    A/G Ratio 0.9 (L) 1.5 - 2.5 g/dL    BUN/Creatinine Ratio 15.6 7.0 - 25.0    Anion Gap 1.0 (L) 3.0 - 11.0 mmol/L   Lipase    Collection Time: 04/10/17  1:44 PM   Result Value Ref Range    Lipase 46 6 - 51 U/L   CBC Auto Differential    Collection Time: 04/10/17  1:44 PM   Result Value Ref Range    WBC 9.67 3.50 - 10.80 10*3/mm3    RBC 4.05 3.89 - 5.14 10*6/mm3    Hemoglobin 12.9 11.5 - 15.5 g/dL    Hematocrit 40.4 34.5 - 44.0 %    MCV 99.8 (H) 80.0 - 99.0 fL    MCH 31.9 (H) 27.0 - 31.0 pg    MCHC 31.9 (L) 32.0 - 36.0 g/dL    RDW 13.1 11.3 - 14.5 %    RDW-SD 48.0 37.0 - 54.0 fl    MPV 9.4 6.0 - 12.0 fL    Platelets 237 150 - 450 10*3/mm3    Neutrophil % 68.7 41.0 - 71.0 %    Lymphocyte % 14.3 (L) 24.0 - 44.0 %    Monocyte % 14.9 (H) 0.0 - 12.0 %    Eosinophil % 1.7 0.0 - 3.0 %    Basophil % 0.2 0.0 - 1.0 %    Immature Grans % 0.2 0.0 - 0.6 %    Neutrophils, Absolute 6.65 1.50 - 8.30 10*3/mm3    Lymphocytes, Absolute 1.38 0.60 - 4.80 10*3/mm3    Monocytes, Absolute 1.44 (H) 0.00 - 1.00 10*3/mm3    Eosinophils, Absolute 0.16 0.10 - 0.30 10*3/mm3    Basophils, Absolute 0.02 0.00 - 0.20 10*3/mm3    Immature Grans, Absolute 0.02 0.00 - 0.03 10*3/mm3   Urinalysis With / Culture If Indicated    Collection Time: 04/10/17  2:12 PM   Result Value Ref Range    Color, UA Red (A) Yellow, Straw     Appearance, UA Cloudy (A) Clear    pH, UA <=5.0 5.0 - 8.0    Specific Gravity, UA 1.019 1.001 - 1.030    Glucose,  mg/dL (Trace) (A) Negative    Ketones, UA 15 mg/dL (1+) (A) Negative    Bilirubin, UA Small (1+) (A) Negative    Blood, UA Moderate (2+) (A) Negative    Protein,  mg/dL (2+) (A) Negative    Leuk Esterase, UA Moderate (2+) (A) Negative    Nitrite, UA Positive (A) Negative    Urobilinogen, UA 4.0 E.U./dL (A) 0.2 - 1.0 E.U./dL   Urinalysis, Microscopic Only    Collection Time: 04/10/17  2:12 PM   Result Value Ref Range    RBC, UA Too Numerous to Count (A) None Seen, 0-2 /HPF    WBC, UA 0-2 (A) None Seen /HPF    Bacteria, UA None Seen None Seen, Trace /HPF    Squamous Epithelial Cells, UA None Seen None Seen, 0-2 /HPF    Hyaline Casts, UA None Seen 0 - 6 /LPF    Methodology Automated Microscopy      Note: In addition to lab results from this visit, the labs listed above may include labs taken at another facility or during a different encounter within the last 24 hours. Please correlate lab times with ED admission and discharge times for further clarification of the services performed during this visit.    CT Abdomen Pelvis Without Contrast   Final Result   No acute intra-abdominal or pelvic abnormality is   identified. The appendix is normal. Moderate to large amount of stool   seen scattered throughout the colon.       D:  04/10/2017   E:  04/10/2017       This report was finalized on 4/10/2017 4:00 PM by Dr. Herlinda Evangelista MD.          CT Lumbar Spine Without Contrast   Final Result   Multilevel degenerative changes seen within the lumbar spine   most pronounced at the L1/L2 and L2/L3 level with mild retrolisthesis.   No fracture or dislocation identified.       D:  04/10/2017   E:  04/10/2017           This report was finalized on 4/10/2017 4:00 PM by Dr. Herlinda Evangelista MD.          CT Chest Without Contrast    (Results Pending)     Vitals:    04/10/17 1411 04/10/17 1500 04/10/17  1530 04/10/17 1600   BP: 158/69 142/77 156/81 157/87   Pulse:       Resp:       Temp:       TempSrc:       SpO2: 94%  94% 94%   Weight:       Height:         Medications   sodium chloride 0.9 % flush 10 mL (not administered)   ondansetron (ZOFRAN) injection 4 mg (4 mg Intravenous Given 4/10/17 1420)   Morphine sulfate (PF) injection 1 mg (1 mg Intravenous Given 4/10/17 1420)     ECG/EMG Results (last 24 hours)     ** No results found for the last 24 hours. **                        [facility]    Final diagnoses:   Acute right-sided low back pain without sciatica            SHELLEY Boo  04/10/17 3544

## 2017-04-11 NOTE — TELEPHONE ENCOUNTER
Please call and find out who needs this and where she is and why she needs it.  She can have PT/OT, I just need details on why she needs it.

## 2017-04-11 NOTE — TELEPHONE ENCOUNTER
Looks like PT was started in January, for unsteadiness on feet. Just need approval for continuation of care (plan of care approval). Usually fax us a paper copy to have in her chart every time there is a new approval needed. Pt is going to the [facility].

## 2017-04-12 PROBLEM — Z88.9 MULTIPLE DRUG ALLERGIES: Chronic | Status: ACTIVE | Noted: 2017-01-01

## 2017-04-12 PROBLEM — S22.000A THORACIC COMPRESSION FRACTURE (HCC): Status: ACTIVE | Noted: 2017-01-01

## 2017-04-12 NOTE — PROGRESS NOTES
Chief Complaint  Katharine Mitchell is a 87 y.o. female who presents for Back Pain and Anxiety  .    History of Present Illness   Right after Rudy  she fell at home.  Not a big fall.  She didn't see anyone.  She has moved to [facility].  Her memory is not as good.  She can't write a check.  This past Saturday, she called her son to say her back was killing her.  She went [place] [facility] [place] and told her she had a UTI.  Her pain continued to get worse.  Katharine went to stay with her son .  On Monday she couldn't get off the couch.  She went to [facility] in [place].  They ran lots of imaging.  The doctor gave her tramadol 50 mg.  She has taken this with food.    She went to have a CT of chest today and she couldn't lie on the table.  She has spasms in her back.      Review of Systems   Constitutional: Negative for unexpected weight change.   Musculoskeletal: Positive for back pain and gait problem.   Neurological: Positive for weakness. Negative for dizziness.   Psychiatric/Behavioral: Positive for confusion and dysphoric mood. The patient is nervous/anxious.        Patient Active Problem List   Diagnosis   • Left lower quadrant pain   • Right lower quadrant abdominal pain   • Anxiety disorder   • Aortic valve insufficiency   • Arthritis of hand   • Renal atrophy   • Altered bowel function   • Chronic kidney disease, stage III (moderate)   • Dizziness   • Fatigue   • Mild cognitive impairment   • Osteoporosis   • Impaired glucose tolerance   • Arthralgia of hip   • Shoulder pain   • Sinus bradycardia   • Spasm   • Cobalamin deficiency   • Chronic interstitial lung disease   • Abnormality of lung on CXR   • Pulmonary interstitial fibrosis   • Diverticulosis of large intestine   • Neck pain   • Pharyngeal dysphagia   • Cervicogenic headache   • Gait instability   • Calcification of both carotid arteries   • Vertebral artery disease       Past Medical History:   Diagnosis Date   • Abdominal pain, LLQ (left  lower quadrant)    • Abdominal pain, RLQ    • Abnormal kidney function study 05/04/2015    PT myra bumped up in Feb. Had a contrasted CT in Jan which amy contributed. I had spoken with her GYN who had been prescribing Boniva when he contacted me with her labs. I recommended holding off giving her low creatinine clearance. He was supposed to discuss this with her.   • Abnormal TSH 05/04/2015    Recheck today.   • Anxiety     controlled sub agreement signed 6/16/14   • Aortic regurgitation    • Arthritis of hand, right    • Atrophy of right kidney    • Change in bowel movement    • CKD (chronic kidney disease) stage 3, GFR 30-59 ml/min    • Diverticulosis of large intestine     extensive   • Dizziness    • Fatigue    • GERD (gastroesophageal reflux disease)    • H/O colonoscopy     Complete (Fiberoptic)   • Hip pain, right    • Long Q-T syndrome    • Memory changes    • Osteoporosis     This has been managed by Dr. Grimes.  Her last DEXA 11/5/2012.  She had been on IV Boniva for quite a long time.   • Personal history of coronary atherosclerosis    • Personal history of urinary tract infection 06/16/2014    Repeat urine today.   • Pneumonia    • Prediabetes    • Shoulder pain, left    • Sinus bradycardia    • Vertigo 11/03/2014    Refill Meclizine for use as needed.   • Vitamin B12 deficiency        Past Surgical History:   Procedure Laterality Date   • BREAST SURGERY      Biopsy, Open   • CARDIAC CATHETERIZATION     • CHOLECYSTECTOMY     • EYE SURGERY      Cataract       Family History   Problem Relation Age of Onset   • Dementia Mother    • Heart attack Father      Acute Myocardial Infarction   • Coronary artery disease Father    • Hypertension Father        Social History     Social History   • Marital status: Single     Spouse name: N/A   • Number of children: N/A   • Years of education: N/A     Occupational History   • Not on file.     Social History Main Topics   • Smoking status: Never Smoker   •  "Smokeless tobacco: Not on file   • Alcohol use Yes      Comment: social    • Drug use: No   • Sexual activity: Defer     Other Topics Concern   • Not on file     Social History Narrative       No current facility-administered medications on file prior to visit.      Current Outpatient Prescriptions on File Prior to Visit   Medication Sig Dispense Refill   • Acetaminophen (TYLENOL) 325 MG capsule Take 325 mg by mouth 4 (Four) Times a Day. May take 2 if needed  0   • aspirin (ECOTRIN LOW STRENGTH) 81 MG EC tablet Take 81 mg by mouth daily.     • Calcium Carb-Cholecalciferol (CALCIUM 600 + D PO) Take  by mouth daily.     • cephalexin (KEFLEX) 500 MG capsule Take 1 capsule by mouth 3 (Three) Times a Day for 7 days. 21 capsule 0   • Cholecalciferol (VITAMIN D-3) 1000 UNITS capsule Take  by mouth.     • lidocaine (LIDODERM) 5 % Place 1 patch on the skin Daily. Remove & Discard patch within 12 hours or as directed by MD 15 patch 1   • nitroglycerin (NITROSTAT) 0.4 MG SL tablet      • raNITIdine (ZANTAC) 150 MG tablet Take 1 tablet by mouth 2 (Two) Times a Day. 180 tablet 3   • traMADol (ULTRAM) 50 MG tablet Take 1 tablet by mouth Every 8 (Eight) Hours As Needed for Moderate Pain (4-6). 20 tablet 0   • vitamin B-12 (CYANOCOBALAMIN) 1000 MCG tablet Place  under the tongue.         Allergies   Allergen Reactions   • Alendronate    • Amlodipine    • Aspirin    • Axid [Nizatidine]    • Azithromycin    • Codeine    • Donepezil      Upset stomach   • Estrogens    • Fentanyl    • Fluticasone    • Hydrocodone-Acetaminophen    • Levofloxacin    • Naproxen    • Niacin    • Nsaids    • Omeprazole    • Other    • Oxycodone    • Penicillins    • Prednisone    • Proton Pump Inhibitors    • Rabeprazole    • Raloxifene    • Risedronate Sodium    • Sulfa Antibiotics    • Tetracyclines & Related        Vitals:    04/12/17 1344   BP: 124/60   Pulse: 89   Resp: 18   SpO2: 97%   Weight: 110 lb (49.9 kg)   Height: 66\" (167.6 cm)       Body mass " index is 17.75 kg/(m^2).    Objective   Physical Exam   Constitutional:  Non-toxic appearance. She has a sickly appearance. She appears distressed (in pain).   HENT:   Head: Normocephalic and atraumatic.   Eyes: Conjunctivae are normal. No scleral icterus.   Neck: Normal range of motion. Neck supple.   Musculoskeletal:        Cervical back: She exhibits no tenderness and no bony tenderness.        Thoracic back: She exhibits no tenderness and no bony tenderness.        Lumbar back: She exhibits no tenderness and no bony tenderness.   Lymphadenopathy:     She has no cervical adenopathy.   Neurological: She is alert. No cranial nerve deficit.   Psychiatric: Her mood appears not anxious. She is not agitated and not aggressive. Cognition and memory are impaired. She exhibits a depressed mood.       Results for orders placed or performed during the hospital encounter of 04/10/17   Urine Culture   Result Value Ref Range    Urine Culture No growth at 2 days    Comprehensive Metabolic Panel   Result Value Ref Range    Glucose 119 (H) 70 - 100 mg/dL    BUN 14 9 - 23 mg/dL    Creatinine 0.90 0.60 - 1.30 mg/dL    Sodium 139 132 - 146 mmol/L    Potassium 3.9 3.5 - 5.5 mmol/L    Chloride 104 99 - 109 mmol/L    CO2 34.0 (H) 20.0 - 31.0 mmol/L    Calcium 10.0 8.7 - 10.4 mg/dL    Total Protein 7.4 5.7 - 8.2 g/dL    Albumin 3.50 3.20 - 4.80 g/dL    ALT (SGPT) 16 7 - 40 U/L    AST (SGOT) 26 0 - 33 U/L    Alkaline Phosphatase 88 25 - 100 U/L    Total Bilirubin 0.4 0.3 - 1.2 mg/dL    eGFR Non African Amer 59 (L) >60 mL/min/1.73    Globulin 3.9 gm/dL    A/G Ratio 0.9 (L) 1.5 - 2.5 g/dL    BUN/Creatinine Ratio 15.6 7.0 - 25.0    Anion Gap 1.0 (L) 3.0 - 11.0 mmol/L   Urinalysis With / Culture If Indicated   Result Value Ref Range    Color, UA Red (A) Yellow, Straw    Appearance, UA Cloudy (A) Clear    pH, UA <=5.0 5.0 - 8.0    Specific Gravity, UA 1.019 1.001 - 1.030    Glucose,  mg/dL (Trace) (A) Negative    Ketones, UA 15 mg/dL  (1+) (A) Negative    Bilirubin, UA Small (1+) (A) Negative    Blood, UA Moderate (2+) (A) Negative    Protein,  mg/dL (2+) (A) Negative    Leuk Esterase, UA Moderate (2+) (A) Negative    Nitrite, UA Positive (A) Negative    Urobilinogen, UA 4.0 E.U./dL (A) 0.2 - 1.0 E.U./dL   Lipase   Result Value Ref Range    Lipase 46 6 - 51 U/L   CBC Auto Differential   Result Value Ref Range    WBC 9.67 3.50 - 10.80 10*3/mm3    RBC 4.05 3.89 - 5.14 10*6/mm3    Hemoglobin 12.9 11.5 - 15.5 g/dL    Hematocrit 40.4 34.5 - 44.0 %    MCV 99.8 (H) 80.0 - 99.0 fL    MCH 31.9 (H) 27.0 - 31.0 pg    MCHC 31.9 (L) 32.0 - 36.0 g/dL    RDW 13.1 11.3 - 14.5 %    RDW-SD 48.0 37.0 - 54.0 fl    MPV 9.4 6.0 - 12.0 fL    Platelets 237 150 - 450 10*3/mm3    Neutrophil % 68.7 41.0 - 71.0 %    Lymphocyte % 14.3 (L) 24.0 - 44.0 %    Monocyte % 14.9 (H) 0.0 - 12.0 %    Eosinophil % 1.7 0.0 - 3.0 %    Basophil % 0.2 0.0 - 1.0 %    Immature Grans % 0.2 0.0 - 0.6 %    Neutrophils, Absolute 6.65 1.50 - 8.30 10*3/mm3    Lymphocytes, Absolute 1.38 0.60 - 4.80 10*3/mm3    Monocytes, Absolute 1.44 (H) 0.00 - 1.00 10*3/mm3    Eosinophils, Absolute 0.16 0.10 - 0.30 10*3/mm3    Basophils, Absolute 0.02 0.00 - 0.20 10*3/mm3    Immature Grans, Absolute 0.02 0.00 - 0.03 10*3/mm3   Urinalysis, Microscopic Only   Result Value Ref Range    RBC, UA Too Numerous to Count (A) None Seen, 0-2 /HPF    WBC, UA 0-2 (A) None Seen /HPF    Bacteria, UA None Seen None Seen, Trace /HPF    Squamous Epithelial Cells, UA None Seen None Seen, 0-2 /HPF    Hyaline Casts, UA None Seen 0 - 6 /LPF    Methodology Automated Microscopy      CT results from this past weekend reviewed.  T8 compression fracture noted on CT chest that was not seen on the thoracic films from February.        Assessment/Plan   Diagnoses and all orders for this visit:    Traumatic compression fracture of T8 thoracic vertebra, closed, initial encounter    Acute bilateral thoracic back pain    Gait  instability    Other orders  -     rivastigmine (EXELON) 4.6 MG/24HR patch; ROSSANA 1 PA EXT TO THE SKIN D      Discussion/Summary  Katharine is here for acute care.  She was initially scheduled for a routine follow up, but she has been dealing with severe acute back pain since last week.   I have reviewed her most recent imaging and her plain films from Feb.  She appears to have a new T8 compression fracture compared to Feb.  I have called [facility] and bed board and we are going to admit to the hospital for pain management and possible intervention.     No Follow-up on file.

## 2017-04-12 NOTE — NURSING NOTE
"Previous nurse Beulah stated \" wait for  activitiy orders from Neuro D/T Lisa Avila said she will see patient tonight \".   "

## 2017-04-13 NOTE — H&P
PCP: Goldie Madera MD    Chief complaint back pain    HPI  Patient is a 87 y.o. female presents with history of osteoporosis who presents to Dr. Madera's office with back pain.  The son brought the patient in due to her continued back pain and decreased activities of daily living.  She states that the back pain started a couple weeks ago that it's not gotten any better.  She denies any fevers or chills.  Its moderate in nature its significantly worse when she moves around.  She has occasional tingling and numbness in bilateral legs.  Nothing is made it better.  It's in her middle of her back without radiation.  She denies trauma.      When worked up as an outpatient there was some component of abdominal pain initially and therefore CT abdomen and pelvis and chest and lumbar spine were performed.  There appears to be a fracture of T8 of indeterminate age and constipation and chronic lung disease.  Due to her uncontrolled pain and multiple allergies and decreased ADLs there was attempted to be made for the patient to be seen the next day by neurosurgery however schedule would not allow.      PAST MEDICAL HISTORY  Past Medical History:   Diagnosis Date   • Abdominal pain, LLQ (left lower quadrant)    • Abdominal pain, RLQ    • Abnormal kidney function study 05/04/2015    PT myra bumped up in Feb. Had a contrasted CT in Jan which liikely contributed. I had spoken with her GYN who had been prescribing Boniva when he contacted me with her labs. I recommended holding off giving her low creatinine clearance. He was supposed to discuss this with her.   • Abnormal TSH 05/04/2015    Recheck today.   • Anxiety     controlled sub agreement signed 6/16/14   • Aortic regurgitation    • Arthritis of hand, right    • Atrophy of right kidney    • Change in bowel movement    • CKD (chronic kidney disease) stage 3, GFR 30-59 ml/min    • Diverticulosis of large intestine     extensive   • Dizziness    • Fatigue    • GERD  (gastroesophageal reflux disease)    • H/O colonoscopy     Complete (Fiberoptic)   • Hip pain, right    • Long Q-T syndrome    • Memory changes    • Osteoporosis     This has been managed by Dr. Grimes.  Her last DEXA 11/5/2012.  She had been on IV Boniva for quite a long time.   • Personal history of coronary atherosclerosis    • Personal history of urinary tract infection 06/16/2014    Repeat urine today.   • Pneumonia    • Prediabetes    • Shoulder pain, left    • Sinus bradycardia    • Vertigo 11/03/2014    Refill Meclizine for use as needed.   • Vitamin B12 deficiency        PAST SURGICAL HISTORY  Past Surgical History:   Procedure Laterality Date   • BREAST SURGERY      Biopsy, Open   • CARDIAC CATHETERIZATION     • CHOLECYSTECTOMY     • EYE SURGERY      Cataract       FAMILY HISTORY  Family History   Problem Relation Age of Onset   • Dementia Mother    • Heart attack Father      Acute Myocardial Infarction   • Coronary artery disease Father    • Hypertension Father        SOCIAL HISTORY  Social History   Substance Use Topics   • Smoking status: Never Smoker   • Smokeless tobacco: Not on file   • Alcohol use Yes      Comment: social        MEDICATIONS:  Facility-Administered Medications Prior to Admission   Medication Dose Route Frequency Provider Last Rate Last Dose   • acetaminophen (TYLENOL) tablet 325 mg  325 mg Oral Q6H PRN Carlos Alonso MD         Prescriptions Prior to Admission   Medication Sig Dispense Refill Last Dose   • Acetaminophen (TYLENOL) 325 MG capsule Take 325 mg by mouth 4 (Four) Times a Day. May take 2 if needed  0 4/11/2017 at Unknown time   • Calcium Carb-Cholecalciferol (CALCIUM 600 + D PO) Take  by mouth daily.   4/11/2017 at Unknown time   • cephalexin (KEFLEX) 500 MG capsule Take 1 capsule by mouth 3 (Three) Times a Day for 7 days. 21 capsule 0 4/11/2017 at Unknown time   • Cholecalciferol (VITAMIN D-3) 1000 UNITS capsule Take  by mouth.   4/11/2017 at Unknown time   • lidocaine  (LIDODERM) 5 % Place 1 patch on the skin Daily. Remove & Discard patch within 12 hours or as directed by MD 15 patch 1 Past Week at Unknown time   • raNITIdine (ZANTAC) 150 MG tablet Take 1 tablet by mouth 2 (Two) Times a Day. 180 tablet 3 4/11/2017 at Unknown time   • rivastigmine (EXELON) 4.6 MG/24HR patch ROSSANA 1 PA EXT TO THE SKIN D  6 Past Week at Unknown time   • traMADol (ULTRAM) 50 MG tablet Take 1 tablet by mouth Every 8 (Eight) Hours As Needed for Moderate Pain (4-6). 20 tablet 0 4/12/2017 at 0830   • vitamin B-12 (CYANOCOBALAMIN) 1000 MCG tablet Place  under the tongue.   4/11/2017 at Unknown time   • aspirin (ECOTRIN LOW STRENGTH) 81 MG EC tablet Take 81 mg by mouth daily.   Taking   • nitroglycerin (NITROSTAT) 0.4 MG SL tablet    Unknown at Unknown time       Allergies:  Alendronate; Amlodipine; Aspirin; Axid [nizatidine]; Azithromycin; Codeine; Donepezil; Estrogens; Fentanyl; Fluticasone; Hydrocodone-acetaminophen; Levofloxacin; Naproxen; Niacin; Nsaids; Omeprazole; Other; Oxycodone; Penicillins; Prednisone; Proton pump inhibitors; Rabeprazole; Raloxifene; Risedronate sodium; Sulfa antibiotics; and Tetracyclines & related  Dose seemed to be intolerances and make her stomach upset and therefore she refuses to take.    Review of Systems:  Fatigue, arthritis, nausea, lots on intolerance to meds  Negative for following (except as per HPI):  Constitution: chills, fevers,   Eyes: change of vision, loss of vision and discharge  ENT: ear drainage, ear ringing and facial trauma  Respiratory: cough, pleuritic pain, shortness of air  Cardiovascular: chest pressure, pain, lower extremity edema, palpitations  Gastrointestinal: constipation, diarrhea, , vomiting, pain    Integument: rash and wound  Hematologic / Lymphatic: excessive bleeding and easy bruising  Musculoskeletal: , joint stiffness, joint swelling and muscle pain  Neurological: headaches, , seizures and tremors  Behavioral / Psych: anxiety, depression and  "hallucinations         Vital Signs  Temp:  [97.2 °F (36.2 °C)-97.6 °F (36.4 °C)] 97.2 °F (36.2 °C)  Heart Rate:  [64-89] 64  Resp:  [16-18] 16  BP: (124-162)/(60-84) 162/84  Flowsheet Rows         First Filed Value    Admission Height  66\" (167.6 cm) Documented at 04/12/2017 1610    Admission Weight  110 lb (49.9 kg) Documented at 04/12/2017 1610           Physical Exam:  General Appearance:    Alert, cooperative, in no acute distress   Head:    Normocephalic, without obvious abnormality, atraumatic   Eyes:         conjunctivae and sclerae normal, no icterus, PERRLA   ENT:    Ears grossly intact, oral mucosa moist,   Neck:   No adenopathy, supple, trachea midline,    Back:    grossly Normal to inspection, range of motion unable to eval due to pain, slight kyphotic   Lungs:     Clear to auscultation,respirations regular, even and                   unlabored    Heart:    Regular rhythm and normal rate,  no murmur, normal S1 and S2,   Abdomen:     Normal bowel sounds, no masses,  soft non-tender, non-distended,    Extremities:   Moves all extremities , no cyanosis, no edema,             Pulses:   Pulses palpable and equal bilaterally   Skin:   No bleeding, rash, occas bruising    Neurologic:      Psych:   Cranial nerves 2 - 12 grossly intact, sensation intact grossly,     Moves all extremities, equal bilateral strength 4/5 , i did not ambulate due to pain    Alert , Normal Affect   LABS:  Admission on 04/12/2017   Component Date Value Ref Range Status   • PTT 04/12/2017 30.6  22.7 - 35.4 seconds Final   • Creatine Kinase 04/12/2017 48  20 - 180 U/L Final   • Glucose 04/12/2017 125* 65 - 99 mg/dL Final   • BUN 04/12/2017 24* 8 - 23 mg/dL Final   • Creatinine 04/12/2017 1.02* 0.57 - 1.00 mg/dL Final   • Sodium 04/12/2017 139  136 - 145 mmol/L Final   • Potassium 04/12/2017 4.2  3.5 - 5.2 mmol/L Final   • Chloride 04/12/2017 99  98 - 107 mmol/L Final   • CO2 04/12/2017 30.5* 22.0 - 29.0 mmol/L Final   • Calcium 04/12/2017 " 9.4  8.6 - 10.5 mg/dL Final   • Total Protein 04/12/2017 7.0  6.0 - 8.5 g/dL Final   • Albumin 04/12/2017 3.30* 3.50 - 5.20 g/dL Final   • ALT (SGPT) 04/12/2017 90* 1 - 33 U/L Final   • AST (SGOT) 04/12/2017 83* 1 - 32 U/L Final   • Alkaline Phosphatase 04/12/2017 174* 39 - 117 U/L Final   • Total Bilirubin 04/12/2017 0.4  0.1 - 1.2 mg/dL Final   • eGFR Non African Amer 04/12/2017 51* >60 mL/min/1.73 Final   • Globulin 04/12/2017 3.7  gm/dL Final   • A/G Ratio 04/12/2017 0.9  g/dL Final   • BUN/Creatinine Ratio 04/12/2017 23.5  7.0 - 25.0 Final   • Anion Gap 04/12/2017 9.5  mmol/L Final   • WBC 04/12/2017 10.84* 4.50 - 10.70 10*3/mm3 Final   • RBC 04/12/2017 3.69* 3.90 - 5.20 10*6/mm3 Final   • Hemoglobin 04/12/2017 11.8* 11.9 - 15.5 g/dL Final   • Hematocrit 04/12/2017 36.7  35.6 - 45.5 % Final   • MCV 04/12/2017 99.5* 80.5 - 98.2 fL Final   • MCH 04/12/2017 32.0  26.9 - 32.0 pg Final   • MCHC 04/12/2017 32.2* 32.4 - 36.3 g/dL Final   • RDW 04/12/2017 13.1* 11.7 - 13.0 % Final   • RDW-SD 04/12/2017 47.6  37.0 - 54.0 fl Final   • MPV 04/12/2017 9.5  6.0 - 12.0 fL Final   • Platelets 04/12/2017 206  140 - 500 10*3/mm3 Final   • Neutrophil % 04/12/2017 70.2  42.7 - 76.0 % Final   • Lymphocyte % 04/12/2017 12.5* 19.6 - 45.3 % Final   • Monocyte % 04/12/2017 15.1* 5.0 - 12.0 % Final   • Eosinophil % 04/12/2017 1.7  0.3 - 6.2 % Final   • Basophil % 04/12/2017 0.2  0.0 - 1.5 % Final   • Immature Grans % 04/12/2017 0.3  0.0 - 0.5 % Final   • Neutrophils, Absolute 04/12/2017 7.62  1.90 - 8.10 10*3/mm3 Final   • Lymphocytes, Absolute 04/12/2017 1.35  0.90 - 4.80 10*3/mm3 Final   • Monocytes, Absolute 04/12/2017 1.64* 0.20 - 1.20 10*3/mm3 Final   • Eosinophils, Absolute 04/12/2017 0.18  0.00 - 0.70 10*3/mm3 Final   • Basophils, Absolute 04/12/2017 0.02  0.00 - 0.20 10*3/mm3 Final   • Immature Grans, Absolute 04/12/2017 0.03  0.00 - 0.03 10*3/mm3 Final   • Protime 04/12/2017 13.2  11.7 - 14.2 Seconds Final   • INR 04/12/2017  1.04  0.90 - 1.10 Final       DIAGNOSTICS:  Ct Abdomen Pelvis Without Contrast    Result Date: 4/10/2017  Narrative: EXAMINATION: CT ABDOMEN PELVIS WO CONTRAST-   INDICATION: diffuse abdominal pain; mid to low back pain  TECHNIQUE: Multiple axial CT imaging was obtained of the abdomen and pelvis from the lung bases through the pubic symphysis without the administration of oral or intravenous contrast.  The radiation dose reduction device was turned on for each scan per the ALARA (As Low as Reasonably Achievable) protocol.  COMPARISON: NONE  FINDINGS:  ABDOMEN: Chronic changes identified in the lung bases. The liver is homogeneous in appearance. Surgical clips in the right upper quadrant from prior cholecystectomy. Moderate size hiatal hernia. The spleen is homogeneous. Kidneys and adrenal glands are within normal limits. Vascular calcifications seen within the abdominal aorta and iliac vessels. There is stool scattered throughout the colon. The appendix is normal. No free fluid or free air. No abnormal mass or fluid collections identified.  PELVIS: The pelvic organs are unremarkable. Diverticulosis of the colon without evidence of diverticulitis. No pelvic adenopathy. No free fluid or free air. No abnormal mass or fluid collections identified. The bony structures reveal degenerative changes identified within the spine and pelvis.      Impression: No acute intra-abdominal or pelvic abnormality is identified. The appendix is normal. Moderate to large amount of stool seen scattered throughout the colon.  D:  04/10/2017 E:  04/10/2017  This report was finalized on 4/10/2017 4:00 PM by Dr. Herlinda Evangelista MD.      Ct Chest Without Contrast    Result Date: 4/11/2017  Narrative: EXAMINATION: CT CHEST WO CONTRAST- 04/10/2017  INDICATION: right sided back pain     TECHNIQUE: CT data set of the chest and mediastinum was performed without intravenous contrast.  The radiation dose reduction device was turned on for each scan  per the ALARA (As Low as Reasonably Achievable) protocol.  COMPARISON: NONE  FINDINGS: 1. There is mild cardiomegaly. Pericardial effusion is not identified. There is a large hiatus hernia in the lower chest. 2. Extreme parenchymal degenerative lung disease is noted. Cystic lung disease with honeycombing is noted in the upper mid and lower lung zones. There is centrilobular emphysema, marked obstructive lung disease and diffuse septal fibrosis. Therefore, there is widespread degenerative lung disease. Scattered perimeter nodularity is noted with pleural scarring. There is also marked fibrosis of the secondary pulmonary lobular septations. 3. There is mild to moderate tracheomegaly. 4. Pleural thickening and pleural fibrosis is seen globally and diffusely. The thyroid is multinodular. Superior and mid mediastinum are essentially clear. 5. Images into the upper abdomen are otherwise nonrevealing.      Impression: 1. Marked fibrosis diffusely in the lungs with cystic honeycombing, COPD and emphysema. Marked fibrotic scarring of the secondary pulmonary lobular septations are noted. There is . There is nodular thickening and global scarring involving the pleural reflections. 2. Lastly, there is compression fracture of T8 of indeterminate age. This does not appear to be a pathologic fracture.  D:  04/10/2017 E:  04/11/2017  This report was finalized on 4/11/2017 9:36 AM by Dr. Binh Olivares MD.      Ct Lumbar Spine Without Contrast    Result Date: 4/10/2017  Narrative: EXAMINATION: CT LUMBAR SPINE WO CONTRAST- 04/10/2017  INDICATION: mid to lower back pain, mostly right sided; diffuse abdominal pain, mid and lower back pain  TECHNIQUE: Multiple axial CT imaging was obtained of the lumbar spine without the administration of intravenous contrast.  The radiation dose reduction device was turned on for each scan per the ALARA (As Low as Reasonably Achievable) protocol.  COMPARISON: NONE  FINDINGS:  Atelectatic changes seen  "at the right lung base. There is extensive degenerative changes identified at the inferior endplate of L1. Mild retrolisthesis identified of L1 on L2 and L2 on L3. The vertebral body height is preserved. Facets are well aligned. Pedicles are intact. Schmorl's nodes seen inferiorly at the L1 level. Anterior spurring and posterior osteophyte formation seen most pronounced at the L1/L2 and L2/L3 level. The remainder of the levels are unremarkable and vascular calcifications are identified. The facets are well aligned. Pedicles are intact. No fracture or dislocation.      Impression: Multilevel degenerative changes seen within the lumbar spine most pronounced at the L1/L2 and L2/L3 level with mild retrolisthesis. No fracture or dislocation identified.  D:  04/10/2017 E:  04/10/2017   This report was finalized on 4/10/2017 4:00 PM by Dr. Herlinda Evangelista MD.           Results Review:   I reviewed the patient's new clinical results.  Discussed with pcp physician  Old records reviewed      ASSESSMENT AND PLAN    +  Thoracic compression fracture/Osteoporosis/severe back pain  -per Dr. Madera- patient tolerated tramadol at night.  Will increase frequency. Patient has a lot of intolerance to meds and it is difficult to know what is a true allergy  -consult neurosurg- likely to do a brace with PT/OT and see if improves- if doesn't improve with conservative measures- neurosurg will have to eval if further intervention is an option  -PT/OT to eval and treat  -labs ordered      +Diagnosed with recent UTI and decided that the abx upset her stomach so just quit taking it.  -check urinalysis and cx     + Mild cognitive impairment, Anxiety disorder, Chronic kidney disease, stage III (moderate), Impaired glucose tolerance    - stable  -continue medical managment       +COPD/ Pulmonary interstitial fibrosis with bronchiectasis and tracheomegaly  -start on duonebs -question if this is concerning to her \"pain\"     + Multiple drug " allergies  -seems that most are an upset stomach and intolerance    +constipation- ct full   -start miralax daily and senna s prn- patient can adjust as needed    +DVT prophy- lovenox 30    +MDM- son rodrigo Mitchell    I discussed the patients findings and my recommendations with the patient and/or family.  Please reference all orders placed.    Anusha Tucker MD  04/12/17  9:12 PM

## 2017-04-13 NOTE — SIGNIFICANT NOTE
04/13/17 1056   Rehab Treatment   Discipline physical therapist   Rehab Evaluation   Evaluation Not Performed unable to evaluate, medical status change  (PT orders per [facility]. MANUEL consulted, hold at this time per nsg. no brace bedside. will folllow up once appropriate. )

## 2017-04-13 NOTE — CONSULTS
Inpatient Consult to Neurosurgery  Consult performed by: BRO LACY  Consult ordered by: DANNY CATALAN  Reason for consult: T8 VCF  Assessment/Recommendations: Ms. Mitchell is an 87-year-old female the past medical history of dementia chronic kidney disease, COPD, osteoporosis.  She was recently on antibiotics for urinary tract infection but stopped the aunt to biotics because she did not like how they made her feel.  The patient lives in an assisted living facility and is fairly independent.  Without accident or injury she began complaining of pain along the thoracic and lumbar region a few weeks ago.  She denies any radicular pain or change in strength or sensation but states that the pain is limiting her ability to get out of bed and ambulate.  She was admitted to the hospital yesterday due to the pain.  She had an MRI of the thoracic and lumbar spine which I have reviewed.  It shows an acute appearing T8 compression fracture with approximately 60% loss of height with some retropulsion and mild stenosis.  She has significant degenerative disc disease in the lumbar spine but no other acute fractures.            Patient Care Team:  Goldie Madera MD as PCP - General (Internal Medicine)    Chief complaint:back pain    Subjective     HPI Comments: Again I did review the thoracic and lumbar MRI as discussed above.    ..Lab             04/13/17                       0357          WBC          10.37         HEMOGLOBIN   11.1*         HEMATOCRIT   34.8*         PLATELETS    192           ..Lab             04/12/17                       1825          INR          1.04          ..Lab             04/13/17                       0357          SODIUM       136           POTASSIUM    3.7           CHLORIDE     98            TOTAL CO2    27.7          BUN          20            CREATININE   0.80          GLUCOSE      100*          CALCIUM      8.8             .Brief Urine Lab Results  (Last result in the past 365  days)      Color   Clarity   Blood   Leuk Est   Nitrite   Protein   CREAT   Urine   HCG        04/12/17 2232 Yellow Cloudy(A) Large (3+)(A) Moderate (2+)(A) Negative   100 mg/dL (2+)(A)             Back Pain   The current episode started 1 to 4 weeks ago. The problem occurs constantly. The problem is unchanged. The pain is present in the lumbar spine and thoracic spine. The pain does not radiate. The pain is at a severity of 7/10. The pain is moderate. Associated symptoms include abdominal pain and leg pain. Pertinent negatives include no bladder incontinence, bowel incontinence, chest pain, fever, perianal numbness, weakness or weight loss. Risk factors include history of osteoporosis.       Review of Systems   Constitutional: Negative for fever and weight loss.   Cardiovascular: Negative for chest pain.   Gastrointestinal: Positive for abdominal pain. Negative for bowel incontinence.   Genitourinary: Negative for bladder incontinence and difficulty urinating.   Musculoskeletal: Positive for back pain.   Neurological: Negative for weakness.   All other systems reviewed and are negative.       Past Medical History:   Diagnosis Date   • Abdominal pain, LLQ (left lower quadrant)    • Abdominal pain, RLQ    • Abnormal kidney function study 05/04/2015    PT creelinor bumped up in Feb. Had a contrasted CT in Jan which liikely contributed. I had spoken with her GYN who had been prescribing Boniva when he contacted me with her labs. I recommended holding off giving her low creatinine clearance. He was supposed to discuss this with her.   • Abnormal TSH 05/04/2015    Recheck today.   • Anxiety     controlled sub agreement signed 6/16/14   • Aortic regurgitation    • Arthritis of hand, right    • Atrophy of right kidney    • Change in bowel movement    • CKD (chronic kidney disease) stage 3, GFR 30-59 ml/min    • Diverticulosis of large intestine     extensive   • Dizziness    • Fatigue    • GERD (gastroesophageal reflux  disease)    • H/O colonoscopy     Complete (Fiberoptic)   • Hip pain, right    • Long Q-T syndrome    • Memory changes    • Osteoporosis     This has been managed by Dr. Grimes.  Her last DEXA 11/5/2012.  She had been on IV Boniva for quite a long time.   • Personal history of coronary atherosclerosis    • Personal history of urinary tract infection 06/16/2014    Repeat urine today.   • Pneumonia    • Prediabetes    • Shoulder pain, left    • Sinus bradycardia    • Vertigo 11/03/2014    Refill Meclizine for use as needed.   • Vitamin B12 deficiency    ,   Past Surgical History:   Procedure Laterality Date   • BREAST SURGERY      Biopsy, Open   • CARDIAC CATHETERIZATION     • CHOLECYSTECTOMY     • EYE SURGERY      Cataract   ,   Family History   Problem Relation Age of Onset   • Dementia Mother    • Heart attack Father      Acute Myocardial Infarction   • Coronary artery disease Father    • Hypertension Father    ,   Social History   Substance Use Topics   • Smoking status: Never Smoker   • Smokeless tobacco: Not on file   • Alcohol use Yes      Comment: social    ,   Facility-Administered Medications Prior to Admission   Medication Dose Route Frequency Provider Last Rate Last Dose   • acetaminophen (TYLENOL) tablet 325 mg  325 mg Oral Q6H PRN Carlos Alonso MD         Prescriptions Prior to Admission   Medication Sig Dispense Refill Last Dose   • Acetaminophen (TYLENOL) 325 MG capsule Take 325 mg by mouth 4 (Four) Times a Day. May take 2 if needed  0 4/11/2017 at Unknown time   • Calcium Carb-Cholecalciferol (CALCIUM 600 + D PO) Take  by mouth daily.   4/11/2017 at Unknown time   • cephalexin (KEFLEX) 500 MG capsule Take 1 capsule by mouth 3 (Three) Times a Day for 7 days. 21 capsule 0 4/11/2017 at Unknown time   • Cholecalciferol (VITAMIN D-3) 1000 UNITS capsule Take  by mouth.   4/11/2017 at Unknown time   • lidocaine (LIDODERM) 5 % Place 1 patch on the skin Daily. Remove & Discard patch within 12 hours or as  directed by MD 15 patch 1 Past Week at Unknown time   • raNITIdine (ZANTAC) 150 MG tablet Take 1 tablet by mouth 2 (Two) Times a Day. 180 tablet 3 4/11/2017 at Unknown time   • rivastigmine (EXELON) 4.6 MG/24HR patch ROSSANA 1 PA EXT TO THE SKIN D  6 Past Week at Unknown time   • traMADol (ULTRAM) 50 MG tablet Take 1 tablet by mouth Every 8 (Eight) Hours As Needed for Moderate Pain (4-6). 20 tablet 0 4/12/2017 at 0830   • vitamin B-12 (CYANOCOBALAMIN) 1000 MCG tablet Place  under the tongue.   4/11/2017 at Unknown time   • aspirin (ECOTRIN LOW STRENGTH) 81 MG EC tablet Take 81 mg by mouth daily.   Taking   • nitroglycerin (NITROSTAT) 0.4 MG SL tablet    Unknown at Unknown time   , Scheduled Meds:    enoxaparin 30 mg Subcutaneous Nightly   ipratropium-albuterol 3 mL Nebulization 4x Daily - RT   lidocaine 1 patch Transdermal Q24H   oyster shell calcium/vitamin d 1 tablet Oral Daily   polyethylene glycol 17 g Oral Daily   vitamin B-12 500 mcg Oral Daily   , Continuous Infusions:   , PRN Meds:  •  acetaminophen  •  aluminum-magnesium hydroxide-simethicone  •  bisacodyl  •  calcium carbonate  •  magnesium hydroxide  •  ondansetron  •  sennosides-docusate sodium  •  sodium chloride  •  traMADol and Allergies:  Alendronate; Amlodipine; Aspirin; Axid [nizatidine]; Azithromycin; Codeine; Donepezil; Estrogens; Fentanyl; Fluticasone; Hydrocodone-acetaminophen; Levofloxacin; Naproxen; Niacin; Nsaids; Omeprazole; Other; Oxycodone; Penicillins; Prednisone; Proton pump inhibitors; Rabeprazole; Raloxifene; Risedronate sodium; Sulfa antibiotics; and Tetracyclines & related    Objective      Vital Signs  Temp:  [97 °F (36.1 °C)-98.1 °F (36.7 °C)] 97.4 °F (36.3 °C)  Heart Rate:  [64-93] 93  Resp:  [16-20] 18  BP: (124-162)/(60-89) 151/77    Physical Exam   Constitutional: She is oriented to person, place, and time. She appears well-developed and well-nourished.   HENT:   Head: Normocephalic and atraumatic.   Eyes: EOM are normal. Pupils  are equal, round, and reactive to light.   Neck: Normal range of motion.   Cardiovascular: Normal rate.    Pulmonary/Chest: Effort normal.   Abdominal: Soft.   Musculoskeletal: Normal range of motion.   Neurological: She is alert and oriented to person, place, and time. She has normal strength. No cranial nerve deficit or sensory deficit.   Skin: Skin is warm and dry.   Psychiatric: She has a normal mood and affect. Her behavior is normal. Judgment and thought content normal.   Nursing note and vitals reviewed.      Results Review:    I reviewed the patient's new clinical results.  I reviewed the patient's new imaging results and agree with the interpretation.        Assessment/Plan     Principal Problem:    Thoracic compression fracture  Active Problems:    Anxiety disorder    Chronic kidney disease, stage III (moderate)    Mild cognitive impairment    Osteoporosis    Impaired glucose tolerance    Pulmonary interstitial fibrosis    Multiple drug allergies      Assessment:  (COPD  CKD  UTI  T8 VCF  L spine DDD  Osteoporosis  T and L spine pain).     Plan:   (Most of her pain is in the upper lumbar region which does not correspond with her T8 fracture.  Also given her COPD and other medical issues surgery certainly presents some risk.  I spoke to the patient and her son about the MRI findings and about the fact that we could try treating the fracture with a brace and pain control.  They would prefer this route rather than to consider surgery.  I will order the brace and also ask therapy to see her.  There is a chance that she will finds the brace more irritating than helpful and I told the patient and her son that if she finds the brace uncomfortable she does not have to wear it but I would suggest trying it when she is up walking for extended periods.  She should also refrain from any lifting over 5 pounds or bending or twisting.  We will follow-up after she gets the brace to see how she does but from our  perspective she could go back to [facility] at any time and we will see her in about a month with new x-rays.).       I discussed the patients findings and my recommendations with patient and family    Jeanie Rockwell PA-C  04/13/17  1:08 PM    Time: 30min

## 2017-04-13 NOTE — PROGRESS NOTES
"Adult Nutrition  Assessment/PES    Patient Name:  Katharine Mitchell  YOB: 1929  MRN: [id number]  Admit Date:  4/12/2017    Assessment Date:  4/13/2017        Reason for Assessment       04/13/17 1132    Reason for Assessment    Reason For Assessment/Visit identified at risk by screening criteria    Identified At Risk By Screening Criteria BMI    Diagnosis --   fracture vertebra                Anthropometrics       04/13/17 1133    Anthropometrics (Special Considerations)    Height Used for Calculations 1.676 m (5' 6\")    Weight Used for Calculations 49.9 kg (110 lb)    RD Calculated     RD Calculated % IBW 84    RD Calculated BMI (kg/m2) 17.7    Body Mass Index (BMI)    BMI Grade 17 - 19 low grade I            Labs/Tests/Procedures/Meds       04/13/17 1133    Labs/Tests/Procedures/Meds    Diagnostic Test/Procedure Review reviewed    Labs/Tests Review Reviewed;Glucose    Medication Review Reviewed, pertinent   vitamin B12, D and miralax    Significant Vitals reviewed            Physical Findings       04/13/17 1134    Physical Findings/Assessment    Additional Documentation --   B=23            Estimated/Assessed Needs       04/13/17 1134    Calculation Measurements    Weight Used For Calculations 49.9 kg (110 lb)    Height Used for Calculations 1.676 m (5' 6\")    Estimated/Assessed Energy Needs    Energy Need Method Kcal/kg    kcal/kg 30    30 Kcal/Kg (kcal) 1496.88    Estimated/Assessed Protein Needs    Weight Used for Protein Calculation 49.9 kg (110 lb)    Protein (gm/kg) 1.2    1.2 Gm Protein (gm) 59.88    Estimated/Assessed Fluid Needs    Fluid Need Method RDA method    RDA Method (mL)  1500            Nutrition Prescription Ordered       04/13/17 1134    Nutrition Prescription PO    Common Modifiers Cardiac            Evaluation of Received Nutrient/Fluid Intake       04/13/17 1135    PO Evaluation    Number of Meals 1    % PO Intake 75              Problem/Interventions:        Problem 1  "      04/13/17 1135    Nutrition Diagnoses Problem 1    Problem 1 Underweight    Etiology (related to) MNT for Treatment/Condition    Signs/Symptoms (evidenced by) BMI    BMI 17 - 17.9                    Intervention Goal       04/13/17 1135    Intervention Goal    General Maintain nutrition    PO Tolerate PO;Increase intake    Weight Appropriate weight gain            Nutrition Intervention       04/13/17 1135    Nutrition Intervention    RD/Tech Action Follow Tx progress;Care plan reviewd;Interview for preference;Encourage intake            Nutrition Prescription       04/13/17 1135    Nutrition Prescription PO    PO Prescription Begin/change supplement    Supplement Ensure    Supplement Frequency 2 times a day    New PO Prescription Ordered? Yes            Education/Evaluation       04/13/17 1136    Education    Education Will Instruct as appropriate    Monitor/Evaluation    Monitor Per protocol    Education Follow-up Reinforce PRN          Electronically signed by:  Rosalia Ventura RD  04/13/17 11:36 AM

## 2017-04-13 NOTE — PLAN OF CARE
Problem: Patient Care Overview (Adult)  Goal: Plan of Care Review  Outcome: Ongoing (interventions implemented as appropriate)    04/13/17 1841   Coping/Psychosocial Response Interventions   Plan Of Care Reviewed With patient;son   Patient Care Overview   Progress progress toward functional goals as expected   Outcome Evaluation   Outcome Summary/Follow up Plan pt up in chair some confusion noted pain well controlled         Problem: Fall Risk (Adult)  Goal: Absence of Falls  Outcome: Ongoing (interventions implemented as appropriate)    04/13/17 1841   Fall Risk (Adult)   Absence of Falls achieves outcome         Problem: Self-Care Deficit (Adult,Obstetrics,Pediatric)  Goal: Improved Ability to Perform BADL and IADL  Outcome: Ongoing (interventions implemented as appropriate)    04/13/17 1841   Self-Care Deficit (Adult,Obstetrics,Pediatric)   Improved Ability to Perform BADL and IADL making progress toward outcome

## 2017-04-13 NOTE — SIGNIFICANT NOTE
04/13/17 1011   Rehab Treatment   Discipline occupational therapist   Rehab Evaluation   Evaluation Not Performed other (see comments)  (Note from nsg that pt has bedrest orders until fitted for brace.)   Recommendation   OT - Next Appointment 04/14/17

## 2017-04-13 NOTE — SIGNIFICANT NOTE
04/13/17 1425   Rehab Treatment   Discipline physical therapist   Rehab Evaluation   Evaluation Not Performed (await TLSO prior to PT eval)   Recommendation   PT - Next Appointment 04/14/17

## 2017-04-13 NOTE — PROGRESS NOTES
" LOS: 1 day     Name: Katharine Mitchell  Age: 87 y.o.  Sex: female  :  1929  MRN: [id number]         Primary Care Physician: Goldie Madera MD    Subjective   Subjective  C/o back pain    Objective   Vital Signs  Temp:  [97 °F (36.1 °C)-98.1 °F (36.7 °C)] 97.4 °F (36.3 °C)  Heart Rate:  [64-93] 93  Resp:  [16-20] 18  BP: (124-162)/(60-89) 151/77  Body mass index is 17.75 kg/(m^2).    Objective:  General Appearance:  Comfortable and in no acute distress.    Vital signs: (most recent): Blood pressure 151/77, pulse 93, temperature 97.4 °F (36.3 °C), temperature source Oral, resp. rate 18, height 66\" (167.6 cm), weight 110 lb (49.9 kg), SpO2 94 %.    Lungs:  Normal respiratory rate and normal effort.    Heart: Normal rate.  Regular rhythm.    Abdomen: Abdomen is soft.  Bowel sounds are normal.   There is no abdominal tenderness.     Extremities: There is no local swelling or dependent edema.    Neurological: Patient is alert.    Skin:  Warm and dry.              Results Review:       I reviewed the patient's new clinical results.      Results from last 7 days  Lab Units 17  0357 04/12/17  1825 04/10/17  1344   WBC 10*3/mm3 10.37 10.84* 9.67   HEMOGLOBIN g/dL 11.1* 11.8* 12.9   PLATELETS 10*3/mm3 192 206 237       Results from last 7 days  Lab Units 17  0357 17  1825 04/10/17  1344   SODIUM mmol/L 136 139 139   POTASSIUM mmol/L 3.7 4.2 3.9   CHLORIDE mmol/L 98 99 104   TOTAL CO2 mmol/L 27.7 30.5* 34.0*   BUN mg/dL 20 24* 14   CREATININE mg/dL 0.80 1.02* 0.90   CALCIUM mg/dL 8.8 9.4 10.0   GLUCOSE mg/dL 100* 125* 119*       Results from last 7 days  Lab Units 17   INR  1.04             Scheduled Meds:     enoxaparin 30 mg Subcutaneous Nightly   ipratropium-albuterol 3 mL Nebulization 4x Daily - RT   lidocaine 1 patch Transdermal Q24H   oyster shell calcium/vitamin d 1 tablet Oral Daily   polyethylene glycol 17 g Oral Daily   vitamin B-12 500 mcg Oral Daily     PRN Meds:   •  " acetaminophen  •  aluminum-magnesium hydroxide-simethicone  •  bisacodyl  •  calcium carbonate  •  magnesium hydroxide  •  ondansetron  •  sennosides-docusate sodium  •  sodium chloride  •  traMADol  Continuous Infusions:       Assessment/Plan   Principal Problem:    Thoracic compression fracture  Active Problems:    Anxiety disorder    Chronic kidney disease, stage III (moderate)    Mild cognitive impairment    Osteoporosis    Impaired glucose tolerance    Pulmonary interstitial fibrosis    Multiple drug allergies      Assessment & Plan    - Await MANUEL input.  Continue tramadol  - F/u Ucx.  No abx for now  - Lovenox for DVT proph  - PT to see when cleared by AMNUEL  - CCP to see for dispo planning    Rodri Meng MD  [facility]  04/13/17  12:37 PM

## 2017-04-13 NOTE — PLAN OF CARE
Problem: Patient Care Overview (Adult)  Goal: Plan of Care Review  Outcome: Ongoing (interventions implemented as appropriate)    04/13/17 0339   Coping/Psychosocial Response Interventions   Plan Of Care Reviewed With patient   Patient Care Overview   Progress no change   Outcome Evaluation   Outcome Summary/Follow up Plan pain well controlled. pt to be fitted for back brace today. bedrest tonight         Problem: Fall Risk (Adult)  Goal: Absence of Falls  Outcome: Ongoing (interventions implemented as appropriate)    04/13/17 0339   Fall Risk (Adult)   Absence of Falls making progress toward outcome         Problem: Self-Care Deficit (Adult,Obstetrics,Pediatric)  Goal: Identify Related Risk Factors and Signs and Symptoms  Outcome: Ongoing (interventions implemented as appropriate)    04/13/17 0339   Self-Care Deficit   Self-Care Deficit: Related Risk Factors fatigue/weakness;fear of falling;pain/discomfort   Signs and Symptoms (Self-Care Deficit) decreased functional activity tolerance;decreased range of motion

## 2017-04-14 NOTE — PLAN OF CARE
Problem: Patient Care Overview (Adult)  Goal: Plan of Care Review    04/14/17 1112   Coping/Psychosocial Response Interventions   Plan Of Care Reviewed With patient;daughter   Outcome Evaluation   Outcome Summary/Follow up Plan pt presents baseline w. mobility, no signficant impairements noted - safe household ambulator KENN mcadams,.pt states has all equipment needs at home. daughter bedside and to assist. no further skilled PT warranted. pt hopeful d/c today, will sign off.

## 2017-04-14 NOTE — TELEPHONE ENCOUNTER
Discharging her today and request pt/ot at home.     Approving the orders verbally.     Lisa with [facility].     [phone] / [phone]

## 2017-04-14 NOTE — PROGRESS NOTES
Acute Care - Occupational Therapy Initial Evaluation  [facility]     Patient Name: Katharine Mitchell  : 1929  MRN: [id number]  Today's Date: 2017     Date of Referral to OT: 17  Referring Physician: Garrett    Admit Date: 2017     No diagnosis found.  Patient Active Problem List   Diagnosis   • Left lower quadrant pain   • Right lower quadrant abdominal pain   • Anxiety disorder   • Aortic valve insufficiency   • Arthritis of hand   • Renal atrophy   • Altered bowel function   • Chronic kidney disease, stage III (moderate)   • Dizziness   • Fatigue   • Mild cognitive impairment   • Osteoporosis   • Impaired glucose tolerance   • Arthralgia of hip   • Shoulder pain   • Sinus bradycardia   • Spasm   • Cobalamin deficiency   • Chronic interstitial lung disease   • Abnormality of lung on CXR   • Pulmonary interstitial fibrosis   • Diverticulosis of large intestine   • Neck pain   • Pharyngeal dysphagia   • Cervicogenic headache   • Gait instability   • Calcification of both carotid arteries   • Vertebral artery disease   • Thoracic compression fracture   • Multiple drug allergies     Past Medical History:   Diagnosis Date   • Abdominal pain, LLQ (left lower quadrant)    • Abdominal pain, RLQ    • Abnormal kidney function study 2015    PT myra bumped up in Feb. Had a contrasted CT in  which amy contributed. I had spoken with her GYN who had been prescribing Boniva when he contacted me with her labs. I recommended holding off giving her low creatinine clearance. He was supposed to discuss this with her.   • Abnormal TSH 2015    Recheck today.   • Anxiety     controlled sub agreement signed 14   • Aortic regurgitation    • Arthritis of hand, right    • Atrophy of right kidney    • Change in bowel movement    • CKD (chronic kidney disease) stage 3, GFR 30-59 ml/min    • Diverticulosis of large intestine     extensive   • Dizziness    • Fatigue    • GERD (gastroesophageal  reflux disease)    • H/O colonoscopy     Complete (Fiberoptic)   • Hip pain, right    • Long Q-T syndrome    • Memory changes    • Osteoporosis     This has been managed by Dr. Grimes.  Her last DEXA 11/5/2012.  She had been on IV Boniva for quite a long time.   • Personal history of coronary atherosclerosis    • Personal history of urinary tract infection 06/16/2014    Repeat urine today.   • Pneumonia    • Prediabetes    • Shoulder pain, left    • Sinus bradycardia    • Vertigo 11/03/2014    Refill Meclizine for use as needed.   • Vitamin B12 deficiency      Past Surgical History:   Procedure Laterality Date   • BREAST SURGERY      Biopsy, Open   • CARDIAC CATHETERIZATION     • CHOLECYSTECTOMY     • EYE SURGERY      Cataract          OT ASSESSMENT FLOWSHEET (last 72 hours)      OT Evaluation       04/14/17 0958 04/13/17 1425 04/13/17 1056 04/13/17 1011 04/12/17 1552    Rehab Evaluation    Document Type evaluation  -SO        Subjective Information agree to therapy;complains of;pain  -SO        Evaluation Not Performed  --   await TLSO prior to PT eval  - unable to evaluate, medical status change   PT orders per [facility]. MANUEL consulted, hold at this time per nsg. no brace bedside. will folllow up once appropriate.   - other (see comments)   Note from nsg that pt has bedrest orders until fitted for brace.  -SO     Patient Effort, Rehab Treatment adequate  -SO        Symptoms Noted During/After Treatment none  -SO        Symptoms Noted Comment Nsg ok to work with pt, has been up walking with staff. Per Jeanie diaz if brace is uncomfortable pt does not have to wear. Pt very agitated about not being able to get up and transfer by herself, explained safety concerns  -SO        General Information    Patient Profile Review yes  -SO        Referring Physician Garrett  -SO        General Observations Pt sitting up in chair.  -SO        Pertinent History Of Current Problem Fall with compression fx  -SO         Precautions/Limitations fall precautions;brace on when up;spinal precautions   No lifting >5#, no bending/twisting  -SO        Prior Level of Function independent:;ADL's  -SO        Equipment Currently Used at Home none  -SO    none  -CB    Plans/Goals Discussed With patient;agreed upon  -SO        Barriers to Rehab cognitive status   Baseline dementia  -SO        Living Environment    Lives With facility resident  -SO    facility resident  -CB    Living Arrangements assisted living   [facility]  -SO    assisted living  -CB    Home Accessibility     bed and bath on same level  -CB    Stair Railings at Home     none  -CB    Type of Financial/Environmental Concern     none  -CB    Transportation Available     car  -CB    Clinical Impression    Date of Referral to OT 04/12/17  -SO        Criteria for Skilled Therapeutic Interventions Met yes  -SO        Rehab Potential good, to achieve stated therapy goals  -SO        Therapy Frequency 3-5 times/wk  -SO        Anticipated Discharge Disposition skilled nursing facility;assisted living  -SO        Functional Level Prior    Ambulation     0-->independent  -CB    Transferring     0-->independent  -CB    Toileting     0-->independent  -CB    Bathing     0-->independent  -CB    Dressing     0-->independent  -CB    Eating     0-->independent  -CB    Communication     0-->understands/communicates without difficulty  -CB    Swallowing     0-->swallows foods/liquids without difficulty  -CB    Pain Assessment    Pain Assessment 0-10  -SO        Pain Score 7  -SO        Pain Type Acute pain  -SO        Pain Location Back  -SO        Pain Orientation Lower  -SO        Pain Intervention(s) Repositioned  -SO        Cognitive Assessment/Intervention    Current Cognitive/Communication Assessment functional  -SO        Orientation Status oriented x 4  -SO        Follows Commands/Answers Questions able to follow multi-step instructions  -SO        Personal Safety decreased awareness, need for  assist;decreased awareness, need for safety;mild impairment  -SO        Personal Safety Interventions gait belt;fall prevention program maintained  -SO        ROM (Range of Motion)    General ROM no range of motion deficits identified  -SO        General ROM Detail BUE WFL  -SO        MMT (Manual Muscle Testing)    General MMT Assessment no strength deficits identified  -SO        General MMT Assessment Detail BUE WFL for age 4-/5  -SO        Bed Mobility, Assessment/Treatment    Bed Mobility, Comment Sitting up in chair  -SO        Transfer Assessment/Treatment    Transfers, Sit-Stand [place] contact guard assist;verbal cues required  -SO        Transfers, Stand-Sit [place] contact guard assist;verbal cues required  -SO        Transfers, Sit-Stand-Sit, Assist Device --   HHA  -SO        Toilet Transfer, [place] contact guard assist;verbal cues required  -SO        Toilet Transfer, Assistive Device --   HHA, grab bar  -SO        Functional Mobility    Functional Mobility- Ind. Level contact guard assist  -SO        Functional Mobility- Device --   HHA  -SO        Functional Mobility-Distance (Feet) 15  -SO        Lower Body Dressing Assessment/Training    LB Dressing Assess/Train, Clothing Type doffing:;donning:;slipper socks  -SO        LB Dressing Assess/Train, Assist Device reacher  -SO        LB Dressing Assess/Train, Position sitting  -SO        LB Dressing Assess/Train, Comment Demo use of reacher, pt also able to safely cross legs without pain  -SO        Toileting Assessment/Training    Toileting Assess/Train, Assistive Device grab bars  -SO        Toileting Assess/Train, Position sitting;standing  -SO        Toileting Assess/Train, Indepen Level contact guard assist  -SO        Grooming Assessment/Training    Grooming Assess/Train, Position standing;sink side  -SO        Grooming Assess/Train, Indepen Level contact guard assist;verbal cues required  -SO        Grooming Assess/Train, Comment  Assist with problem solving soap  -SO        Positioning and Restraints    Pre-Treatment Position sitting in chair/recliner  -SO        Post Treatment Position chair  -SO        In Chair sitting;call light within reach;encouraged to call for assist;exit alarm on;notified nsg  -SO          User Key  (r) = Recorded By, (t) = Taken By, (c) = Cosigned By    Initials Name Effective Dates    SO Marie Baez OTR 04/13/15 -     THADDEUS Gardner PT 02/07/17 -     ALE Rodriguez RN 06/16/16 -            Occupational Therapy Education     Title: PT OT SLP Therapies (Active)     Topic: Occupational Therapy (Active)     Point: ADL training (Done)    Description: Instruct learner(s) on proper safety adaptation and remediation techniques during self care or transfers.   Instruct in proper use of assistive devices.    Learning Progress Summary    Learner Readiness Method Response Comment Documented by Status   Patient Acceptance E VU,NR Spinal precautions SO 04/14/17 1003 Done               Point: Precautions (Done)    Description: Instruct learner(s) on prescribed precautions during self-care and functional transfers.    Learning Progress Summary    Learner Readiness Method Response Comment Documented by Status   Patient Acceptance E VU,NR Spinal precautions SO 04/14/17 1003 Done                      User Key     Initials Effective Dates Name Provider Type Discipline    SO 04/13/15 -  Marie Baez, OTR Occupational Therapist OT                  OT Recommendation and Plan  Anticipated Discharge Disposition: skilled nursing facility, assisted living  Therapy Frequency: 3-5 times/wk  Plan of Care Review  Plan Of Care Reviewed With: patient  Outcome Summary/Follow up Plan: Pt presents with T8 compression fx and has some confusion, pt agitated about not being able to get up independently. Spoke with nsg, ok'd to work with pt as she has been up walking without brace. Is to be fitted with brace today. Pt able to  walk to bathroom with CGA and don/doff socks without AE. May have difficulty remembering precautions as related to ADLs. Would benefit from OT to address deficits.          OT Goals       04/14/17 1003          Transfer Training OT LTG    Transfer Training OT LTG, Date Established 04/14/17  -SO      Transfer Training OT LTG, Time to Achieve 1 wk  -SO      Transfer Training OT LTG, Activity Type sit to stand/stand to sit;toilet  -SO      Transfer Training OT LTG, [place] Level conditional independence  -SO      ADL OT LTG    ADL OT LTG, Date Established 04/14/17  -SO      ADL OT LTG, Time to Achieve 1 wk  -SO      ADL OT LTG, Activity Type ADL skills  -SO      ADL OT LTG, [place] Level modified independent  -SO      ADL OT LTG, Additional Goal LBD with or without AE  -SO      Functional Mobility OT LTG    Functional Mobility Goal OT LTG, Date Established 04/14/17  -SO      Functional Mobility Goal OT LTG, Time to Achieve 1 wk  -SO      Functional Mobility Goal OT LTG, [place] Level modified independent  -SO      Functional Mobility Goal OT LTG, Distance to Achieve to the sink;to the bathroom  -SO        User Key  (r) = Recorded By, (t) = Taken By, (c) = Cosigned By    Initials Name Provider Type    SO Marie Baez, OTR Occupational Therapist                Outcome Measures       04/14/17 1000          How much help from another is currently needed...    Putting on and taking off regular lower body clothing? 3  -SO      Bathing (including washing, rinsing, and drying) 3  -SO      Toileting (which includes using toilet bed pan or urinal) 3  -SO      Putting on and taking off regular upper body clothing 3  -SO      Taking care of personal grooming (such as brushing teeth) 3  -SO      Eating meals 4  -SO      Score 19  -SO      Functional Assessment    Outcome Measure Options AM-PAC 6 Clicks Daily Activity (OT)  -SO        User Key  (r) = Recorded By, (t) = Taken By, (c) = Cosigned By    Initials  Name Provider Type    SO MAGNUS Allen Occupational Therapist          Time Calculation:   OT Start Time: 0935  OT Stop Time: 0950  OT Time Calculation (min): 15 min    Therapy Charges for Today     Code Description Service Date Service Provider Modifiers Qty    00300431479 HC OT EVAL LOW COMPLEXITY 2 4/14/2017 MAGNUS Allen GO 1               MAGNUS Allen  4/14/2017

## 2017-04-14 NOTE — PROGRESS NOTES
"   LOS: 2 days   Patient Care Team:  Goldie Madera MD as PCP - General (Internal Medicine)    Chief Complaint:     Thoracic compression fracture    Subjective     Back Pain   The problem is unchanged. The pain is present in the lumbar spine. The quality of the pain is described as aching. The pain does not radiate. The pain is mild. The symptoms are aggravated by position. Pertinent negatives include no chest pain. Risk factors include history of osteoporosis. She has tried analgesics for the symptoms.       Subjective:  Symptoms:  Stable.  No shortness of breath or chest pain.    Diet:  Adequate intake.    Activity level: Activity impairment: Currently up in chair.        History taken from: patient chart RN    Objective     TLSO brace just ordered this am; PT to see once patient has brace    Vital Signs  Temp:  [97.4 °F (36.3 °C)-99.4 °F (37.4 °C)] 99.4 °F (37.4 °C)  Heart Rate:  [72-94] 94  Resp:  [16-20] 18  BP: (137-163)/(77-88) 140/86    Objective:  General Appearance:  Comfortable.    Vital signs: (most recent): Blood pressure 140/86, pulse 94, temperature 99.4 °F (37.4 °C), temperature source Oral, resp. rate 18, height 66\" (167.6 cm), weight 110 lb (49.9 kg), SpO2 93 %.  Vital signs are normal.    Lungs:  Normal effort.    Heart: Normal rate.    Extremities: Normal range of motion.    Neurological: Patient is alert and oriented to person, place and time.  GCS score is 15.  (Tender to palpation in low back. States \"I hurt everywhere.\" No leg weakness or leg pain. ).    Skin:  Warm and dry.            Assessment/Plan     Principal Problem:    Thoracic compression fracture  Active Problems:    Anxiety disorder    Chronic kidney disease, stage III (moderate)    Mild cognitive impairment    Osteoporosis    Impaired glucose tolerance    Pulmonary interstitial fibrosis    Multiple drug allergies      Assessment & Plan     Acute T8 compression fracture  Osteoporosis  Lumbar degenerative facet joint disease "   Lumbar pain    Will await brace  Will check back to see how she is doing in brace  If symptoms not better; we will be happy to see as outpatient    RICHY Madsen  04/14/17  9:23 AM

## 2017-04-14 NOTE — PLAN OF CARE
Problem: Patient Care Overview (Adult)  Goal: Plan of Care Review    04/14/17 1003   Coping/Psychosocial Response Interventions   Plan Of Care Reviewed With patient   Outcome Evaluation   Outcome Summary/Follow up Plan Pt presents with T8 compression fx and has some confusion, pt agitated about not being able to get up independently. Spoke with nsg, ok'd to work with pt as she has been up walking without brace. Is to be fitted with brace today. Pt able to walk to bathroom with CGA and don/doff socks without AE. May have difficulty remembering precautions as related to ADLs. Would benefit from OT to address deficits.         Problem: Inpatient Occupational Therapy  Goal: Transfer Training Goal 1 LTG- OT    04/14/17 1003   Transfer Training OT LTG   Transfer Training OT LTG, Date Established 04/14/17   Transfer Training OT LTG, Time to Achieve 1 wk   Transfer Training OT LTG, Activity Type sit to stand/stand to sit;toilet   Transfer Training OT LTG, [place] Level conditional independence       Goal: ADL Goal LTG- OT    04/14/17 1003   ADL OT LTG   ADL OT LTG, Date Established 04/14/17   ADL OT LTG, Time to Achieve 1 wk   ADL OT LTG, Activity Type ADL skills   ADL OT LTG, [place] Level modified independent   ADL OT LTG, Additional Goal LBD with or without AE       Goal: Functional Mobility Goal LTG- OT    04/14/17 1003   Functional Mobility OT LTG   Functional Mobility Goal OT LTG, Date Established 04/14/17   Functional Mobility Goal OT LTG, Time to Achieve 1 wk   Functional Mobility Goal OT LTG, [place] Level modified independent   Functional Mobility Goal OT LTG, Distance to Achieve to the sink;to the bathroom

## 2017-04-14 NOTE — DISCHARGE INSTR - ACTIVITY
Ambulate every hour or more if needed  Wear back brace at all times when up ambulating  Take pain medication as prescribed by your physician  No driving until cleared by your physician

## 2017-04-14 NOTE — THERAPY DISCHARGE NOTE
Acute Care - Physical Therapy Initial Eval/Discharge  [facility]     Patient Name: Katharine Mitchell  : 1929  MRN: [id number]  Today's Date: 2017   Onset of Illness/Injury or Date of Surgery Date: 17  Date of Referral to PT: 17  Referring Physician: Garrett      Admit Date: 2017    Visit Dx:    ICD-10-CM ICD-9-CM   1. Thoracic compression fracture, closed, initial encounter S22.000A 805.2   2. Muscle weakness (generalized) M62.81 728.87     Patient Active Problem List   Diagnosis   • Left lower quadrant pain   • Right lower quadrant abdominal pain   • Anxiety disorder   • Aortic valve insufficiency   • Arthritis of hand   • Renal atrophy   • Altered bowel function   • Chronic kidney disease, stage III (moderate)   • Dizziness   • Fatigue   • Mild cognitive impairment   • Osteoporosis   • Impaired glucose tolerance   • Arthralgia of hip   • Shoulder pain   • Sinus bradycardia   • Spasm   • Cobalamin deficiency   • Chronic interstitial lung disease   • Abnormality of lung on CXR   • Pulmonary interstitial fibrosis   • Diverticulosis of large intestine   • Neck pain   • Pharyngeal dysphagia   • Cervicogenic headache   • Gait instability   • Calcification of both carotid arteries   • Vertebral artery disease   • Thoracic compression fracture   • Multiple drug allergies     Past Medical History:   Diagnosis Date   • Abdominal pain, LLQ (left lower quadrant)    • Abdominal pain, RLQ    • Abnormal kidney function study 2015    PT creelinor bumped up in Feb. Had a contrasted CT in  which isabellaly contributed. I had spoken with her GYN who had been prescribing Boniva when he contacted me with her labs. I recommended holding off giving her low creatinine clearance. He was supposed to discuss this with her.   • Abnormal TSH 2015    Recheck today.   • Anxiety     controlled sub agreement signed 14   • Aortic regurgitation    • Arthritis of hand, right    • Atrophy of right kidney     • Change in bowel movement    • CKD (chronic kidney disease) stage 3, GFR 30-59 ml/min    • Diverticulosis of large intestine     extensive   • Dizziness    • Fatigue    • GERD (gastroesophageal reflux disease)    • H/O colonoscopy     Complete (Fiberoptic)   • Hip pain, right    • Long Q-T syndrome    • Memory changes    • Osteoporosis     This has been managed by Dr. Grimes.  Her last DEXA 11/5/2012.  She had been on IV Boniva for quite a long time.   • Personal history of coronary atherosclerosis    • Personal history of urinary tract infection 06/16/2014    Repeat urine today.   • Pneumonia    • Prediabetes    • Shoulder pain, left    • Sinus bradycardia    • Vertigo 11/03/2014    Refill Meclizine for use as needed.   • Vitamin B12 deficiency      Past Surgical History:   Procedure Laterality Date   • BREAST SURGERY      Biopsy, Open   • CARDIAC CATHETERIZATION     • CHOLECYSTECTOMY     • EYE SURGERY      Cataract          PT ASSESSMENT (last 72 hours)      PT Evaluation       04/14/17 1100 04/14/17 0958    Rehab Evaluation    Document Type evaluation;discharge summary  - evaluation  -SO    Subjective Information agree to therapy;no complaints  - agree to therapy;complains of;pain  -SO    Patient Effort, Rehab Treatment good  - adequate  -SO    Symptoms Noted During/After Treatment none  - none  -SO    Symptoms Noted Comment TLSO donned   - Nsg ok to work with pt, has been up walking with staff. Per Jeanie diaz if brace is uncomfortable pt does not have to wear. Pt very agitated about not being able to get up and transfer by herself, explained safety concerns  -SO    General Information    Patient Profile Review yes  - yes  -SO    Onset of Illness/Injury or Date of Surgery Date 04/12/17  -     Referring Physician Garrett  - Garrett  -SO    General Observations seated up in chair no acute distress  - Pt sitting up in chair.  -SO    Pertinent History Of Current Problem VCF - TLSO, non operative   -LH Fall with compression fx  -SO    Precautions/Limitations fall precautions;brace on when up   TLSO  -LH fall precautions;brace on when up;spinal precautions   No lifting >5#, no bending/twisting  -SO    Prior Level of Function independent:  -LH independent:;ADL's  -SO    Equipment Currently Used at Home none  -LH none  -SO    Plans/Goals Discussed With patient;family;agreed upon  -LH patient;agreed upon  -SO    Barriers to Rehab  cognitive status   Baseline dementia  -SO    Living Environment    Lives With  facility resident  -SO    Living Arrangements  assisted living   [facility]  -SO    Clinical Impression    Date of Referral to PT 04/14/17  -     PT Diagnosis baseline mobility  -     Criteria for Skilled Therapeutic Interventions Met no problems identified which require skilled intervention  -LH     Pain Assessment    Pain Assessment 0-10  -LH 0-10  -SO    Pain Score 5  -LH 7  -SO    Post Pain Score 5  -LH     Pain Type Acute pain  -LH Acute pain  -SO    Pain Location Back  -LH Back  -SO    Pain Orientation  Lower  -SO    Pain Intervention(s) Repositioned;Ambulation/increased activity  - Repositioned  -SO    Cognitive Assessment/Intervention    Current Cognitive/Communication Assessment functional  - functional  -SO    Orientation Status oriented x 4  -LH oriented x 4  -SO    Follows Commands/Answers Questions able to follow multi-step instructions;100% of the time  -LH able to follow multi-step instructions  -SO    Personal Safety decreased awareness, need for safety  - decreased awareness, need for assist;decreased awareness, need for safety;mild impairment  -SO    Personal Safety Interventions gait belt;fall prevention program maintained;nonskid shoes/slippers when out of bed;supervised activity  - gait belt;fall prevention program maintained  -SO    ROM (Range of Motion)    General ROM no range of motion deficits identified  -LH no range of motion deficits identified  -SO    General ROM Detail   BUE WFL  -SO    MMT (Manual Muscle Testing)    General MMT Assessment no strength deficits identified  - no strength deficits identified  -SO    General MMT Assessment Detail grossly at least 3/5  - BUE WFL for age 4-/5  -SO    Bed Mobility, Assessment/Treatment    Bed Mobility, Comment up in chair  -LH Sitting up in chair  -SO    Transfer Assessment/Treatment    Transfers, Sit-Stand [employer] stand by assist  - contact guard assist;verbal cues required  -SO    Transfers, Stand-Sit [employer] stand by assist  - contact guard assist;verbal cues required  -SO    Transfers, Sit-Stand-Sit, Assist Device  --   HHA  -SO    Toilet Transfer, [employer]  contact guard assist;verbal cues required  -SO    Toilet Transfer, Assistive Device  --   HHA, grab bar  -SO    Transfer, Safety Issues step length decreased;weight-shifting ability decreased  -     Gait Assessment/Treatment    Gait, [employer] Level stand by assist  -     Gait, Assistive Device --   no AD  -     Gait, Distance (Feet) 250  -     Gait, Safety Issues step length decreased;weight-shifting ability decreased  -     Gait, Comment TLSO donned - good tolerance  -     Positioning and Restraints    Pre-Treatment Position sitting in chair/recliner  - sitting in chair/recliner  -SO    Post Treatment Position chair  - chair  -SO    In Chair sitting;call light within reach;encouraged to call for assist;with family/caregiver  - sitting;call light within reach;encouraged to call for assist;exit alarm on;notified nsg  -SO      04/13/17 1425 04/13/17 1056    Rehab Evaluation    Evaluation Not Performed --   await TLSO prior to PT eval  - unable to evaluate, medical status change   PT orders per [facility]. MANUEL consulted, hold at this time per nsg. no brace bedside. will folllow up once appropriate.   -      04/13/17 1011 04/12/17 1552    Rehab Evaluation    Evaluation Not Performed other (see comments)   Note from nsg that pt has bedrest orders  until fitted for brace.  -SO     General Information    Equipment Currently Used at Home  none  -CB    Living Environment    Lives With  facility resident  -CB    Living Arrangements  assisted living  -CB    Home Accessibility  bed and bath on same level  -CB    Stair Railings at Home  none  -CB    Type of Financial/Environmental Concern  none  -CB    Transportation Available  car  -CB      User Key  (r) = Recorded By, (t) = Taken By, (c) = Cosigned By    Initials Name Provider Type    SO Marie Baez, OTR Occupational Therapist     Lisa Gardner, PT Physical Therapist    ALE Rodriguez RN Registered Nurse          Physical Therapy Education     Title: PT OT SLP Therapies (Active)     Topic: Physical Therapy (Resolved)     Point: Mobility training (Resolved)    Learning Progress Summary    Learner Readiness Method Response Comment Documented by Status   Patient Acceptance E DU,VU pt and daughter educated on moiblity recommendation.  04/14/17 1111 Done   Family Acceptance E DU,VU pt and daughter educated on moiblity recommendation.  04/14/17 1111 Done               Point: Home exercise program (Resolved)    Learning Progress Summary    Learner Readiness Method Response Comment Documented by Status   Patient Acceptance E DU,VU pt and daughter educated on moiblity recommendation.  04/14/17 1111 Done   Family Acceptance E DU,VU pt and daughter educated on moiblity recommendation.  04/14/17 1111 Done                      User Key     Initials Effective Dates Name Provider Type Discipline     02/07/17 -  Lisa Gardner, PT Physical Therapist PT                PT Recommendation and Plan  Anticipated Equipment Needs At Discharge:  (owns rwx and cane, no equipment needs)  Anticipated Discharge Disposition: home with home health  PT Frequency: evaluation only  Plan of Care Review  Plan Of Care Reviewed With: patient, daughter  Outcome Summary/Follow up Plan: pt presents baseline w. mobility, no  signficant impairements noted - safe household ambulator KENN mcadams,.pt states has all equipment needs at home. daughter bedside and to assist. no further skilled PT warranted. pt hopeful d/c today, will sign off.                Outcome Measures       04/14/17 1100 04/14/17 1000       How much help from another person do you currently need...    Turning from your back to your side while in flat bed without using bedrails? 3  -LH      Moving from lying on back to sitting on the side of a flat bed without bedrails? 3  -LH      Moving to and from a bed to a chair (including a wheelchair)? 3  -LH      Standing up from a chair using your arms (e.g., wheelchair, bedside chair)? 3  -LH      Climbing 3-5 steps with a railing? 3  -LH      To walk in hospital room? 3  -LH      AM-PAC 6 Clicks Score 18  -LH      How much help from another is currently needed...    Putting on and taking off regular lower body clothing?  3  -SO     Bathing (including washing, rinsing, and drying)  3  -SO     Toileting (which includes using toilet bed pan or urinal)  3  -SO     Putting on and taking off regular upper body clothing  3  -SO     Taking care of personal grooming (such as brushing teeth)  3  -SO     Eating meals  4  -SO     Score  19  -SO     Functional Assessment    Outcome Measure Options AM-PAC 6 Clicks Basic Mobility (PT)  -LH AM-PAC 6 Clicks Daily Activity (OT)  -SO       User Key  (r) = Recorded By, (t) = Taken By, (c) = Cosigned By    Initials Name Provider Type    SO Marie Baez, OTR Occupational Therapist     Lisa Gardner PT Physical Therapist           Time Calculation:         PT Charges       04/14/17 1114          Time Calculation    Start Time 1050  -      Stop Time 1100  -      Time Calculation (min) 10 min  -      PT Received On 04/14/17  -        User Key  (r) = Recorded By, (t) = Taken By, (c) = Cosigned By    Initials Name Provider Type     Lisa Gardner PT Physical Therapist           Therapy Charges for Today     Code Description Service Date Service Provider Modifiers Qty    44280895636 HC PT EVAL LOW COMPLEXITY 2 4/14/2017 Lisa Gardner, PT GP 1          PT G-Codes  Outcome Measure Options: AM-PAC 6 Clicks Basic Mobility (PT)    PT Discharge Summary  Anticipated Discharge Disposition: home with home health    Lisa Gardner, PT  4/14/2017

## 2017-04-14 NOTE — PLAN OF CARE
Problem: Patient Care Overview (Adult)  Goal: Plan of Care Review  Outcome: Ongoing (interventions implemented as appropriate)    04/14/17 3366   Coping/Psychosocial Response Interventions   Plan Of Care Reviewed With patient;daughter   Patient Care Overview   Progress improving   Outcome Evaluation   Outcome Summary/Follow up Plan VSS. confused- trying to get OOB without staff. up to chair most of shift. voiding per BRP without difficulty. TLSO brace on. Tramadol given for pain with adequate pain relief. medically stable for discharge home with [facility] and family support.          Problem: Fall Risk (Adult)  Goal: Identify Related Risk Factors and Signs and Symptoms  Outcome: Outcome(s) achieved Date Met:  04/14/17  Goal: Absence of Falls  Outcome: Ongoing (interventions implemented as appropriate)    Problem: Self-Care Deficit (Adult,Obstetrics,Pediatric)  Goal: Identify Related Risk Factors and Signs and Symptoms  Outcome: Outcome(s) achieved Date Met:  04/14/17  Goal: Improved Ability to Perform BADL and IADL  Outcome: Ongoing (interventions implemented as appropriate)    Problem: Orthopaedic Fracture (Adult)  Goal: Signs and Symptoms of Listed Potential Problems Will be Absent or Manageable (Orthopaedic Fracture)  Outcome: Ongoing (interventions implemented as appropriate)

## 2017-04-14 NOTE — DISCHARGE SUMMARY
Date of Admission: 4/12/2017  Date of Discharge:  4/14/2017  Primary Care Physician: Goldie Madera MD     Discharge Diagnosis:  Principal Problem:    Thoracic compression fracture  Active Problems:    Anxiety disorder    Chronic kidney disease, stage III (moderate)    Mild cognitive impairment    Osteoporosis    Impaired glucose tolerance    Pulmonary interstitial fibrosis    Multiple drug allergies      DETAILS OF HOSPITAL STAY     Pertinent Test Results and Procedures Performed    MRI of the thoracic spine:  1.  T8 compression fracture, 60% loss of vertebral body height.  2.  0.25 cm retropulsed fracture fragment at the inferior endplate of  T8, mild spinal canal stenosis.   3.  Mild to moderate spondylosis of the thoracic spine.    MRI of the lumbar spine;  Multifactorial multilevel spondylosis of the lumbar spine, most  prominent changes are seen at L1-2 and L2-3, milder changes are seen at  other levels. No acute fracture or subluxation.    Hospital Course    This is an 87-year-old white female who presented to the hospital with complaints of back pain.  She was found to have a T8 compression fracture.  She was admitted and evaluated by neurosurgery.  She was deemed appropriate for nonoperative treatment.  She was placed in a TLSO brace and provided pain medication.  Her pain is well-controlled.  She has been up this morning with the brace and is tolerating this well.  She will follow-up with neurosurgery in one month.  She will return to her assisted living facility and have home health for occupational and physical therapy.  Her other chronic medical conditions were stable throughout her hospitalization.  She will be discharged today.    Consults:   Consults     Date and Time Order Name Status Description    4/12/2017 1624 Inpatient Consult to Neurosurgery Completed             Condition on Discharge: Stable    Discharge Disposition  Home or Self Care    Discharge Medications   Katharine Mitchell   [place]  Medication Instructions [facility]:[id number]    Printed on:04/14/17 1042   Medication Information                      Acetaminophen (TYLENOL) 325 MG capsule  Take 325 mg by mouth 4 (Four) Times a Day. May take 2 if needed             aspirin (ECOTRIN LOW STRENGTH) 81 MG EC tablet  Take 81 mg by mouth daily.             Calcium Carb-Cholecalciferol (CALCIUM 600 + D PO)  Take  by mouth daily.             Cholecalciferol (VITAMIN D-3) 1000 UNITS capsule  Take  by mouth.             lidocaine (LIDODERM) 5 %  Place 1 patch on the skin Daily. Remove & Discard patch within 12 hours or as directed by MD             nitroglycerin (NITROSTAT) 0.4 MG SL tablet               raNITIdine (ZANTAC) 150 MG tablet  Take 1 tablet by mouth 2 (Two) Times a Day.             rivastigmine (EXELON) 4.6 MG/24HR patch  ROSSANA 1 PA EXT TO THE SKIN D             traMADol (ULTRAM) 50 MG tablet  Take 1 tablet by mouth Every 8 (Eight) Hours As Needed for Moderate Pain (4-6).             vitamin B-12 (CYANOCOBALAMIN) 1000 MCG tablet  Place  under the tongue.                 Discharge Diet:   Diet Instructions     Diet: Regular; Thin Liquids, No Restrictions       Discharge Diet:  Regular   Fluid Consistency:  Thin Liquids, No Restrictions                 Activity at Discharge:   Activity Instructions     Activity as Tolerated                     Follow-up Appointments  Future Appointments  Date Time Provider Department Center   5/1/2017 2:00 PM Nisa Hickey MD [facility] None     Additional Instructions for the Follow-ups that You Need to Schedule     Discharge Follow-Up With Specified Provider    As directed    To:  Neurosurgery   Follow Up:  1 Month       Discharge Follow-up with PCP    As directed    Follow Up Details:  In 1 week                   I have examined and discussed discharge planning with the patient today.     Rodri Meng MD  04/14/17  10:43 AM    Time: Discharge less than 30 min

## 2017-04-17 PROBLEM — R79.89 ABNORMAL LFTS: Status: ACTIVE | Noted: 2017-01-01

## 2017-04-17 PROBLEM — S61.012A LACERATION OF LEFT THUMB: Status: ACTIVE | Noted: 2017-01-01

## 2017-04-17 PROBLEM — F03.90 DEMENTIA WITHOUT BEHAVIORAL DISTURBANCE (HCC): Status: ACTIVE | Noted: 2017-01-01

## 2017-04-17 PROBLEM — S32.599A FRACTURE OF MULTIPLE PUBIC RAMI (HCC): Status: ACTIVE | Noted: 2017-01-01

## 2017-04-17 PROBLEM — D72.829 LEUKOCYTOSIS: Status: ACTIVE | Noted: 2017-01-01

## 2017-04-17 PROBLEM — M25.552 HIP PAIN, LEFT: Status: ACTIVE | Noted: 2017-01-01

## 2017-04-17 NOTE — H&P
HISTORY AND PHYSICAL   [facility]        Patient Identification:  Name: Katharine Mitchell  Age: 87 y.o.  Sex: female  :  1929  MRN: [id number]                     Primary Care Physician: Goldie Madera MD    Chief Complaint:  Left hip pain     History of Present Illness:   Mrs Mitchell is an 87-year-old female who suffers from dementia with out behavior disorder.  She was just discharged by my colleague Dr. Meng on 2017 for a thoracic vertebral compression fracture of which medical management was endorsed per neurosurgery.  She was given a TLSO brace and discharged back to her assisted living facility.  She claims she slipped out of bed and onto her bottom and ultimately suffered fractures to her left inferior and superior pubic rami.  She denies any syncope or preceding chest pain.  She had some acute pain in the ER and tolerated IV morphine well though she seems to have previous and extensive drug allergies.  She also suffered a small little 2 cm laceration to her right thumb but the thumb itself appears to have retained function and movement and is currently being sutured by nurse practitioner at bedside.  Incidentally on repeat lab work its appears that this patient's white blood cell count as well as liver function have elevated considerably just over the last couple days.  She denies any nausea vomiting or abdominal pain.  She denies any fever chills or night sweats.  Initial workup involving chest x-ray and urinalysis have been unremarkable and both blood culture ×1 and urine culture are pending.    Past Medical History:  Past Medical History:   Diagnosis Date   • Abdominal pain, LLQ (left lower quadrant)    • Abdominal pain, RLQ    • Abnormal kidney function study 2015    PT myra bumped up in Feb. Had a contrasted CT in  which amy contributed. I had spoken with her GYN who had been prescribing Boniva when he contacted me with her labs. I recommended holding  off giving her low creatinine clearance. He was supposed to discuss this with her.   • Abnormal TSH 05/04/2015    Recheck today.   • Anxiety     controlled sub agreement signed 6/16/14   • Aortic regurgitation    • Arthritis of hand, right    • Atrophy of right kidney    • Change in bowel movement    • CKD (chronic kidney disease) stage 3, GFR 30-59 ml/min    • Diverticulosis of large intestine     extensive   • Dizziness    • Fatigue    • GERD (gastroesophageal reflux disease)    • H/O colonoscopy     Complete (Fiberoptic)   • Hip pain, right    • Long Q-T syndrome    • Memory changes    • Osteoporosis     This has been managed by Dr. Grimes.  Her last DEXA 11/5/2012.  She had been on IV Boniva for quite a long time.   • Personal history of coronary atherosclerosis    • Personal history of urinary tract infection 06/16/2014    Repeat urine today.   • Pneumonia    • Prediabetes    • Shoulder pain, left    • Sinus bradycardia    • Vertigo 11/03/2014    Refill Meclizine for use as needed.   • Vitamin B12 deficiency      Past Surgical History:  Past Surgical History:   Procedure Laterality Date   • BREAST SURGERY      Biopsy, Open   • CARDIAC CATHETERIZATION     • CHOLECYSTECTOMY     • EYE SURGERY      Cataract      Home Meds:    (Not in a hospital admission)    Allergies:  Allergies   Allergen Reactions   • Alendronate    • Amlodipine    • Aspirin    • Axid [Nizatidine]    • Azithromycin    • Codeine    • Donepezil      Upset stomach   • Estrogens    • Fentanyl    • Fluticasone    • Hydrocodone-Acetaminophen    • Levofloxacin    • Naproxen    • Niacin    • Nsaids    • Omeprazole    • Other    • Oxycodone    • Penicillins    • Prednisone    • Proton Pump Inhibitors    • Rabeprazole    • Raloxifene    • Risedronate Sodium    • Sulfa Antibiotics    • Tetracyclines & Related      Immunizations:  Immunization History   Administered Date(s) Administered   • Influenza Split High Dose Preservative Free IM 10/20/2016   •  "Influenza, Quadrivalent 10/07/2015   • Pneumococcal Conjugate 10/01/1997     Social History:   Social History     Social History Narrative     Social History     Social History   • Marital status:      Spouse name: N/A   • Number of children: N/A   • Years of education: N/A     Occupational History   • Not on file.     Social History Main Topics   • Smoking status: Never Smoker   • Smokeless tobacco: Not on file   • Alcohol use Yes      Comment: social    • Drug use: No   • Sexual activity: Defer     Other Topics Concern   • Not on file     Social History Narrative       Family History:  Family History   Problem Relation Age of Onset   • Dementia Mother    • Heart attack Father      Acute Myocardial Infarction   • Coronary artery disease Father    • Hypertension Father         Review of Systems  See history of present illness and past medical history.  Patient denies any acute changes to vision smell taste or sound.  Denies any head trauma or headache or neck pain.  Denies any dysphasia chest pain palpitations cough or shortness of breath.  Denies abdominal pain dysuria nausea vomiting or changes to her bowel regimen.  Admits to left-sided hip pain as well as previous issues with back pain.  Prior to the fall denied any issues with bruising bleeding.  Denies any complete loss of function.  Remainder of ROS is negative.    Objective:  tMax 24 hrs: Temp (24hrs), Av.9 °F (36.6 °C), Min:97.6 °F (36.4 °C), Max:98.2 °F (36.8 °C)    Vitals Ranges:   Temp:  [97.6 °F (36.4 °C)-98.2 °F (36.8 °C)] 98.2 °F (36.8 °C)  Heart Rate:  [69-99] 81  Resp:  [15-18] 18  BP: (102-137)/(58-74) 130/63      Exam:  /63  Pulse 81  Temp 98.2 °F (36.8 °C) (Tympanic)   Resp 18  Ht 66\" (167.6 cm)  Wt 107 lb (48.5 kg)  SpO2 100%  BMI 17.27 kg/m2    General Appearance:    Alert, cooperative, no distress, demented but quite animated   Head:    Normocephalic, without obvious abnormality, atraumatic   Eyes:    PERRL, " conjunctiva/corneas clear, EOM's intact, both eyes   Ears:    Normal external ear canals, both ears   Nose:   Nares normal, septum midline, mucosa normal, no drainage    or sinus tenderness   Throat:   Lips, mucosa, and tongue normal   Neck:   Supple, symmetrical, trachea midline, no ttp or JVD       Lungs:     Clear to auscultation bilaterally, respirations unlabored   Chest Wall:    No tenderness or deformity    Heart:    Regular rate and rhythm, S1 and S2 normal   Abdomen:     Soft, non-tender, bowel sounds active all four quadrants,     no masses, no hepatomegaly, no splenomegaly   Extremities:   R thumb lac w/ preserved function, Left hip pain w/ flexion to ~30degrees, no cyanosis or edema   Pulses:   2+ and symmetric all extremities           Neurologic:   CNII-XII intact, no focal deficits      .    Data Review:  Labs in chart were reviewed.             Imaging Results (all)     Procedure Component Value Units Date/Time    XR Hip With or Without Pelvis 2 - 3 View Left [[id number]] Collected:  04/17/17 1018     Updated:  04/17/17 1022    Narrative:       XR HIP W OR WO PELVIS 2-3 VIEW LEFT-     Clinical: Fell with left hip pain     COMPARISON 09/26/2014     FINDINGS: Fracture through the superior and inferior pubic rami.  Superior rami fracture, comminuted and extends to the pubis. The left  hip joint itself is satisfactory in appearance without fracture nor  dislocation. There is bilateral hip joint narrowing similar to the prior  examination. Soft tissues have a satisfactory appearance.     CONCLUSION: Superior and inferior left pubic rami fractures.     This report was finalized on 4/17/2017 10:19 AM by Dr. José Luis Platt MD.       XR Chest 1 View [[id number]] Collected:  04/17/17 1108     Updated:  04/17/17 1112    Narrative:       XR CHEST 1 VW-     Clinical: Fell, elevated white count, atrial fibrillation     COMPARISON made to all prior chest radiograph dating to 03/22/2010     FINDINGS: There is an  abnormal interstitial pattern consistent with  fibrosis. Low lung volumes on the current examination causing crowding  of the bronchovascular markings, no acute consolidation is demonstrated.  Stable heart size, heart size upper limits of normal to mildly enlarged.  There is atherosclerotic calcification of the aorta. There is prominence  of the right hilum which may simply be related to low lung volumes and  rotation of factors. Short-term follow-up advised. The remainder is  unremarkable.     This report was finalized on 4/17/2017 11:09 AM by Dr. José Luis Platt MD.       CT Abdomen Pelvis Without Contrast [[id number]] Collected:  04/17/17 1236     Updated:  04/17/17 1236    Narrative:       CT ABDOMEN PELVIS WITHOUT CONTRAST     HISTORY: Elevated LFTs, back pain     TECHNIQUE: Spiral axial CT imaging of the abdomen and pelvis was  performed at 3 mm intervals throughout. No intravenous contrast was  administered for this imaging.     COMPARISON: Correlation is made with contrasted CT imaging of the  abdomen pelvis 01/17/2017 and 01/21/2015.     FINDINGS: The hepatic parenchyma demonstrates an unremarkable  noncontrasted appearance. There has been prior cholecystectomy. There is  fairly extensive biliary ductal ectasia, present on prior exams. Spleen  is normal in size and configuration. The pancreas, adrenal glands, and  kidneys demonstrate unremarkable noncontrasted appearance. There is no  hydronephrosis or urolithiasis present.     There is advanced diverticulosis, particularly along the sigmoid. There  is no acute inflammatory change identified. A normal appendix is  present. Small bowel is normal in caliber and course. Stomach is  decompressed. There is a small to moderate hiatal hernia present. Uterus  is atrophic. No adnexal masses are present. Bladder appears  unremarkable. No mass or adenopathy is identified. There is no free air  or free fluid. Degenerative changes are present at the hips and spine.      There is now noted a mildly displaced and impacted fracture through the  left sacral ala which is new compared with January of this year. There  are also new fractures of the left superior and inferior pubic rami with  some mild associated hematoma in the adjacent fat planes. Visualized  lung bases demonstrate chronic interstitial change and atelectasis.  There is a small left pleural effusion.       Impression:       1. There are acute appearing fractures left superior and inferior pubic  rami with small amounts of hematoma in the adjacent fat planes. There is  also a comminuted and somewhat impacted fracture of the left sacral ala.  No visceral injury is identified.  2. There is long-standing intrahepatic and extra hepatic biliary ductal  dilatation, similar to previous imaging. This may simply reflect biliary  ectasia status post cholecystectomy although noncalcified  choledocholithiasis cannot be assessed. An MRCP could be attempted for  further assessment if indicated.               Assessment:  Principal Problem:    Fracture of multiple pubic rami  Active Problems:    Chronic kidney disease, stage III (moderate)    Thoracic compression fracture    Hip pain, left    Leukocytosis    Dementia without behavioral disturbance    Laceration of left thumb    Abnormal LFTs      Plan:  Pelvic fractures are nonoperable - PT to eval in am WBAT once son brings TLSO brace for previous VCF   -Tramadol w/ IV MS only for severe breakthrough    -healing will be compounded by Osteoporosis    Leukocytosis - likely reactive - repeat in am w/ PCT - BC pending - consider Heme consult if still elevated    ?UTI - Cx pending - no abx given and will hold for now since UA more c/w traumatic cath    SLIVF    Abnormal LFTs - chronic changes on CT - hold on MRCP as no abdominal pains/n/v and is o/w asymptomatic   -repeat CMP in am w/ hep panel   -?if made worse by Tylenol     SCDs for DVT prophylaxis    CCP - needs TOO Juárez  MD Alex  4/17/2017  3:11 PM

## 2017-04-17 NOTE — PLAN OF CARE
Problem: Fall Risk (Adult)  Goal: Identify Related Risk Factors and Signs and Symptoms  Outcome: Outcome(s) achieved Date Met:  04/17/17  Goal: Absence of Falls  Outcome: Ongoing (interventions implemented as appropriate)    Problem: Activity Intolerance (Adult)  Goal: Identify Related Risk Factors and Signs and Symptoms  Outcome: Outcome(s) achieved Date Met:  04/17/17  Goal: Activity Tolerance  Outcome: Ongoing (interventions implemented as appropriate)  Goal: Effective Energy Conservation Techniques  Outcome: Ongoing (interventions implemented as appropriate)    Problem: Patient Care Overview (Adult)  Goal: Plan of Care Review  Outcome: Ongoing (interventions implemented as appropriate)    04/17/17 1459   Patient Care Overview   Progress no change   Outcome Evaluation   Outcome Summary/Follow up Plan new admission       Goal: Adult Individualization and Mutuality  Outcome: Ongoing (interventions implemented as appropriate)  Goal: Discharge Needs Assessment  Outcome: Ongoing (interventions implemented as appropriate)    Problem: Fractured Hip (Adult)  Goal: Signs and Symptoms of Listed Potential Problems Will be Absent or Manageable (Fractured Hip)  Outcome: Ongoing (interventions implemented as appropriate)

## 2017-04-17 NOTE — ED PROVIDER NOTES
" EMERGENCY DEPARTMENT ENCOUNTER    CHIEF COMPLAINT  Chief Complaint: fall  History given by: patient  History limited by: poor historian  Room Number: 537/1  PMD: Goldie Madera MD      HPI:  Pt is a 87 y.o. female who presents complaining of left hip pain s/p fall while getting out of bed to use the restroom. Pt is unsure the exact mechanics of the fall. Pt denies HA, numbness or tingling and is unsure if she hit her head. Per NH, the pt was found down by NH staff. Pt ambulates with a walker at baseline.    Duration:  unknown  Onset: unknown  Timing: constant  Location: left hip  Radiation: none  Quality: \"pain\"  Intensity/Severity: moderate  Progression: unspecified  Associated Symptoms: none mentioned  Aggravating Factors: palpation, movement  Alleviating Factors: none  Previous Episodes: none  Treatment before arrival: none    PAST MEDICAL HISTORY  Active Ambulatory Problems     Diagnosis Date Noted   • Left lower quadrant pain 01/20/2016   • Right lower quadrant abdominal pain 01/20/2016   • Anxiety disorder 01/20/2016   • Aortic valve insufficiency 01/20/2016   • Arthritis of hand 01/20/2016   • Renal atrophy 01/20/2016   • Altered bowel function 01/20/2016   • Chronic kidney disease, stage III (moderate) 01/20/2016   • Dizziness 01/20/2016   • Fatigue 01/20/2016   • Mild cognitive impairment 01/20/2016   • Osteoporosis 01/20/2016   • Impaired glucose tolerance 01/20/2016   • Arthralgia of hip 01/20/2016   • Shoulder pain 01/20/2016   • Sinus bradycardia 01/20/2016   • Spasm 01/20/2016   • Cobalamin deficiency 01/20/2016   • Chronic interstitial lung disease 03/07/2016   • Abnormality of lung on CXR 03/07/2016   • Pulmonary interstitial fibrosis 06/29/2016   • Diverticulosis of large intestine 06/29/2016   • Neck pain 07/14/2016   • Pharyngeal dysphagia 08/04/2016   • Cervicogenic headache 09/15/2016   • Gait instability 11/22/2016   • Calcification of both carotid arteries 01/19/2017   • Vertebral artery " disease 01/19/2017   • Thoracic compression fracture 04/12/2017   • Multiple drug allergies 04/12/2017     Resolved Ambulatory Problems     Diagnosis Date Noted   • Cough 03/03/2016     Past Medical History:   Diagnosis Date   • Abdominal pain, LLQ (left lower quadrant)    • Abdominal pain, RLQ    • Abnormal kidney function study 05/04/2015   • Abnormal TSH 05/04/2015   • Anxiety    • Aortic regurgitation    • Arthritis of hand, right    • Atrophy of right kidney    • Change in bowel movement    • CKD (chronic kidney disease) stage 3, GFR 30-59 ml/min    • Diverticulosis of large intestine    • Dizziness    • Fatigue    • GERD (gastroesophageal reflux disease)    • H/O colonoscopy    • Hip pain, right    • Long Q-T syndrome    • Memory changes    • Osteoporosis    • Personal history of coronary atherosclerosis    • Personal history of urinary tract infection 06/16/2014   • Pneumonia    • Prediabetes    • Shoulder pain, left    • Sinus bradycardia    • Vertigo 11/03/2014   • Vitamin B12 deficiency        PAST SURGICAL HISTORY  Past Surgical History:   Procedure Laterality Date   • BREAST SURGERY      Biopsy, Open   • CARDIAC CATHETERIZATION     • CHOLECYSTECTOMY     • EYE SURGERY      Cataract       FAMILY HISTORY  Family History   Problem Relation Age of Onset   • Dementia Mother    • Heart attack Father      Acute Myocardial Infarction   • Coronary artery disease Father    • Hypertension Father        SOCIAL HISTORY  Social History     Social History   • Marital status:      Spouse name: N/A   • Number of children: N/A   • Years of education: N/A     Occupational History   • Not on file.     Social History Main Topics   • Smoking status: Never Smoker   • Smokeless tobacco: Not on file   • Alcohol use Yes      Comment: social    • Drug use: No   • Sexual activity: Defer     Other Topics Concern   • Not on file     Social History Narrative       ALLERGIES  Alendronate; Amlodipine; Aspirin; Axid [nizatidine];  Azithromycin; Codeine; Donepezil; Estrogens; Fentanyl; Fluticasone; Hydrocodone-acetaminophen; Levofloxacin; Naproxen; Niacin; Nsaids; Omeprazole; Other; Oxycodone; Penicillins; Prednisone; Proton pump inhibitors; Rabeprazole; Raloxifene; Risedronate sodium; Sulfa antibiotics; and Tetracyclines & related    REVIEW OF SYSTEMS  Review of Systems   Constitutional: Negative for fever.   HENT: Negative for sore throat.    Eyes: Negative.    Respiratory: Negative for cough and shortness of breath.    Cardiovascular: Negative for chest pain.   Gastrointestinal: Negative for abdominal pain, diarrhea and vomiting.   Genitourinary: Negative for dysuria.   Musculoskeletal: Positive for arthralgias (L hip). Negative for neck pain.   Skin: Negative for rash.   Allergic/Immunologic: Negative.    Neurological: Negative for weakness, numbness and headaches.   Hematological: Negative.    Psychiatric/Behavioral: Negative.    All other systems reviewed and are negative.      PHYSICAL EXAM  ED Triage Vitals   Temp Heart Rate Resp BP SpO2   04/17/17 0835 04/17/17 0833 04/17/17 0833 04/17/17 0833 04/17/17 0833   97.6 °F (36.4 °C) 99 18 121/74 97 %      Temp src Heart Rate Source Patient Position BP Location FiO2 (%)   04/17/17 0835 -- -- -- --   Tympanic           Physical Exam   Constitutional: She is well-developed, well-nourished, and in no distress. No distress.   HENT:   Head: Normocephalic and atraumatic.   Eyes: EOM are normal. Pupils are equal, round, and reactive to light.   Neck: Normal range of motion. Neck supple.   Cardiovascular: Normal rate, regular rhythm, normal heart sounds and intact distal pulses.    Pulmonary/Chest: Effort normal and breath sounds normal. No respiratory distress.   Abdominal: Soft. There is no tenderness. There is no rebound and no guarding.   Musculoskeletal: Normal range of motion. She exhibits no edema.        Left hip: She exhibits tenderness.        Cervical back: She exhibits no tenderness.         Lumbar back: She exhibits no tenderness.   Pain with external and internal rotation of the left leg. Left leg is shortened   Neurological: She is alert. She has normal sensation and normal strength.   Skin: Skin is warm and dry. Laceration (2cm laceration to the palmar aspect of the base of the right thumb) noted. No rash noted.   Skin tear to left elbow   Psychiatric: Mood and affect normal.   Nursing note and vitals reviewed.      LAB RESULTS  Lab Results (last 24 hours)     Procedure Component Value Units Date/Time    CBC & Differential [[id number]] Collected:  04/17/17 0925    Specimen:  Blood Updated:  04/17/17 1029    Narrative:       The following orders were created for panel order CBC & Differential.  Procedure                               Abnormality         Status                     ---------                               -----------         ------                     Scan Slide[[id number]]                    Normal              Final result               CBC Auto Differential[[id number]]         Abnormal            Final result                 Please view results for these tests on the individual orders.    Comprehensive Metabolic Panel [[id number]]  (Abnormal) Collected:  04/17/17 0925    Specimen:  Blood Updated:  04/17/17 0956     Glucose 127 (H) mg/dL      BUN 25 (H) mg/dL      Creatinine 1.21 (H) mg/dL      Sodium 137 mmol/L      Potassium 4.4 mmol/L      Chloride 96 (L) mmol/L      CO2 29.0 mmol/L      Calcium 9.3 mg/dL      Total Protein 7.3 g/dL      Albumin 3.10 (L) g/dL      ALT (SGPT) 484 (H) U/L      AST (SGOT) 543 (H) U/L      Alkaline Phosphatase 520 (H) U/L      Total Bilirubin 1.4 (H) mg/dL      eGFR Non African Amer 42 (L) mL/min/1.73      Globulin 4.2 gm/dL      A/G Ratio 0.7 g/dL      BUN/Creatinine Ratio 20.7     Anion Gap 12.0 mmol/L     Narrative:       The MDRD GFR formula is only valid for adults with stable renal function between ages 18 and 70.    Protime-INR [[id number]]  (Normal)  Collected:  04/17/17 0925    Specimen:  Blood Updated:  04/17/17 0946     Protime 13.1 Seconds      INR 1.03    aPTT [[id number]]  (Normal) Collected:  04/17/17 0925    Specimen:  Blood Updated:  04/17/17 0946     PTT 30.9 seconds     CBC Auto Differential [[id number]]  (Abnormal) Collected:  04/17/17 0925    Specimen:  Blood Updated:  04/17/17 1029     WBC 35.79 (C) 10*3/mm3      RBC 3.65 (L) 10*6/mm3      Hemoglobin 11.4 (L) g/dL      Hematocrit 34.9 (L) %      MCV 95.6 fL      MCH 31.2 pg      MCHC 32.7 g/dL      RDW 13.1 (H) %      RDW-SD 46.0 fl      MPV 10.0 fL      Platelets 227 10*3/mm3      Neutrophil % 90.9 (H) %      Lymphocyte % 2.0 (L) %      Monocyte % 5.7 %      Eosinophil % 0.0 (L) %      Basophil % 0.1 %      Immature Grans % 1.3 (H) %      Neutrophils, Absolute 32.54 (H) 10*3/mm3      Lymphocytes, Absolute 0.72 (L) 10*3/mm3      Monocytes, Absolute 2.03 (H) 10*3/mm3      Eosinophils, Absolute 0.01 10*3/mm3      Basophils, Absolute 0.04 10*3/mm3      Immature Grans, Absolute 0.45 (H) 10*3/mm3      nRBC 0.0 /100 WBC     Scan Slide [[id number]]  (Normal) Collected:  04/17/17 0925    Specimen:  Blood Updated:  04/17/17 1029     RBC Morphology Normal     WBC Morphology Normal     Platelet Morphology Normal    Urinalysis With / Culture If Indicated [[id number]]  (Abnormal) Collected:  04/17/17 1107    Specimen:  Urine from Urine, Catheter Updated:  04/17/17 1119     Color, UA Dark Yellow (A)     Appearance, UA Cloudy (A)     pH, UA 6.0     Specific Gravity, UA 1.019     Glucose, UA Negative     Ketones, UA Negative     Bilirubin, UA Negative     Blood, UA Large (3+) (A)     Protein, UA 30 mg/dL (1+) (A)     Leuk Esterase, UA Small (1+) (A)     Nitrite, UA Negative     Urobilinogen, UA 1.0 E.U./dL    Urinalysis, Microscopic Only [[id number]]  (Abnormal) Collected:  04/17/17 1107    Specimen:  Urine from Urine, Catheter Updated:  04/17/17 1131     RBC, UA Too Numerous to Count (A) /HPF      WBC, UA 0-2 /HPF       Bacteria, UA None Seen /HPF      Squamous Epithelial Cells, UA None Seen /HPF      Hyaline Casts, UA 0-2 /LPF      Methodology Manual Light Microscopy    Urine Culture [[id number]] Collected:  04/17/17 1107    Specimen:  Urine from Urine, Catheter Updated:  04/17/17 1117    Blood Culture [[id number]] Collected:  04/17/17 1220    Specimen:  Blood from Arm, Left Updated:  04/17/17 1223    Blood Culture [[id number]] Collected:  04/17/17 1457    Specimen:  Blood from Arm, Left Updated:  04/17/17 1504          I ordered the above labs and reviewed the results    RADIOLOGY  CT Abdomen Pelvis Without Contrast   Preliminary Result   1. There are acute appearing fractures left superior and inferior pubic   rami with small amounts of hematoma in the adjacent fat planes. There is   also a comminuted and somewhat impacted fracture of the left sacral ala.   No visceral injury is identified.   2. There is long-standing intrahepatic and extra hepatic biliary ductal   dilatation, similar to previous imaging. This may simply reflect biliary   ectasia status post cholecystectomy although noncalcified   choledocholithiasis cannot be assessed. An MRCP could be attempted for   further assessment if indicated.          XR Chest 1 View   Final Result      XR Hip With or Without Pelvis 2 - 3 View Left   Final Result         1053- Reviewed pt's L hip XR, which shows left superior and inferior pubic rami fractures. Independently viewed by me. Interpreted by radiologist.     1119- Reviewed pt's CXR, which shows chronic changes c/w fibrosis but nothing acute. Independently viewed by me. Interpreted by radiologist.     1218- Reviewed pt's CT Abd/Pelvis, which shows left pelvic fractures and left sacral ala fracture. Previous cholecystectomy and chronic, unchanged biliary duct diliation. Independently viewed by me. Interpreted by radiologist. Discussed with Dr. Andrade.      I ordered the above noted radiological studies. Interpreted by radiologist.  Reviewed by me in PACS.       PROCEDURES  Procedures      PROGRESS AND CONSULTS  ED Course   Value Comment By Time   WBC: (!!) 35.79 Normal 3 days ago Prabhjot Fink MD 04/17 1038   ALT (SGPT): (!) 484 LFTs have increased since 5 days ago Prabhjot Fink MD 04/17 1039     0921- Ordered blood work, PT with INR, PTT and L hip XR for further evaluation. Ordered morphine and zofran for pain.    1022- Ordered CXR for further evaluation.    1025- Rechecked pt. Pt continues to deny cough, SOB or abd pain. Notified pt of her elevated WBC count and the plan to order a CXR and UA for further evaluation.     1029- Ordered UA for further evaluation.    11:20 AM  Latest vital signs   BP- 109/59 HR- 79 Temp- 97.6 °F (36.4 °C) (Tympanic) O2 sat- 100%    1120- Ordered blood cultures for further evaluation.     1135- Placed call to [facility] for admission. Ordered CT Abd/Pelvis for further evaluation.    1142- Discussed the pt's case with Dr. Johnson ([facility]) who agrees to admit the pt to a telemetry bed.    1144- Ordered IVF for hydration.     1216- Rechecked pt. Pt is resting comfortably and son is at bedside. Notified pt's son of the pt's lab and imaging results, including the pt's pelvic fractures, elevated WBC count and elevated LFTs. Discussed the plan to admit the pt for further evaluation of the pt's symptoms. Pt and family agree with the plan and all questions were addressed.      MEDICAL DECISION MAKING  Results were reviewed/discussed with the patient and they were also made aware of online access. Pt also made aware that some labs, such as cultures, will not be resulted during ER visit and follow up with PMD is necessary.     MDM  Number of Diagnoses or Management Options  Closed fracture of sacrum, unspecified portion of sacrum, initial encounter:   Elevated LFTs:   Fall, initial encounter:   Fracture of multiple pubic rami, left, closed, initial encounter:   Hand laceration, right, initial encounter:   Leukocytosis,  unspecified type:   Diagnosis management comments: Patient's x-rays showed fractures of the left inferior and superior pelvic rami.  There was no hip fracture.  Patient had an elevated white blood cell count.  The etiology of this was unclear as her chest x-ray was negative and her urinalysis was normal.  Patient did have elevation of her LFTs.  She denied any abdominal pain in her abdomen was nontender on exam.  CT the abdomen and pelvis was performed and did not show any acute abnormalities.  The patient was treated with IV fluids, IV Zofran, and IV morphine.  She did have a small laceration on her right hand which was repaired by the NP.  Case was discussed with Dr. Johnson and he will admit the patient.  Blood cultures were obtained.       Amount and/or Complexity of Data Reviewed  Clinical lab tests: ordered and reviewed (WBC=35.79, ALT=484, FQU=094)  Tests in the radiology section of CPT®: ordered and reviewed (L hip XR shows left superior and inferior pubic rami fractures. )  Decide to obtain previous medical records or to obtain history from someone other than the patient: yes  Review and summarize past medical records: yes (Pt was admitted from 4-12-17 until 4-14-17 for thoracic spine compression fracture. Pt's MRI T-spine showed  T8 compression fracture, 60% loss of vertebral body height with 0.25 cm retropulsed fracture fragment at the inferior endplate and spinal canal stenosis. Pt was treated with a back brace. )  Discuss the patient with other providers: yes (D/w Dr. Johnson ([facility]))  Independent visualization of images, tracings, or specimens: yes           DIAGNOSIS  Final diagnoses:   Fracture of multiple pubic rami, left, closed, initial encounter   Leukocytosis, unspecified type   Elevated LFTs   Fall, initial encounter   Closed fracture of sacrum, unspecified portion of sacrum, initial encounter   Hand laceration, right, initial encounter       DISPOSITION  ADMISSION    Discussed treatment plan and  reason for admission with pt/family and admitting physician.  Pt/family voiced understanding of the plan for admission for further testing/treatment as needed.     Latest Documented Vital Signs:  As of 5:45 PM  BP- (P) 165/89 HR- (P) 78 Temp- (P) 97.1 °F (36.2 °C) (Oral) O2 sat- (P) 100%    --  Documentation assistance provided by seda Thurman for Dr. Fink.  Information recorded by the seda was done at my direction and has been verified and validated by me.     Snehal Thurman  04/17/17 [id number]       Prabhjot Fink MD  04/17/17 0448

## 2017-04-17 NOTE — PLAN OF CARE
Problem: Fall Risk (Adult)  Goal: Identify Related Risk Factors and Signs and Symptoms  Outcome: Ongoing (interventions implemented as appropriate)  Goal: Absence of Falls  Outcome: Ongoing (interventions implemented as appropriate)    Problem: Activity Intolerance (Adult)  Goal: Identify Related Risk Factors and Signs and Symptoms  Outcome: Ongoing (interventions implemented as appropriate)  Goal: Activity Tolerance  Outcome: Ongoing (interventions implemented as appropriate)  Goal: Effective Energy Conservation Techniques  Outcome: Ongoing (interventions implemented as appropriate)

## 2017-04-17 NOTE — NURSING NOTE
Pt F/C inserted without issue. Pt. Has excessive pain due to fractures of the pelvis. Also upon trying to void she was unable. Immediately patient had sukh urine return 300 ml.

## 2017-04-17 NOTE — ED NOTES
"Talked with nurse from [facility] and updated her on pt status. Pt's son also notified of admission and room number. Pt has had an onset of increased confusion since pain medication administration, but MD discussed the benefit of pain management over the feeling of being \"doped up,\"- per pt, and pt agrees with pain medication administreation     Trinidad Tinsley RN  04/17/17 9463    "

## 2017-04-17 NOTE — ED PROVIDER NOTES
Laceration Repair  Date/Time: 4/17/2017 2:09 PM  Performed by: ZULMA LENNON  Authorized by: PRICILA BLEDSOE   Consent: Verbal consent obtained.  Risks and benefits: risks, benefits and alternatives were discussed  Consent given by: patient  Patient understanding: patient states understanding of the procedure being performed  Patient consent: the patient's understanding of the procedure matches consent given  Required items: required blood products, implants, devices, and special equipment available  Patient identity confirmed: verbally with patient and arm band  Body area: upper extremity (palmar aspect of the base of the right thumb)  Laceration length: 2 cm  Foreign bodies: no foreign bodies  Tendon involvement: none  Nerve involvement: none  Vascular damage: no  Anesthesia: local infiltration    Anesthesia:  Anesthesia: local infiltration  Local Anesthetic: lidocaine 1% without epinephrine   Anesthetic total: 4 mL  Sedation:  Patient sedated: no    Preparation: Patient was prepped and draped in the usual sterile fashion.  Irrigation solution: saline  Irrigation method: syringe  Amount of cleaning: standard  Debridement: none  Degree of undermining: none  Skin closure: 5-0 nylon  Number of sutures: 5  Technique: simple (interrupted)  Approximation: close  Approximation difficulty: simple  Dressing: antibiotic ointment (kerlix gauze)  Patient tolerance: Patient tolerated the procedure well with no immediate complications        Documentation assistance provided by seda Cadet for BELGICA Lennon. Information recorded by the scribe was done at my direction and has been verified and validated by me.        Joi Cadet  04/17/17 1426       RICHY Salomon  04/17/17 1459

## 2017-04-17 NOTE — ED TRIAGE NOTES
Found down by staff at [facility]. Complain of Left hip pain/discomfort.  [facility]

## 2017-04-18 NOTE — PROGRESS NOTES
Acute Care - Physical Therapy Initial Evaluation  [facility]     Patient Name: Katharine Mitchell  : 1929  MRN: [id number]  Today's Date: 2017   Onset of Illness/Injury or Date of Surgery Date: 17  Date of Referral to PT: 17  Referring Physician: Alex      Admit Date: 2017     Visit Dx:    ICD-10-CM ICD-9-CM   1. Fracture of multiple pubic rami, left, closed, initial encounter S32.592A 808.2   2. Leukocytosis, unspecified type D72.829 288.60   3. Elevated LFTs R79.89 790.6   4. Fall, initial encounter W19.XXXA E888.9   5. Closed fracture of sacrum, unspecified portion of sacrum, initial encounter S32.10XA 805.6   6. Hand laceration, right, initial encounter S61.411A 882.0   7. Generalized weakness R53.1 780.79     Patient Active Problem List   Diagnosis   • Left lower quadrant pain   • Right lower quadrant abdominal pain   • Anxiety disorder   • Aortic valve insufficiency   • Arthritis of hand   • Renal atrophy   • Altered bowel function   • Chronic kidney disease, stage III (moderate)   • Dizziness   • Fatigue   • Mild cognitive impairment   • Osteoporosis   • Impaired glucose tolerance   • Arthralgia of hip   • Shoulder pain   • Sinus bradycardia   • Spasm   • Cobalamin deficiency   • Chronic interstitial lung disease   • Abnormality of lung on CXR   • Pulmonary interstitial fibrosis   • Diverticulosis of large intestine   • Neck pain   • Pharyngeal dysphagia   • Cervicogenic headache   • Gait instability   • Calcification of both carotid arteries   • Vertebral artery disease   • Thoracic compression fracture   • Multiple drug allergies   • Fracture of multiple pubic rami   • Hip pain, left   • Leukocytosis   • Dementia without behavioral disturbance   • Laceration of left thumb   • Abnormal LFTs     Past Medical History:   Diagnosis Date   • Abdominal pain, LLQ (left lower quadrant)    • Abdominal pain, RLQ    • Abnormal kidney function study 2015    PT creatnine bumped up in  Feb. Had a contrasted CT in Jan which amy contributed. I had spoken with her GYN who had been prescribing Boniva when he contacted me with her labs. I recommended holding off giving her low creatinine clearance. He was supposed to discuss this with her.   • Abnormal TSH 05/04/2015    Recheck today.   • Anxiety     controlled sub agreement signed 6/16/14   • Aortic regurgitation    • Arthritis of hand, right    • Atrophy of right kidney    • Change in bowel movement    • CKD (chronic kidney disease) stage 3, GFR 30-59 ml/min    • Diverticulosis of large intestine     extensive   • Dizziness    • Fatigue    • GERD (gastroesophageal reflux disease)    • H/O colonoscopy     Complete (Fiberoptic)   • Hip pain, right    • Long Q-T syndrome    • Memory changes    • Osteoporosis     This has been managed by Dr. Grimes.  Her last DEXA 11/5/2012.  She had been on IV Boniva for quite a long time.   • Personal history of coronary atherosclerosis    • Personal history of urinary tract infection 06/16/2014    Repeat urine today.   • Pneumonia    • Prediabetes    • Shoulder pain, left    • Sinus bradycardia    • Vertigo 11/03/2014    Refill Meclizine for use as needed.   • Vitamin B12 deficiency      Past Surgical History:   Procedure Laterality Date   • BREAST SURGERY      Biopsy, Open   • CARDIAC CATHETERIZATION     • CHOLECYSTECTOMY     • EYE SURGERY      Cataract          PT ASSESSMENT (last 72 hours)      PT Evaluation       04/18/17 1321 04/17/17 1446    Rehab Evaluation    Document Type evaluation  -MQ     Subjective Information agree to therapy;complains of;pain  -MQ     Patient Effort, Rehab Treatment adequate  -MQ     Symptoms Noted During/After Treatment fatigue;increased pain  -MQ     General Information    Patient Profile Review yes  -MQ     Onset of Illness/Injury or Date of Surgery Date 04/17/17  -MQ     Referring Physician Alex  -MQ     General Observations Pt supine in bed, no acute distress.  -MQ      Pertinent History Of Current Problem Pt fell sustaining multiple pubic rami fractures. Pt recently had another fall sustaining throacic vertebral compression fractures.  -MQ     Precautions/Limitations fall precautions;brace on when up;other (see comments)   TLSO; Pt is WBAT  -MQ     Prior Level of Function --   Pt unable to report PLOF.  -MQ     Equipment Currently Used at Home none  -MQ none  -SS    Plans/Goals Discussed With patient  -MQ     Barriers to Rehab cognitive status;medically complex;physical barrier   pain  -MQ     Living Environment    Lives With facility resident  - facility resident  -SS    Living Arrangements assisted living  -MQ assisted living  -SS    Home Accessibility no concerns  -MQ no concerns  -SS    Stair Railings at Home  none  -SS    Type of Financial/Environmental Concern  none  -SS    Transportation Available  none  -SS    Clinical Impression    Date of Referral to PT 04/17/17  -MQ     Patient/Family Goals Statement Pt unable to state a goal.  -MQ     Criteria for Skilled Therapeutic Interventions Met treatment indicated  -MQ     Impairments Found (describe specific impairments) aerobic capacity/endurance;arousal, attention, and cognition;ergonomics and body mechanics;gait, locomotion, and balance  -MQ     Rehab Potential fair, will monitor progress closely  -MQ     Vital Signs    Pretreatment Heart Rate (beats/min) 87  -MQ     Intratreatment Heart Rate (beats/min) 92  -MQ     Posttreatment Heart Rate (beats/min) 90  -MQ     Pre Patient Position Supine  -MQ     Intra Patient Position Standing  -MQ     Post Patient Position Supine  -MQ     Pain Assessment    Pain Assessment 0-10  -MQ     Pain Score 7  -MQ     Pain Type Acute pain  -MQ     Pain Location Pelvis  -MQ     Pain Intervention(s) Medication (See MAR);Repositioned  -MQ     Cognitive Assessment/Intervention    Current Cognitive/Communication Assessment impaired  -MQ     Orientation Status oriented to;person  -MQ     Follows  Commands/Answers Questions able to follow single-step instructions;50% of the time;needs cueing;needs increased time;needs repetition  -     Personal Safety severe impairment;decreased awareness, need for assist;decreased awareness, need for safety;decreased insight to deficits  -     Personal Safety Interventions fall prevention program maintained;gait belt;nonskid shoes/slippers when out of bed;supervised activity  -     ROM (Range of Motion)    General ROM no range of motion deficits identified  -     MMT (Manual Muscle Testing)    General MMT Assessment lower extremity strength deficits identified  -     General MMT Assessment Detail BLEs grossly 3-/5  -     Mobility Assessment/Training    Extremity Weight-Bearing Status left lower extremity;right lower extremity  -     Left Lower Extremity Weight-Bearing weight-bearing as tolerated  -     Right Lower Extremity Weight-Bearing weight-bearing as tolerated  -     Bed Mobility, Assessment/Treatment    Bed Mobility, Assistive Device bed rails;head of bed elevated;draw sheet  -     Bed Mobility, Roll Left, [place] maximum assist (25% patient effort);2 person assist required;verbal cues required;nonverbal cues required (demo/gesture)  -     Bed Mobility, Roll Right, [place] maximum assist (25% patient effort);2 person assist required;verbal cues required;nonverbal cues required (demo/gesture)  -     Bed Mobility, Scoot/Bridge, [place] maximum assist (25% patient effort);2 person assist required;verbal cues required;nonverbal cues required (demo/gesture)  -     Bed Mob, Supine to Sit, [place] maximum assist (25% patient effort);2 person assist required;verbal cues required;nonverbal cues required (demo/gesture)  -     Bed Mob, Sit to Supine, [place] maximum assist (25% patient effort);2 person assist required;verbal cues required;nonverbal cues required (demo/gesture)  -     Bed Mobility, Safety Issues cognitive  deficits limit understanding;decreased use of legs for bridging/pushing;impaired trunk control for bed mobility  -MQ     Bed Mobility, Impairments strength decreased;impaired balance;pain  -     Transfer Assessment/Treatment    Transfers, Sit-Stand [place] maximum assist (25% patient effort);2 person assist required;verbal cues required;nonverbal cues required (demo/gesture)  -MQ     Transfers, Stand-Sit [place] maximum assist (25% patient effort);2 person assist required;verbal cues required;nonverbal cues required (demo/gesture)  -MQ     Transfers, Sit-Stand-Sit, Assist Device rolling walker  -MQ     Transfer, Safety Issues sequencing ability decreased;weight-shifting ability decreased;loses balance backward  -     Transfer, Impairments strength decreased;impaired balance;pain  -     Gait Assessment/Treatment    Gait, [place] Level unable to perform  -     Balance Skills Training    Sitting-Level of Assistance Contact guard;Minimum assistance  -     Sitting-Balance Support Feet supported;Right upper extremity supported;Left upper extremity supported  -     Standing-Level of Assistance Maximum assistance;x2  -MQ     Static Standing Balance Support assistive device  -     Positioning and Restraints    Pre-Treatment Position in bed  -     Post Treatment Position bed  -     In Bed supine;call light within reach;encouraged to call for assist;exit alarm on;SCD pump applied  -       User Key  (r) = Recorded By, (t) = Taken By, (c) = Cosigned By    Initials Name Provider Type     Goldie Manning RN Registered Nurse     Juliet Baumann, PT Physical Therapist          Physical Therapy Education     Title: PT OT SLP Therapies (Active)     Topic: Physical Therapy (Active)     Point: Mobility training (Active)    Learning Progress Summary    Learner Readiness Method Response Comment Documented by Status   Patient Acceptance E,TB,D NR   04/18/17 3657 Active               Point: Home  exercise program (Active)    Learning Progress Summary    Learner Readiness Method Response Comment Documented by Status   Patient Acceptance E,TB,D NR   04/18/17 1347 Active               Point: Body mechanics (Active)    Learning Progress Summary    Learner Readiness Method Response Comment Documented by Status   Patient Acceptance E,TB,D NR   04/18/17 1347 Active                      User Key     Initials Effective Dates Name Provider Type Discipline     12/11/15 -  Juliet Baumann, PT Physical Therapist PT                PT Recommendation and Plan  Anticipated Equipment Needs At Discharge: two wheeled walker  Anticipated Discharge Disposition: skilled nursing facility  Planned Therapy Interventions: balance training, bed mobility training, gait training, patient/family education, strengthening, transfer training  PT Frequency: daily  Plan of Care Review  Outcome Summary/Follow up Plan: Pt with multiple medical comorbidities and decreased cognition presents with BLE weakness, impaired balance, poor functional activity tolerance, and decreased safety/level of independence following multiple pubic rami and thoracic vertebral fractures. Pt would benefit from skilled PT services at this time to address these functional deficits.           IP PT Goals       04/18/17 1347          Bed Mobility PT LTG    Bed Mobility PT LTG, Date Established 04/18/17  -      Bed Mobility PT LTG, Time to Achieve 1 wk  -      Bed Mobility PT LTG, Activity Type all bed mobility  -      Bed Mobility PT LTG, [place] Level moderate assist (50% patient effort);2 person assist required  -      Bed Mobility PT Goal  LTG, Assist Device bed rails  -      Transfer Training PT LTG    Transfer Training PT LTG, Date Established 04/18/17  -      Transfer Training PT LTG, Time to Achieve 1 wk  -      Transfer Training PT LTG, Activity Type bed to chair /chair to bed;sit to stand/stand to sit  -      Transfer Training PT LTG,  [place] Level moderate assist (50% patient effort);2 person assist required  -MQ      Transfer Training PT LTG, Assist Device walker, rolling  -MQ      Gait Training PT LTG    Gait Training Goal PT LTG, Date Established 04/18/17  -MQ      Gait Training Goal PT LTG, Time to Achieve 1 wk  -MQ      Gait Training Goal PT LTG, [place] Level moderate assist (50% patient effort);2 person assist required  -MQ      Gait Training Goal PT LTG, Assist Device walker, rolling  -MQ      Gait Training Goal PT LTG, Distance to Achieve 3  -MQ        User Key  (r) = Recorded By, (t) = Taken By, (c) = Cosigned By    Initials Name Provider Type    AIDE Baumann PT Physical Therapist                Outcome Measures       04/18/17 1321          How much help from another person do you currently need...    Turning from your back to your side while in flat bed without using bedrails? 2  -MQ      Moving from lying on back to sitting on the side of a flat bed without bedrails? 2  -MQ      Moving to and from a bed to a chair (including a wheelchair)? 1  -MQ      Standing up from a chair using your arms (e.g., wheelchair, bedside chair)? 2  -MQ      Climbing 3-5 steps with a railing? 1  -MQ      To walk in hospital room? 1  -MQ      AM-PAC 6 Clicks Score 9  -MQ      Functional Assessment    Outcome Measure Options AM-PAC 6 Clicks Basic Mobility (PT)  -MQ        User Key  (r) = Recorded By, (t) = Taken By, (c) = Cosigned By    Initials Name Provider Type    AIDE Baumann PT Physical Therapist           Time Calculation:         PT Charges       04/18/17 1350          Time Calculation    Start Time 1320  -MQ      Stop Time 1343  -MQ      Time Calculation (min) 23 min  -MQ      PT Received On 04/18/17  -MQ      PT - Next Appointment 04/19/17  -MQ      PT Goal Re-Cert Due Date 04/25/17  -MQ        User Key  (r) = Recorded By, (t) = Taken By, (c) = Cosigned By    Initials Name Provider Type    AIDE Baumann PT Physical  Therapist          Therapy Charges for Today     Code Description Service Date Service Provider Modifiers Qty    54138352086 HC PT EVAL HIGH COMPLEXITY 3 4/18/2017 Juliet Baumann, PT GP 1    57075192672 HC PT THER PROC EA 15 MIN 4/18/2017 Juliet Baumann, PT GP 2    75873163556 HC PT THER SUPP EA 15 MIN 4/18/2017 Juliet Baumann, PT GP 2          PT G-Codes  Outcome Measure Options: AM-PAC 6 Clicks Basic Mobility (PT)      Juliet Baumann, PT  4/18/2017

## 2017-04-18 NOTE — PLAN OF CARE
Problem: Fall Risk (Adult)  Goal: Absence of Falls  Outcome: Ongoing (interventions implemented as appropriate)    Problem: Patient Care Overview (Adult)  Goal: Plan of Care Review  Outcome: Ongoing (interventions implemented as appropriate)    04/18/17 0633   Patient Care Overview   Progress no change   Outcome Evaluation   Outcome Summary/Follow up Plan Vitals stable. No falls. No c/o pain. Pt very agitated at times, wants to go home. Turned Q2.        Goal: Adult Individualization and Mutuality  Outcome: Ongoing (interventions implemented as appropriate)  Goal: Discharge Needs Assessment  Outcome: Ongoing (interventions implemented as appropriate)    Problem: Orthopaedic Fracture (Adult)  Goal: Signs and Symptoms of Listed Potential Problems Will be Absent or Manageable (Orthopaedic Fracture)  Outcome: Ongoing (interventions implemented as appropriate)    Problem: Skin Integrity Impairment, Risk/Actual (Adult)  Goal: Identify Related Risk Factors and Signs and Symptoms  Outcome: Outcome(s) achieved Date Met:  04/18/17  Goal: Skin Integrity/Wound Healing  Outcome: Outcome(s) achieved Date Met:  04/18/17

## 2017-04-18 NOTE — PLAN OF CARE
Problem: Fall Risk (Adult)  Goal: Absence of Falls  Outcome: Ongoing (interventions implemented as appropriate)    Problem: Patient Care Overview (Adult)  Goal: Plan of Care Review  Outcome: Ongoing (interventions implemented as appropriate)    04/18/17 0633 04/18/17 1815   Patient Care Overview   Progress no change --    Outcome Evaluation   Outcome Summary/Follow up Plan --  No falls during shift. No complaints of pain. Refusing some meds. [place] in place. Up to chair assist x3. Worked with PT. Will continue to monitor.        Goal: Adult Individualization and Mutuality  Outcome: Ongoing (interventions implemented as appropriate)  Goal: Discharge Needs Assessment  Outcome: Ongoing (interventions implemented as appropriate)    Problem: Orthopaedic Fracture (Adult)  Goal: Signs and Symptoms of Listed Potential Problems Will be Absent or Manageable (Orthopaedic Fracture)  Outcome: Ongoing (interventions implemented as appropriate)

## 2017-04-18 NOTE — PLAN OF CARE
Problem: Patient Care Overview (Adult)  Goal: Plan of Care Review    04/18/17 9592   Patient Care Overview   Progress no change   Outcome Evaluation   Outcome Summary/Follow up Plan Pt becomes agitated very easily. Educated on importance of getting up out of bed, repositioning q2h. Pt doesn't allow nurses or aides to help turn or assist to chair. Pt is very argumentative and at times verbally abusive. Patient is mistrustful towards staff. Will continue to monitor and educate patient and family on plan of care.

## 2017-04-18 NOTE — PLAN OF CARE
Problem: Patient Care Overview (Adult)  Goal: Plan of Care Review  Outcome: Ongoing (interventions implemented as appropriate)    04/18/17 1347   Outcome Evaluation   Outcome Summary/Follow up Plan Pt with multiple medical comorbidities and decreased cognition presents with BLE weakness, impaired balance, poor functional activity tolerance, and decreased safety/level of independence following multiple pubic rami and thoracic vertebral fractures. Pt would benefit from skilled PT services at this time to address these functional deficits.          Problem: Inpatient Physical Therapy  Goal: Bed Mobility Goal LTG- PT  Outcome: Ongoing (interventions implemented as appropriate)    04/18/17 1347   Bed Mobility PT LTG   Bed Mobility PT LTG, Date Established 04/18/17   Bed Mobility PT LTG, Time to Achieve 1 wk   Bed Mobility PT LTG, Activity Type all bed mobility   Bed Mobility PT LTG, [place] Level moderate assist (50% patient effort);2 person assist required   Bed Mobility PT Goal LTG, Assist Device bed rails       Goal: Transfer Training Goal 1 LTG- PT  Outcome: Ongoing (interventions implemented as appropriate)    04/18/17 1347   Transfer Training PT LTG   Transfer Training PT LTG, Date Established 04/18/17   Transfer Training PT LTG, Time to Achieve 1 wk   Transfer Training PT LTG, Activity Type bed to chair /chair to bed;sit to stand/stand to sit   Transfer Training PT LTG, [place] Level moderate assist (50% patient effort);2 person assist required   Transfer Training PT LTG, Assist Device walker, rolling       Goal: Gait Training Goal LTG- PT  Outcome: Ongoing (interventions implemented as appropriate)    04/18/17 1347   Gait Training PT LTG   Gait Training Goal PT LTG, Date Established 04/18/17   Gait Training Goal PT LTG, Time to Achieve 1 wk   Gait Training Goal PT LTG, [place] Level moderate assist (50% patient effort);2 person assist required   Gait Training Goal PT LTG, Assist Device walker,  rolling   Gait Training Goal PT LTG, Distance to Achieve 3

## 2017-04-18 NOTE — PROGRESS NOTES
"    DAILY PROGRESS NOTE  [facility]    Patient Identification:  Name: Katharine Mitchell  Age: 87 y.o.  Sex: female  :  1929  MRN: [id number]         Primary Care Physician: Goldie Madera MD    Subjective:  Interval History: confused - pain in left hip - friend at bedside notes decline - d/w son Armando allan regarding lab values and clinical picture. D/w ccp regarding TOO. No cp/sob/n/v    Objective:    Scheduled Meds:  aspirin 81 mg Oral Daily   famotidine 20 mg Oral Daily   lidocaine 1 patch Transdermal Q24H   rivastigmine 1 patch Transdermal Daily     Continuous Infusions:     Vital signs in last 24 hours:  Temp:  [97.1 °F (36.2 °C)-98.3 °F (36.8 °C)] 98.3 °F (36.8 °C)  Heart Rate:  [] 102  Resp:  [16-18] 16  BP: ()/(54-89) 124/81    Intake/Output:    Intake/Output Summary (Last 24 hours) at 17 1118  Last data filed at 17 1006   Gross per 24 hour   Intake              120 ml   Output              720 ml   Net             -600 ml       Exam:  /81 (BP Location: Right arm, Patient Position: Lying)  Pulse 102  Temp 98.3 °F (36.8 °C) (Oral)   Resp 16  Ht 66\" (167.6 cm)  Wt 116 lb 14.4 oz (53 kg)  SpO2 95%  BMI 18.87 kg/m2    General Appearance:    Alerts, cooperative but confused/demented                          Head:    Normocephalic, without obvious abnormality, atraumatic                           Eyes:    PERRL, conjunctiva/corneas clear, EOM's intact, both eyes                         Throat:   Lips, tongue, gums normal; oral mucosa pink and moist                         Lungs:    Clear to auscultation bilaterally, respirations unlabored                          Heart:    Regular rate and rhythm, S1 and S2 normal                  Abdomen:     Soft, non-tender especially RUQ/epigastric, bowel sounds active                  Extremities:   Still pain w/ LL hip flexion to ~30degress, no cyanosis or edema                        Pulses:   Pulses palpable in " lower extremities                                 Data Review:  Labs in chart were reviewed.    Assessment:  Active Hospital Problems (** Indicates Principal Problem)    Diagnosis Date Noted   • **Fracture of multiple pubic rami [S32.599A] 04/17/2017   • Hip pain, left [M25.552] 04/17/2017   • Leukocytosis [D72.829] 04/17/2017   • Dementia without behavioral disturbance [F03.90] 04/17/2017   • Laceration of left thumb [S61.012A] 04/17/2017   • Abnormal LFTs [R79.89] 04/17/2017   • Thoracic compression fracture [S22.000A] 04/12/2017   • Chronic kidney disease, stage III (moderate) [N18.3] 01/20/2016      Resolved Hospital Problems    Diagnosis Date Noted Date Resolved   No resolved problems to display.       Plan:  Pelvic fractures are nonoperable - PT to eval WBAT as TLSO brace is at bedside for previous VCF   -Tramadol w/ IV MS only for severe breakthrough    -healing will be compounded by Osteoporosis     Leukocytosis - likely reactive as no source of infection and is improving w/out fever - BC neg x2 - elevated PCT likely secondary to LFT     SLIVF     Abnormal LFTs - chronic changes on CT - hold on MRCP/Ultrasound or GO consultation as no abdominal pains/n/v and is o/w asymptomatic  -repeat CMP demonstrates improvement - hep panel pending  -likely made worse by Tylenol   -d/w son - conservative w/ w/up given age/dementia     SCDs for DVT prophylaxis     CCP - needs [facility] in [place] is top choice    Jeovany Johnson MD  4/18/2017  11:18 AM

## 2017-04-18 NOTE — DISCHARGE PLACEMENT REQUEST
"Ailyn Thomas (87 y.o. Female)     Date of Birth Social Security Number Address Home Phone MRN    07/29/1929  [facility] [place] [address] [phone] [id number]    Yazdanism Marital Status          None        Admission Date Admission Type Admitting Provider Attending Provider Department, Room/Bed    4/17/17 Emergency Jeovany Johnson MD Mas[place], Troy Andrew, MD [address], 537/1    Discharge Date Discharge Disposition Discharge Destination                      Attending Provider: Jeovany Johnson MD     Allergies:  Alendronate, Amlodipine, Aspirin, Axid [Nizatidine], Azithromycin, Codeine, Donepezil, Estrogens, Fentanyl, Fluticasone, Hydrocodone-acetaminophen, Levofloxacin, Naproxen, Niacin, Nsaids, Omeprazole, Other, Oxycodone, Penicillins, Prednisone, Proton Pump Inhibitors, Rabeprazole, Raloxifene, Risedronate Sodium, Sulfa Antibiotics, Tetracyclines & Related    Isolation:  None   Infection:  None   Code Status:  FULL    Ht:  66\" (167.6 cm)   Wt:  116 lb 14.4 oz (53 kg)    Admission Cmt:  None   Principal Problem:  Fracture of multiple pubic rami [S32.599A]                 Active Insurance as of 4/17/2017     Primary Coverage     Payor Plan Insurance Group Employer/Plan Group    [facility] MEDICARE REPLACEMENT [facility] [id number]     Payor Plan Address Payor Plan Phone Number Effective From Effective To    [address] [address]  1/1/2016     [place] [address]       Subscriber Name Subscriber Birth Date Member ID       AILYN THOMAS 7/29/1929 [id number]                 Emergency Contacts      (Rel.) Home Phone Work Phone Mobile Phone    BillArmando Vizcarra (Son) [phone] -- [phone]    CarlosErin (Daughter) [phone] -- [phone]              "

## 2017-04-19 PROBLEM — D69.6 THROMBOCYTOPENIA (HCC): Status: ACTIVE | Noted: 2017-01-01

## 2017-04-19 NOTE — PROGRESS NOTES
Continued Stay Note  [facility]     Patient Name: Katharine Mitchell  MRN: [id number]  Today's Date: 4/19/2017    Admit Date: 4/17/2017          Discharge Plan       04/19/17 1110    Case Management/Social Work Plan    Plan Referral to [facility] evaluating.     Patient/Family In Agreement With Plan yes    Additional Comments Recieved call from pts son Armando who states back up choice is [facility] and he was with Lila in admissions. CCP spoke with Lila and she requested CCP fax clinicals to [phone] to evaluate, she states bed availablity is limited but will assess pt and contact CCP. Faxed clinicals. Tanisha BAGLEY/MADI              Discharge Codes     None            Lea Farnsworth, RN

## 2017-04-19 NOTE — TELEPHONE ENCOUNTER
Approve orders for home health pt to continue once pt is released from the hospital for her fall with pelvic fracture.     Orders for continuation approved.

## 2017-04-19 NOTE — PLAN OF CARE
Problem: Patient Care Overview (Adult)  Goal: Plan of Care Review  Outcome: Ongoing (interventions implemented as appropriate)    04/19/17 1130   Patient Care Overview   Progress progress towards functional goals is fair   Outcome Evaluation   Outcome Summary/Follow up Plan Pt able to stand at EOB today, but unable to take any steps secondary to pain.

## 2017-04-19 NOTE — PROGRESS NOTES
"    DAILY PROGRESS NOTE  [facility]    Patient Identification:  Name: Katharine Mitchell  Age: 87 y.o.  Sex: female  :  1929  MRN: [id number]         Primary Care Physician: Goldie Madera MD    Subjective:  Interval History: pulled perez overnight - frustrated over immobility. Denies cp/sob    Objective:no fm    Scheduled Meds:    aspirin 81 mg Oral Daily   famotidine 20 mg Oral Daily   lidocaine 1 patch Transdermal Q24H   rivastigmine 1 patch Transdermal Daily     Continuous Infusions:     Vital signs in last 24 hours:  Temp:  [97.6 °F (36.4 °C)-98.7 °F (37.1 °C)] 97.8 °F (36.6 °C)  Heart Rate:  [] 70  Resp:  [16-20] 18  BP: (122-155)/(66-90) 154/76    Intake/Output:    Intake/Output Summary (Last 24 hours) at 17 1105  Last data filed at 17 0623   Gross per 24 hour   Intake              610 ml   Output             1000 ml   Net             -390 ml       Exam:  /76 (BP Location: Right arm, Patient Position: Lying)  Pulse 70  Temp 97.8 °F (36.6 °C) (Oral)   Resp 18  Ht 66\" (167.6 cm)  Wt 115 lb 4.8 oz (52.3 kg)  SpO2 99%  BMI 18.61 kg/m2    General Appearance:    Alert, cooperative, no distress, demented                          Head:    Normocephalic, without obvious abnormality, atraumatic                         Throat:   Lips, tongue, gums normal; oral mucosa pink and moist                           Neck:   Supple, no JVD                         Lungs:    Clear to auscultation bilaterally, respirations unlabored                          Heart:    Regular rate and rhythm, S1 and S2 normal                  Abdomen:     Soft, non-tender, bowel sounds active                  Extremities:   No cyanosis or edema                             Data Review:  Labs in chart were reviewed.    Assessment:  Active Hospital Problems (** Indicates Principal Problem)    Diagnosis Date Noted   • **Fracture of multiple pubic rami [S32.599A] 2017   • Thrombocytopenia [D69.6] " 04/19/2017   • Hip pain, left [M25.552] 04/17/2017   • Leukocytosis [D72.829] 04/17/2017   • Dementia without behavioral disturbance [F03.90] 04/17/2017   • Laceration of left thumb [S61.012A] 04/17/2017   • Abnormal LFTs [R79.89] 04/17/2017   • Thoracic compression fracture [S22.000A] 04/12/2017   • Chronic kidney disease, stage III (moderate) [N18.3] 01/20/2016      Resolved Hospital Problems    Diagnosis Date Noted Date Resolved   No resolved problems to display.       Plan:  Pelvic fractures are nonoperable - PT to eval WBAT as TLSO brace is at bedside for previous VCF  -Tramadol w/ IV MS only for severe breakthrough   -healing will be compounded by Osteoporosis      Leukocytosis - likely reactive as no source of infection and is improving w/out fever - BC neg x2 - elevated PCT likely secondary to LFT      SLIVF      Abnormal LFTs - chronic changes on CT - hold on MRCP/Ultrasound or GO consultation as no abdominal pains/n/v and is o/w asymptomatic  -repeat CMP demonstrates continued improvement - hep panel neg  -likely made worse by Tylenol   -d/w son - conservative w/ w/up given age/dementia    TCP - mild/monitor      SCDs for DVT prophylaxis      CCP - needs [facility] in [place] is top choice but now wanting [facility] - D/w son otp and CCP    Jeovany Johnson MD  4/19/2017  11:05 AM

## 2017-04-19 NOTE — PLAN OF CARE
Problem: Fall Risk (Adult)  Goal: Absence of Falls  Outcome: Ongoing (interventions implemented as appropriate)    Problem: Patient Care Overview (Adult)  Goal: Plan of Care Review  Outcome: Ongoing (interventions implemented as appropriate)    04/19/17 1130 04/19/17 0089   Patient Care Overview   Progress progress towards functional goals is fair --    Outcome Evaluation   Outcome Summary/Follow up Plan --  No falls during shift. Complaints of pain. Confused, agitated. Argumentative. Worked with PT today. Pulled out 2nd perez, keeping out. Incontinent. Will continue to monitor.        Goal: Adult Individualization and Mutuality  Outcome: Ongoing (interventions implemented as appropriate)  Goal: Discharge Needs Assessment  Outcome: Ongoing (interventions implemented as appropriate)    Problem: Orthopaedic Fracture (Adult)  Goal: Signs and Symptoms of Listed Potential Problems Will be Absent or Manageable (Orthopaedic Fracture)  Outcome: Ongoing (interventions implemented as appropriate)    Problem: Skin Integrity Impairment, Risk/Actual (Adult)  Goal: Skin Integrity/Wound Healing  Outcome: Ongoing (interventions implemented as appropriate)

## 2017-04-19 NOTE — PLAN OF CARE
Problem: Fall Risk (Adult)  Goal: Absence of Falls  Outcome: Ongoing (interventions implemented as appropriate)    Problem: Patient Care Overview (Adult)  Goal: Plan of Care Review  Outcome: Ongoing (interventions implemented as appropriate)    04/19/17 0421   Patient Care Overview   Progress no change   Outcome Evaluation   Outcome Summary/Follow up Plan Vitals stable. No falls. Pt c/o pain x1, medicated per MAR. No falls during shift. Pt agitated with nursing care. Pt states she is getting no help here and she wants to leave. Pt very argumentative with staff. Refusing Q2 turns, educated on imprtance of turns. Pt pulled perez catheter out with balloon inflated, catheter reinserted with no complications. Monitoring closely.        Goal: Adult Individualization and Mutuality  Outcome: Ongoing (interventions implemented as appropriate)  Goal: Discharge Needs Assessment  Outcome: Ongoing (interventions implemented as appropriate)    Problem: Orthopaedic Fracture (Adult)  Goal: Signs and Symptoms of Listed Potential Problems Will be Absent or Manageable (Orthopaedic Fracture)  Outcome: Ongoing (interventions implemented as appropriate)    Problem: Skin Integrity Impairment, Risk/Actual (Adult)  Goal: Skin Integrity/Wound Healing  Outcome: Ongoing (interventions implemented as appropriate)

## 2017-04-20 PROBLEM — D69.6 THROMBOCYTOPENIA (HCC): Status: RESOLVED | Noted: 2017-01-01 | Resolved: 2017-01-01

## 2017-04-20 NOTE — PROGRESS NOTES
[facility]    Physicians Statement of Medical Necessity for Ambulance Transportation    It is medically necessary for:    Patient Name: Katharine Mitchell    Insurance Information:  SEE FACESHEET    To be transported by ambulance: [facility]    From (if nursing facility, specify level of care: skilled, assisted, etc): [facility]    To (specify level of care if nursing facility): [facility]    Date of Service: 4-20-17    For dialysis patients state date dialysis began: N/A    Diagnosis: FRACTURE OF MULTI PUBIC RAMI    Past Medical/Surgical History:  Past Medical History:   Diagnosis Date   • Abdominal pain, LLQ (left lower quadrant)    • Abdominal pain, RLQ    • Abnormal kidney function study 05/04/2015    PT myra bumped up in Feb. Had a contrasted CT in Jan which liikely contributed. I had spoken with her GYN who had been prescribing Boniva when he contacted me with her labs. I recommended holding off giving her low creatinine clearance. He was supposed to discuss this with her.   • Abnormal TSH 05/04/2015    Recheck today.   • Anxiety     controlled sub agreement signed 6/16/14   • Aortic regurgitation    • Arthritis of hand, right    • Atrophy of right kidney    • Change in bowel movement    • CKD (chronic kidney disease) stage 3, GFR 30-59 ml/min    • Diverticulosis of large intestine     extensive   • Dizziness    • Fatigue    • GERD (gastroesophageal reflux disease)    • H/O colonoscopy     Complete (Fiberoptic)   • Hip pain, right    • Long Q-T syndrome    • Memory changes    • Osteoporosis     This has been managed by Dr. Grimes.  Her last DEXA 11/5/2012.  She had been on IV Boniva for quite a long time.   • Personal history of coronary atherosclerosis    • Personal history of urinary tract infection 06/16/2014    Repeat urine today.   • Pneumonia    • Prediabetes    • Shoulder pain, left    • Sinus bradycardia    • Vertigo 11/03/2014    Refill  Meclizine for use as needed.   • Vitamin B12 deficiency       Past Surgical History:   Procedure Laterality Date   • BREAST SURGERY      Biopsy, Open   • CARDIAC CATHETERIZATION     • CHOLECYSTECTOMY     • EYE SURGERY      Cataract        Current Objective Medical Evidence(including physical exam finding to support reason for limitations):    End stage Alzheimer's disease  Immobilization syndrome  Back brace  Confused/combative: may require restraints  Requires oxygen    Other: Falls Risk    Physician Signature:           (RN,NP,PA,CAN, Discharge Planner) Date/Time: 4-20-17 1400     Printed Name:    ___________talat BAGLEY/MADI_______________________    [facility]   Phone: [phone] Phone: [phone] Phone: [phone]   Fax: [phone] Fax: [phone] Fax: [phone]

## 2017-04-20 NOTE — PLAN OF CARE
Problem: Patient Care Overview (Adult)  Goal: Plan of Care Review  Outcome: Ongoing (interventions implemented as appropriate)    04/20/17 0918   Patient Care Overview   Progress improving   Outcome Evaluation   Outcome Summary/Follow up Plan Pt improved level of independence with bed mobility and transfers today, but is still unable to ambulate secondary to pain in pelvis.

## 2017-04-20 NOTE — PROGRESS NOTES
Acute Care - Physical Therapy Treatment Note  [facility]     Patient Name: Katharine Mitchell  : 1929  MRN: [id number]  Today's Date: 2017  Onset of Illness/Injury or Date of Surgery Date: 17  Date of Referral to PT: 17  Referring Physician: Alex    Admit Date: 2017    Visit Dx:    ICD-10-CM ICD-9-CM   1. Fracture of multiple pubic rami, left, closed, initial encounter S32.592A 808.2   2. Leukocytosis, unspecified type D72.829 288.60   3. Elevated LFTs R79.89 790.6   4. Fall, initial encounter W19.XXXA E888.9   5. Closed fracture of sacrum, unspecified portion of sacrum, initial encounter S32.10XA 805.6   6. Hand laceration, right, initial encounter S61.411A 882.0   7. Generalized weakness R53.1 780.79     Patient Active Problem List   Diagnosis   • Left lower quadrant pain   • Right lower quadrant abdominal pain   • Anxiety disorder   • Aortic valve insufficiency   • Arthritis of hand   • Renal atrophy   • Altered bowel function   • Chronic kidney disease, stage III (moderate)   • Dizziness   • Fatigue   • Mild cognitive impairment   • Osteoporosis   • Impaired glucose tolerance   • Arthralgia of hip   • Shoulder pain   • Sinus bradycardia   • Spasm   • Cobalamin deficiency   • Chronic interstitial lung disease   • Abnormality of lung on CXR   • Pulmonary interstitial fibrosis   • Diverticulosis of large intestine   • Neck pain   • Pharyngeal dysphagia   • Cervicogenic headache   • Gait instability   • Calcification of both carotid arteries   • Vertebral artery disease   • Thoracic compression fracture   • Multiple drug allergies   • Fracture of multiple pubic rami   • Hip pain, left   • Leukocytosis   • Dementia without behavioral disturbance   • Laceration of left thumb   • Abnormal LFTs   • Thrombocytopenia               Adult Rehabilitation Note       17 0906 17 1126       Rehab Assessment/Intervention    Discipline physical therapist  -MQ physical therapist  -MQ      Document Type therapy note (daily note)  -MQ therapy note (daily note)  -MQ     Subjective Information no complaints;agree to therapy  -MQ no complaints;agree to therapy  -MQ     Patient Effort, Rehab Treatment adequate  -MQ fair  -MQ     Symptoms Noted During/After Treatment fatigue;increased pain  -MQ fatigue;increased pain  -MQ     Precautions/Limitations fall precautions;brace on when up;swallowing precautions   TLSO; pt is WBAT  -MQ fall precautions;brace on when up;other (see comments)   TLSO, pt is WBAT  -MQ     Precautions/Limitations, Vision WFL  -MQ      Precautions/Limitations, Hearing WFL  -MQ      Recorded by [MQ] Juliet Baumann PT [MQ] Juliet Baumann PT     Vital Signs    Pretreatment Heart Rate (beats/min) 103  -  -MQ     Intratreatment Heart Rate (beats/min) 116  -  -MQ     Posttreatment Heart Rate (beats/min) 110  -  -MQ     Pre Patient Position Supine  -MQ Supine  -MQ     Intra Patient Position Standing  -MQ Standing  -MQ     Post Patient Position Supine  -MQ Supine  -MQ     Recorded by [MQ] Juliet Baumann, PT [MQ] Juliet Baumann PT     Pain Assessment    Pain Assessment 0-10  -MQ 0-10  -MQ     Pain Score 8  -MQ 9  -MQ     Pain Type Acute pain  -MQ Acute pain  -MQ     Pain Location Pelvis  -MQ Pelvis  -MQ     Pain Intervention(s) Medication (See MAR);Repositioned  -MQ Medication (See MAR);Repositioned  -MQ     Recorded by [MQ] Juliet Baumann PT [MQ] Juliet Baumann PT     Cognitive Assessment/Intervention    Current Cognitive/Communication Assessment impaired  -MQ impaired  -MQ     Orientation Status oriented to;person  -MQ oriented to;person  -MQ     Follows Commands/Answers Questions able to follow single-step instructions;75% of the time;needs cueing;needs increased time;needs repetition  -MQ able to follow single-step instructions;50% of the time;needs cueing;needs increased time;needs repetition  -MQ     Personal Safety moderate impairment;decreased awareness, need for  assist;decreased awareness, need for safety;decreased insight to deficits  - severe impairment;decreased awareness, need for assist;decreased awareness, need for safety;decreased insight to deficits  -     Personal Safety Interventions fall prevention program maintained;gait belt;nonskid shoes/slippers when out of bed;supervised activity  - fall prevention program maintained;gait belt;nonskid shoes/slippers when out of bed;supervised activity  -     Recorded by [MQ] Julite Baumann, PT [MQ] Juliet Baumann, PT     Mobility Assessment/Training    Left Lower Extremity Weight-Bearing weight-bearing as tolerated  - weight-bearing as tolerated  -     Right Lower Extremity Weight-Bearing weight-bearing as tolerated  - weight-bearing as tolerated  -     Recorded by [MQ] Juliet Baumann, PT [MQ] Juliet Baumann, PT     Bed Mobility, Assessment/Treatment    Bed Mobility, Assistive Device bed rails;head of bed elevated;draw sheet  - bed rails;head of bed elevated;draw sheet  -     Bed Mobility, Scoot/Bridge, [place] dependent (less than 25% patient effort);2 person assist required  - dependent (less than 25% patient effort);2 person assist required  -     Bed Mob, Supine to Sit, [place] moderate assist (50% patient effort);2 person assist required;verbal cues required;nonverbal cues required (demo/gesture)  - maximum assist (25% patient effort);2 person assist required;verbal cues required;nonverbal cues required (demo/gesture)  -     Bed Mob, Sit to Supine, [place] maximum assist (25% patient effort);2 person assist required;verbal cues required;nonverbal cues required (demo/gesture)  - maximum assist (25% patient effort);2 person assist required;verbal cues required;nonverbal cues required (demo/gesture)  -     Recorded by [MQ] Juliet Baumann, PT [MQ] Juliet Baumann, PT     Transfer Assessment/Treatment    Transfers, Sit-Stand [place] moderate assist (50% patient effort);2  person assist required;verbal cues required;nonverbal cues required (demo/gesture)  - maximum assist (25% patient effort);2 person assist required;verbal cues required;nonverbal cues required (demo/gesture)  -     Transfers, Stand-Sit [place] moderate assist (50% patient effort);2 person assist required;verbal cues required;nonverbal cues required (demo/gesture)  - maximum assist (25% patient effort);2 person assist required;verbal cues required;nonverbal cues required (demo/gesture)  -     Transfers, Sit-Stand-Sit, Assist Device rolling walker  - rolling walker  -     Recorded by [MQ] Juliet Baumann PT [MQ] Juliet Baumann PT     Gait Assessment/Treatment    Gait, [place] Level unable to perform;other (see comments)   Pt unable to weight shift secondary to pain  - unable to perform  -     Recorded by [MQ] Juliet Baumann, PT [MQ] Juliet Baumann PT     Positioning and Restraints    Pre-Treatment Position in bed  - in bed  -     Post Treatment Position bed  - bed  -     In Bed supine;call light within reach;encouraged to call for assist;exit alarm on  - supine;call light within reach;encouraged to call for assist;exit alarm on;with nsg  -     Recorded by [] Juliet Baumann PT [] Juliet Baumann PT       User Key  (r) = Recorded By, (t) = Taken By, (c) = Cosigned By    Initials Name Effective Dates     Juliet Baumann PT 12/11/15 -                 IP PT Goals       04/18/17 1347          Bed Mobility PT LTG    Bed Mobility PT LTG, Date Established 04/18/17  -      Bed Mobility PT LTG, Time to Achieve 1 wk  -      Bed Mobility PT LTG, Activity Type all bed mobility  -      Bed Mobility PT LTG, [place] Level moderate assist (50% patient effort);2 person assist required  -      Bed Mobility PT Goal  LTG, Assist Device bed rails  -      Transfer Training PT LTG    Transfer Training PT LTG, Date Established 04/18/17  -      Transfer Training PT LTG, Time to  Achieve 1 wk  -MQ      Transfer Training PT LTG, Activity Type bed to chair /chair to bed;sit to stand/stand to sit  -MQ      Transfer Training PT LTG, [place] Level moderate assist (50% patient effort);2 person assist required  -MQ      Transfer Training PT LTG, Assist Device walker, rolling  -MQ      Gait Training PT LTG    Gait Training Goal PT LTG, Date Established 04/18/17  -MQ      Gait Training Goal PT LTG, Time to Achieve 1 wk  -MQ      Gait Training Goal PT LTG, [place] Level moderate assist (50% patient effort);2 person assist required  -MQ      Gait Training Goal PT LTG, Assist Device walker, rolling  -MQ      Gait Training Goal PT LTG, Distance to Achieve 3  -MQ        User Key  (r) = Recorded By, (t) = Taken By, (c) = Cosigned By    Initials Name Provider Type     Juliet Baumann, PT Physical Therapist          Physical Therapy Education     Title: PT OT SLP Therapies (Active)     Topic: Physical Therapy (Active)     Point: Mobility training (Active)    Learning Progress Summary    Learner Readiness Method Response Comment Documented by Status   Patient Acceptance E,TB,D NR   04/20/17 0918 Active    Acceptance E,TB,D NR   04/19/17 1129 Active    Acceptance E,TB,D NR   04/18/17 1347 Active               Point: Home exercise program (Active)    Learning Progress Summary    Learner Readiness Method Response Comment Documented by Status   Patient Acceptance E,TB,D NR   04/18/17 1347 Active               Point: Body mechanics (Active)    Learning Progress Summary    Learner Readiness Method Response Comment Documented by Status   Patient Acceptance E,TB,D NR   04/20/17 0918 Active    Acceptance E,TB,D NR   04/19/17 1129 Active    Acceptance E,TB,D NR   04/18/17 1347 Active               Point: Precautions (Active)    Learning Progress Summary    Learner Readiness Method Response Comment Documented by Status   Patient Acceptance E,TB,D NR   04/20/17 0918 Active    Acceptance E,TB,D  NR   04/19/17 1129 Active                      User Key     Initials Effective Dates Name Provider Type Discipline     12/11/15 -  Juliet Baumann PT Physical Therapist PT                    PT Recommendation and Plan  Anticipated Equipment Needs At Discharge: two wheeled walker  Anticipated Discharge Disposition: skilled nursing facility  Planned Therapy Interventions: balance training, bed mobility training, gait training, patient/family education, strengthening, transfer training  PT Frequency: daily  Plan of Care Review  Progress: improving  Outcome Summary/Follow up Plan: Pt improved level of independence with bed mobility and transfers today, but is still unable to ambulate secondary to pain in pelvis.          Outcome Measures       04/20/17 0900 04/19/17 1121 04/18/17 1321    How much help from another person do you currently need...    Turning from your back to your side while in flat bed without using bedrails? 2  -MQ 2  -MQ 2  -MQ    Moving from lying on back to sitting on the side of a flat bed without bedrails? 2  -MQ 2  -MQ 2  -MQ    Moving to and from a bed to a chair (including a wheelchair)? 2  -MQ 1  -MQ 1  -MQ    Standing up from a chair using your arms (e.g., wheelchair, bedside chair)? 2  -MQ 2  -MQ 2  -MQ    Climbing 3-5 steps with a railing? 1  -MQ 1  -MQ 1  -MQ    To walk in hospital room? 1  -MQ 1  -MQ 1  -MQ    AM-PAC 6 Clicks Score 10  -MQ 9  -MQ 9  -MQ    Functional Assessment    Outcome Measure Options AM-PAC 6 Clicks Basic Mobility (PT)  -MQ AM-PAC 6 Clicks Basic Mobility (PT)  -MQ AM-PAC 6 Clicks Basic Mobility (PT)  -MQ      User Key  (r) = Recorded By, (t) = Taken By, (c) = Cosigned By    Initials Name Provider Type     Juliet Baumann PT Physical Therapist           Time Calculation:         PT Charges       04/20/17 0919          Time Calculation    Start Time 0839  -      Stop Time 0906  -      Time Calculation (min) 27 min  -      PT Received On 04/20/17  -      PT  - Next Appointment 04/21/17  -      PT Goal Re-Cert Due Date 04/25/17  -        User Key  (r) = Recorded By, (t) = Taken By, (c) = Cosigned By    Initials Name Provider Type     Juliet Baumann PT Physical Therapist          Therapy Charges for Today     Code Description Service Date Service Provider Modifiers Qty    20465651792 HC PT THER PROC EA 15 MIN 4/19/2017 Juliet Baumann, PT GP 1    48386246494 HC PT THER SUPP EA 15 MIN 4/19/2017 Juliet Baumann PT GP 1    40954358400 HC PT THER PROC EA 15 MIN 4/20/2017 Juliet Baumann PT GP 2    83806061354 HC PT THER SUPP EA 15 MIN 4/20/2017 Juliet Baumann, PT GP 2          PT G-Codes  Outcome Measure Options: AM-PAC 6 Clicks Basic Mobility (PT)    Juliet Baumann PT  4/20/2017

## 2017-04-20 NOTE — PROGRESS NOTES
Continued Stay Note  [facility]     Patient Name: Katharine Mitchell  MRN: [id number]  Today's Date: 4/20/2017    Admit Date: 4/17/2017          Discharge Plan       04/20/17 1131    Case Management/Social Work Plan    Plan [facility]    Patient/Family In Agreement With Plan yes    Additional Comments Spoke with Lila with [facility], precert recieved for skilled rehab from [place]. CCP discussed with Armando via outbound call that at this time no cost for ambulance with insurance check, but could not guarantee a bill would not occur. Armando verbalized understanding. Packet given to YUDI. Tanisha BAGLEY/CCP              Discharge Codes     None        Expected Discharge Date and Time     Expected Discharge Date Expected Discharge Time    Apr 20, 2017             Lea Farnsworth RN

## 2017-04-20 NOTE — DISCHARGE SUMMARY
DATE OF ADMISSION: 04/17/2017   DATE OF DISCHARGE: 04/20/2017      DISCHARGE DIAGNOSES:  1.  Fall with resulting superior and inferior left-sided pubic rami fractures.   2.  Thoracic vertebral compression fracture prior to this admission with thoracolumbosacral orthosis (TLSO) brace, per previous neurosurgical recommendations.   3.  Leukocytosis.   4.  Abnormal liver function.   5.  Laceration of the thumb with adequate suture closure in the emergency room with nylon sutures, with suggested removal of these postdischarge in a 7-10 day period from 4/17/17.   6.  Dementia without behavior disorder.   7.  Chronic kidney disease stage 3.   8.  Thrombocytopenia, resolved.     HOSPITAL COURSE: The patient is a pleasant, though significantly demented, 87-year-old female initially admitted by myself due to complaints of left hip pain. She was formerly living in an assisted living facility when she slipped out of bed and suffered superior and inferior left-sided pubic rami fractures. Unfortunately, these are not amenable to any surgical intervention, so orthopedics was not consulted. The patient was made weight-bearing as tolerated and Physical Therapy was asked to evaluate. She just had a recent prior admission in which she had thoracic vertebral compression fracture and was seen by neurosurgical service and was recommended for TLSO brace, which was used while here when the patient was working with PT. Her healing is definitely going to be compounded by osteoporosis. Ultimately, when this patient presented to the hospital, we had some additional issues mainly involving significant elevation of her liver function, to where at admission her AST was 543, her ALT was 484, and her alkaline phosphatase was 520. These were normal on previous hospitalization, although prior to that discharge, there was mild elevation. The patient was taking routine Tylenol on a scheduled basis, and I presume this was most likely  medication-induced, as with removal of Tylenol over the last couple of days by disposition, her ALT has trended down to 137. Her AST is 52, and her alkaline phosphatase has gone down to 269. This patient is otherwise asymptomatic in the sense that she has no aspects of nausea, vomiting or abdominal pain. I also checked a hepatitis panel which was negative and I have discussed this at length with the son, Armando, who is healthcare surrogate and POWER OF . She also presented with some unusual CBC results, in which her white blood cell count at admission was 35 and without antibiotic use, it trended down to 14.97, though on the day of discharge it trended up to 22.75. I have discussed this with the son, Armando, as well in regards to further workup or being more conservative in medical management. He is quite realistic in his mother who is of 87 years age, and does not want to pursue any further biopsy, so I did not consult GI or oncological services while here, but I did discuss the possible increased risk for underlying cancers. I do not feel the patient is dealing with any aspects of infection at this juncture. I gave him the option of further consultation versus being more conservative. He has taken a conservative approach, which is the same thing I would do if this was my mother. I called him on a daily basis and at this juncture, we are going to try to give rehab a chance to see how well she can do, but ultimately I am guarded in regards to overall success, and if she shows significant decline while at rehab, I think Armando at that juncture will be transferring goals of care over to one of comfort and get hospice involved. She has otherwise shown clinical improvement every day while she has been here. She has worked better with PT and her pain has been better controlled. I have used tramadol on an as needed basis for pain control, and she has required minimal dosing of this, but I will provide a prescription  postdischarge. I would avoid Tylenol given use of elevated liver function. We are also using a Lidoderm patch on her back side for her thoracic vertebral compression fracture. All questions have been answered to patient, as well as son prior to disposition and her vital signs are stable, as well as afebrile at this juncture.     DISPOSITION: To rehab.    FOLLOWUP: The patient needs to see PCP post rehab.     DISCHARGE MEDICATIONS:  1.  Tylenol discontinued.  2.  Aspirin 81 mg daily.   3.  Tramadol 50 mg q.8 h. p.r.n. pain, #20 dispensed, no refills.   4.  Lidoderm patch to vertebral compression fracture 12 hours on and 12 hours.  5.  B12, take 1000 mcg daily.   6.  Calcium with vitamin D daily.   7.  Exelon transdermal patch 4.6 mg, change patch daily.   8.  Zantac 150 mg b.i.d.   9.  Vitamin D 1000 units daily.     Greater than 30 minutes was spent organizing discharge.                     Jeovany Johnson MD  LITTLEJOHN:caroline  D:   04/20/2017 11:18:00  T:   04/20/2017 11:36:06  Job ID:   [id number]  Document ID:   [id number]  cc:

## 2017-04-20 NOTE — PLAN OF CARE
Problem: Fall Risk (Adult)  Goal: Absence of Falls  Outcome: Ongoing (interventions implemented as appropriate)    Problem: Patient Care Overview (Adult)  Goal: Plan of Care Review  Outcome: Ongoing (interventions implemented as appropriate)    04/20/17 0618   Patient Care Overview   Progress improving   Outcome Evaluation   Outcome Summary/Follow up Plan Pt. resting fairly well on shift, no acute distress noted. HR elevated at the beginning of shift, 120s-130s but has been markedly lowered for the rest of the shift. Pt. remains very confused, yelling out when needing to turn or be changed. Pt. only complains of pain when turning, otherwise doesn't complain. Pt. is agitated and argumentative at times. Pt. is incontinent of urine. VSS.        Goal: Adult Individualization and Mutuality  Outcome: Ongoing (interventions implemented as appropriate)  Goal: Discharge Needs Assessment  Outcome: Ongoing (interventions implemented as appropriate)    Problem: Orthopaedic Fracture (Adult)  Goal: Signs and Symptoms of Listed Potential Problems Will be Absent or Manageable (Orthopaedic Fracture)  Outcome: Ongoing (interventions implemented as appropriate)    Problem: Skin Integrity Impairment, Risk/Actual (Adult)  Goal: Skin Integrity/Wound Healing  Outcome: Ongoing (interventions implemented as appropriate)

## 2017-04-21 NOTE — SIGNIFICANT NOTE
04/21/17 1621   PT Discharge Summary   Anticipated Discharge Disposition skilled nursing facility   Reason for Discharge Discharge from facility   Outcomes Achieved Refer to plan of care for updates on goals achieved   Discharge Destination SNF

## 2017-04-21 NOTE — PLAN OF CARE
Problem: Inpatient Physical Therapy  Goal: Bed Mobility Goal LTG- PT  Outcome: Unable to achieve outcome(s) by discharge Date Met:  04/21/17 04/21/17 1620   Bed Mobility PT LTG   Bed Mobility PT LTG, Outcome goal not met   Bed Mobility PT LTG, Reason Goal Not Met discharged from facility       Goal: Transfer Training Goal 1 LTG- PT  Outcome: Unable to achieve outcome(s) by discharge Date Met:  04/21/17 04/21/17 1620   Transfer Training PT LTG   Transfer Training PT LTG, Outcome goal not met   Transfer Training PT LTG, Reason Goal Not Met discharged from facility       Goal: Gait Training Goal LTG- PT  Outcome: Unable to achieve outcome(s) by discharge Date Met:  04/21/17 04/21/17 1620   Gait Training PT LTG   Gait Training Goal PT LTG, Outcome goal not met   Gait Training Goal PT LTG, Reason Goal Not Met discharged from facility

## 2017-05-25 PROBLEM — R41.82 ALTERED MENTAL STATUS: Status: ACTIVE | Noted: 2017-01-01

## 2017-05-25 PROBLEM — N39.0 URINARY TRACT INFECTION: Status: ACTIVE | Noted: 2017-01-01

## 2017-05-25 PROBLEM — R53.1 GENERALIZED WEAKNESS: Status: ACTIVE | Noted: 2017-01-01

## 2017-05-29 PROBLEM — F03.90 DEMENTIA (HCC): Status: ACTIVE | Noted: 2017-01-01

## 2017-06-01 NOTE — PLAN OF CARE
Problem: Patient Care Overview (Adult)  Goal: Plan of Care Review  Outcome: Ongoing (interventions implemented as appropriate)    06/01/17 0457   Patient Care Overview   Progress progress toward functional goals as expected   Outcome Evaluation   Outcome Summary/Follow up Plan PRN medications given once. pt rested well throughout the night. perez in place.    Coping/Psychosocial Response Interventions   Plan Of Care Reviewed With patient       Goal: Adult Individualization and Mutuality  Outcome: Ongoing (interventions implemented as appropriate)  Goal: Discharge Needs Assessment  Outcome: Ongoing (interventions implemented as appropriate)    Problem: Dying Patient, Actively (Adult)  Goal: Identify Related Risk Factors and Signs and Symptoms  Outcome: Ongoing (interventions implemented as appropriate)  Goal: Comfort/Pain Control  Outcome: Ongoing (interventions implemented as appropriate)  Goal: Dying Process, Peace and Dignity  Outcome: Ongoing (interventions implemented as appropriate)    Problem: Pain, Acute (Adult)  Goal: Identify Related Risk Factors and Signs and Symptoms  Outcome: Ongoing (interventions implemented as appropriate)  Goal: Acceptable Pain Control/Comfort Level  Outcome: Ongoing (interventions implemented as appropriate)

## 2017-06-01 NOTE — PROGRESS NOTES
Continued Stay Note  [facility]     Patient Name: Katharine Mitchell  MRN: [id number]  Today's Date: 6/1/2017    Admit Date: 5/29/2017          Discharge Plan       06/01/17 1144    Case Management/Social Work Plan    Plan [facility] inpatient hospice    Additional Comments Chart reviewed.  Pt resting comfortably at this time.  Breathing even and unlabored.  No family present at this time.  Pt has required 1 prn of Morphine 1mg and 2 prns of Ativan 0.5mg in last 24 hours.  Pt continues to require general inpatient hospice care for skilled nursing assessment and interventions for symptom mgmt of pain and agitation with scheduled and prn med administration.  If hospice team can be of further assist, call ext [phone].               Discharge Codes     None            Nancy Camacho RN

## 2017-06-01 NOTE — PROGRESS NOTES
"Hospice Progress Note    Patient Name: Katharine Mitchell   : 1929  Sex: female    Code Status: Comfort measures and allow natural death.    Date of Admission: 2017    Subjective:     Pt resting comfortably with eyes closed. Not disturbed with exam.     Multiple prns in last 24 hours:  1 prn of Morphine 1mg  2 prns of Ativan 0.5mg     Increase in urine output noted yesterday    No oral intake recorded two days ago or today; minimal for yesterday (100cc)    BMs daily    Family not present    ROS:  Review of Systems   Unable to perform ROS: Acuity of condition       Reviewed scheduled and prn medications.     •  acetaminophen  •  docusate sodium  •  glycerin  •  lactulose  •  LORazepam  •  Morphine  •  Morphine  •  QUEtiapine  •  sodium chloride  •  Insert peripheral IV **AND** sodium chloride    Objective:   /62 (BP Location: Left arm, Patient Position: Lying)  Pulse 72  Temp 98.3 °F (36.8 °C)  Resp 16  Ht 66\" (167.6 cm)  Wt 106 lb (48.1 kg)  SpO2 94%  BMI 17.11 kg/m2   Intake & Output (last day)        0701 -  0700  0701 -  0700    P.O. 100     Total Intake(mL/kg) 100 (2.1)     Urine (mL/kg/hr) 650 (0.6) 100 (0.4)    Stool 0 (0)     Total Output 650 100    Net -550 -100          Unmeasured Stool Occurrence 1 x         Lab Results (last 24 hours)     ** No results found for the last 24 hours. **        Imaging Results (last 24 hours)     ** No results found for the last 24 hours. **          PPS: Palliative Performance Scale score as of 2017, 12:04 PM is 20% based on the following measures:   Ambulation: Totally bed bound  Activity and Evidence of Disease: Unable to do any work, extensive evidence of disease  Self-Care: Total care  Intake:Minimal sips  LOC: Full, drowsy or confusion    Physical Exam:  Physical Exam   Constitutional: She appears lethargic. She is sleeping. No distress.   HENT:   Temporal wasting   Eyes: Right eye exhibits no discharge. Left eye exhibits " no discharge.   Neck: No JVD present.   Cardiovascular: Normal rate and regular rhythm.    Radial pulses palpable but weaker than yesterday   Pulmonary/Chest: Effort normal and breath sounds normal. No respiratory distress. She has no wheezes.   NAD, shallow, nonlabored, even   Abdominal: Soft. She exhibits no distension. There is no tenderness.   Musculoskeletal: Normal range of motion. She exhibits no edema.   Neurological: She appears lethargic.   Resting; undisturbed with exam -- opened eyes at end of exam, looked around without moving head, then closed them.    Skin: Skin is dry. She is not diaphoretic. There is pallor.   Psychiatric:   Unable to assess       Principal Problem:    Dementia  Active Problems:    Pulmonary interstitial fibrosis    Gait instability    Vertebral artery disease    Thoracic compression fracture    Dementia without behavioral disturbance    Altered mental status    Urinary tract infection    Generalized weakness      Assessment/Plan:     - Schedule po Ativan  - Discuss discharge plan    Total Time with Patient: 20 minutes  Time In: 1150  Time Out: 1210    Time spent reviewing medical record, assessing and examining patient, discussing with family, nursing, answering questions, visiting room/floor, formulating a plan of care, and documentation of care.  > 50% face to face.    Justification for care:  Patient meets criteria for acute in-patient care with required nursing assessment and interventions for symptoms with IV medications.      RICHY Burt  (C) [phone]  (O) [phone]  06/01/17  12:01 PM

## 2017-06-01 NOTE — PLAN OF CARE
Problem: Patient Care Overview (Adult)  Goal: Plan of Care Review  Outcome: Ongoing (interventions implemented as appropriate)    06/01/17 1549   Patient Care Overview   Progress progress toward functional goals as expected   Coping/Psychosocial Response Interventions   Plan Of Care Reviewed With patient         Problem: Dying Patient, Actively (Adult)  Goal: Comfort/Pain Control  Outcome: Ongoing (interventions implemented as appropriate)    06/01/17 1549   Dying Patient, Actively (Adult)   Comfort/Pain Control making progress toward outcome         Problem: Pain, Acute (Adult)  Goal: Acceptable Pain Control/Comfort Level  Outcome: Ongoing (interventions implemented as appropriate)    06/01/17 1549   Pain, Acute (Adult)   Acceptable Pain Control/Comfort Level making progress toward outcome

## 2017-06-02 NOTE — DISCHARGE PLACEMENT REQUEST
"Ailyn Thomas (87 y.o. Female)     From Goldie Calvin RN    [phone]        Date of Birth Social Security Number Address Home Phone MRN    07/29/1929  [address]  [place] [id number] [phone] [id number]    Yarsani Marital Status          None        Admission Date Admission Type Admitting Provider Attending Provider Department, Room/Bed    5/29/17 Elective Coy Arguelles, Coy Castro,  [facility] G[facility], N536/1    Discharge Date Discharge Disposition Discharge Destination                      Attending Provider: Coy Arguelles DO     Allergies:  Alendronate, Amlodipine, Aspirin, Axid [Nizatidine], Azithromycin, Codeine, Donepezil, Estrogens, Fentanyl, Fluticasone, Hydrocodone-acetaminophen, Levofloxacin, Naproxen, Niacin, Nsaids, Omeprazole, Oxycodone, Penicillins, Prednisone, Proton Pump Inhibitors, Rabeprazole, Raloxifene, Risedronate Sodium, Sulfa Antibiotics, Tetracyclines & Related    Isolation:  None   Infection:  None   Code Status:  Comfort Jo    Ht:  66\" (167.6 cm)   Wt:  106 lb (48.1 kg)    Admission Cmt:  None   Principal Problem:  Dementia [F03.90]                 Active Insurance as of 5/29/2017     Primary Coverage     Payor Plan Insurance Group Employer/Plan Group    [facility]      Payor Plan Address Payor Plan Phone Number Effective From Effective To    [address] [phone] 5/28/2017     [place] [address]       Subscriber Name Subscriber Birth Date Member ID       AILYN THOMAS 7/29/1929 [id number]                 Emergency Contacts      (Rel.) Home Phone Work Phone Mobile Phone    Armando Thomas (Son) [phone] -- [phone]    CarlosErin masterson (Daughter) [phone] -- [phone]            Insurance Information                HOSPICE [facility] Phone: [phone]    Subscriber: Ailyn Thomas Subscriber#: " "[id number]    Group#:  Precert#:              History & Physical      Sahara Elliott, APRN at 2017  3:07 PM          Hospice History and Physical     Patient Name:  Katharine Mitchell   : 1929   Sex: female    Patient Care Team:  Goldie Madera MD as PCP - General (Internal Medicine)    Code Status: Comfort measures and allow natural death.    Subjective     Patient is a 87 y.o. female presented to ED with altered mental status on 17. For the past 2-3 days prior to inpt hospital admission, (per family) pt's mentation has decreased and worsened and pt has been agitated and trying to get out of the bed. Son states that she has refused PT therapy for which she had been undergoing for her pelvis and back compression fractures. He also notes that she has had decreased appetite and generalized weakness.    Pt admitted into Hospice on 17.    - no oral intake yesterday; bites today    - Lots of medication allergies    Review of Systems  Review of Systems   Unable to perform ROS: Acuity of condition   Constitutional:        Pt states, \"I'm all right. I don't need anything. I'm just not feeling up to anything.\"  Denies pain   Respiratory: Negative for shortness of breath.    Gastrointestinal:        Unable to say   Neurological: Positive for weakness.   Psychiatric/Behavioral: Negative for hallucinations and sleep disturbance.       History  Past Medical History:   Diagnosis Date   • Abdominal pain, LLQ (left lower quadrant)    • Abdominal pain, RLQ    • Abnormal kidney function study 2015    PT myra bumped up in Feb. Had a contrasted CT in  which liikely contributed. I had spoken with her GYN who had been prescribing Boniva when he contacted me with her labs. I recommended holding off giving her low creatinine clearance. He was supposed to discuss this with her.   • Abnormal TSH 2015    Recheck today.   • Anxiety     controlled sub agreement signed 14   • Aortic regurgitation  "   • Arthritis of hand, right    • Atrophy of right kidney    • Change in bowel movement    • CKD (chronic kidney disease) stage 3, GFR 30-59 ml/min    • Dementia    • Diverticulosis of large intestine     extensive   • Dizziness    • Fatigue    • GERD (gastroesophageal reflux disease)    • H/O colonoscopy     Complete (Fiberoptic)   • Hip pain, right    • Long Q-T syndrome    • Memory changes    • Osteoporosis     This has been managed by Dr. Grimes.  Her last DEXA 11/5/2012.  She had been on IV Boniva for quite a long time.   • Personal history of coronary atherosclerosis    • Personal history of urinary tract infection 06/16/2014    Repeat urine today.   • Pneumonia    • Prediabetes    • Shoulder pain, left    • Sinus bradycardia    • Vertigo 11/03/2014    Refill Meclizine for use as needed.   • Vitamin B12 deficiency      Past Surgical History:   Procedure Laterality Date   • BREAST SURGERY      Biopsy, Open   • CARDIAC CATHETERIZATION     • CHOLECYSTECTOMY     • EYE SURGERY      Cataract     Current Facility-Administered Medications   Medication Dose Route Frequency Provider Last Rate Last Dose   • acetaminophen (TYLENOL) tablet 650 mg  650 mg Oral Q4H PRN Coy Arguelles, DO       • docusate sodium (COLACE) capsule 100 mg  100 mg Oral BID PRN Coy Arguelles, DO       • escitalopram (LEXAPRO) tablet 10 mg  10 mg Oral Daily Coy Arguelles, DO   10 mg at 05/30/17 0827   • famotidine (PEPCID) tablet 20 mg  20 mg Oral BID Coy Arguelles, DO   20 mg at 05/30/17 0827   • glycerin (ADULT) rectal suppository 2 g  2 g Rectal Daily PRN Coy Arguelles, DO       • lactulose (CHRONULAC) 10 GM/15ML solution 20 g  20 g Oral Daily PRN Coy Arguelles, DO       • lisinopril (PRINIVIL,ZESTRIL) tablet 10 mg  10 mg Oral Q24H Coy Arguelles, DO   10 mg at 05/30/17 0827   • LORazepam (ATIVAN) injection 0.5 mg  0.5 mg Intravenous Q4H PRN Coy Arguelles, DO       • Morphine 20 MG/ML concentrated solution 5 mg  5 mg Oral Q4H PRN  Coy Arguelles, DO       • Morphine sulfate (PF) injection 1 mg  1 mg Intravenous Q1H PRN Coy Arguelles, DO   1 mg at 05/30/17 1211   • QUEtiapine (SEROquel) tablet 25 mg  25 mg Oral Nightly PRN Coy Arguelles, DO       • sodium chloride 0.9 % flush 1-10 mL  1-10 mL Intravenous PRN Coy Arguelles, DO       • sodium chloride 0.9 % flush 10 mL  10 mL Intravenous PRN oCy Arguelles, DO            •  acetaminophen  •  docusate sodium  •  glycerin  •  lactulose  •  LORazepam  •  Morphine  •  Morphine  •  QUEtiapine  •  sodium chloride  •  Insert peripheral IV **AND** sodium chloride  Allergies   Allergen Reactions   • Alendronate    • Amlodipine    • Aspirin    • Axid [Nizatidine]    • Azithromycin    • Codeine    • Donepezil      Upset stomach   • Estrogens    • Fentanyl    • Fluticasone    • Hydrocodone-Acetaminophen    • Levofloxacin    • Naproxen    • Niacin    • Nsaids    • Omeprazole    • Other    • Oxycodone    • Penicillins    • Prednisone    • Proton Pump Inhibitors    • Rabeprazole    • Raloxifene    • Risedronate Sodium    • Sulfa Antibiotics    • Tetracyclines & Related      Family History   Problem Relation Age of Onset   • Dementia Mother    • Heart attack Father      Acute Myocardial Infarction   • Coronary artery disease Father    • Hypertension Father      Social History     Social History   • Marital status:      Spouse name: N/A   • Number of children: N/A   • Years of education: N/A     Occupational History   • Not on file.     Social History Main Topics   • Smoking status: Never Smoker   • Smokeless tobacco: Not on file   • Alcohol use Yes      Comment: social    • Drug use: No   • Sexual activity: Defer     Other Topics Concern   • Not on file     Social History Narrative       Objective     Vital Signs  Temp:  [98 °F (36.7 °C)-98.7 °F (37.1 °C)] 98.7 °F (37.1 °C)  Heart Rate:  [75-81] 75  Resp:  [16] 16  BP: (128-134)/(67-83) 134/68    PPS: Palliative Performance Scale score as of  5/30/2017, 3:11 PM is 20% based on the following measures:   Ambulation: Totally bed bound  Activity and Evidence of Disease: Unable to do any work, extensive evidence of disease  Self-Care: Total care  Intake:Minimal sips  LOC: Full, drowsy or confusion      Physical Exam:  Physical Exam   Constitutional: She appears lethargic. She appears cachectic. No distress.   HENT:   Head: Normocephalic and atraumatic.   Eyes: EOM are normal.   Neck: No JVD present.   Cardiovascular: Normal rate.    No murmur heard.  Pulmonary/Chest: No respiratory distress. She has no wheezes.   Abdominal: Soft. She exhibits no distension. There is no tenderness.   Musculoskeletal: She exhibits no edema or deformity.   Neurological: She appears lethargic.   Speech is deliberate; answered questions appropriately.   Slow movements with arms. Held my hand. Maintained eye contact. Followed sounds with eyes. Appears tired.    Skin: Skin is dry. She is not diaphoretic. There is pallor.   Psychiatric: Her mood appears not anxious. She is slowed.       Results Review:   Lab Results   Component Value Date    GLUCOSE 88 05/26/2017    BUN 15 05/26/2017    CREATININE 0.80 05/26/2017    EGFRIFNONA 68 05/26/2017    EGFRIFAFRI 51 (L) 04/05/2017    BCR 18.8 05/26/2017    K 3.6 05/26/2017    CO2 25.0 05/26/2017    CALCIUM 8.5 (L) 05/26/2017    PROTENTOTREF 7.7 04/05/2017    ALBUMIN 3.40 05/25/2017    LABIL2 0.8 (L) 05/25/2017    AST 36 (H) 05/25/2017    ALT 17 05/25/2017       Principal Problem:    Dementia  Active Problems:    Pulmonary interstitial fibrosis    Gait instability    Vertebral artery disease    Thoracic compression fracture    Dementia without behavioral disturbance    Altered mental status    Urinary tract infection    Generalized weakness      Assessment/Plan   Assessment/Plan:  - medication consolidation -- pt with varying levels of alertness -- was able to take po medications today -- see if this continues  - monitor prn usage -- needed  "prn pain medication this afternoon    Total Time with Patient: 45 minutes  Time In: 1440  Time Out: 1525    Time spent reviewing medical record, assessing and examining patient, discussing with family, nursing, answering questions, visiting room/floor, formulating a plan of care, and documentation of care.  > 50% face to face.    Justification for care:  Patient meets criteria for acute in-patient care with required nursing assessment and interventions for symptoms with IV medications.      RICHY Burt  C) [phone]  (O) [phone]  17  3:10 PM     Electronically signed by RICHY Burt at 2017  3:29 PM           Physician Progress Notes (most recent note)      RICHY Burt at 2017 10:58 AM  Version 1 of 1         Hospice Progress Note    Patient Name: Katharine Mitchell   : 1929  Sex: female    Code Status: Comfort measures and allow natural death.    Date of Admission: 2017    Subjective:    Pt resting comfortably, eyes closed. Will open eyes to name. Denies pain. States \"no\" when asked if she needs anything. The response is mumbled when asked if she knows where she is.     Pt looks comfortable. Room is darker than in the past few days; pt resting. At one point she did wake up and sit up in bed; took a few bites of applesauce. Put her hand behind her head.    Ativan po scheduled yesterday; po morphine elixir already scheduled day before    Needed one prn morphine yesterday at 1650    NurseKristan, ext [phone]    ROS:  Review of Systems   Unable to perform ROS: Acuity of condition   Constitutional:        ROS limited 2/2 pt mostly nonverbal   Gastrointestinal: Negative for abdominal distention, abdominal pain, nausea and vomiting.   Musculoskeletal: Negative for back pain.       Reviewed scheduled and prn medications.     •  acetaminophen  •  docusate sodium  •  glycerin  •  lactulose  •  LORazepam  •  Morphine  •  Morphine  •  QUEtiapine  •  sodium " "chloride  •  Insert peripheral IV **AND** sodium chloride    Objective:   /62 (BP Location: Left arm, Patient Position: Lying)  Pulse 72  Temp 98.3 °F (36.8 °C)  Resp 16  Ht 66\" (167.6 cm)  Wt 106 lb (48.1 kg)  SpO2 94%  BMI 17.11 kg/m2   Intake & Output (last day)       06/01 0701 - 06/02 0700 06/02 0701 - 06/03 0700    P.O. 25     Total Intake(mL/kg) 25 (0.5)     Urine (mL/kg/hr) 375 (0.3)     Stool      Total Output 375      Net -350                Lab Results (last 24 hours)     ** No results found for the last 24 hours. **        Imaging Results (last 24 hours)     ** No results found for the last 24 hours. **          PPS: Palliative Performance Scale score as of 6/2/2017, 10:59 AM is 20% based on the following measures:   Ambulation: Totally bed bound  Activity and Evidence of Disease: Unable to do any work, extensive evidence of disease  Self-Care: Total care  Intake:Minimal sips  LOC: Full, drowsy or confusion    Physical Exam:  Physical Exam   Constitutional: She appears cachectic. No distress.   HENT:   Oropharynx dry   Eyes: EOM are normal.   Neck: No JVD present.   Cardiovascular: Normal rate and regular rhythm.  Exam reveals no friction rub.    Pulmonary/Chest: Effort normal and breath sounds normal. No respiratory distress. She has no wheezes.   Abdominal: Soft. She exhibits no distension.   Hypoactive BS   Musculoskeletal: She exhibits no edema.   Neurological:   LOC fluctuates; confused when awake -- baseline   Skin: Skin is dry. She is not diaphoretic. There is pallor.   Psychiatric:   Unable to asses     Principal Problem:    Dementia  Active Problems:    Pulmonary interstitial fibrosis    Gait instability    Vertebral artery disease    Thoracic compression fracture    Dementia without behavioral disturbance    Altered mental status    Urinary tract infection    Generalized weakness      Assessment/Plan:     Scheduling po ativan in addition to po morphine effective as only needed one " prn (which was morphine at 1650) after scheduling of both    Plan for discharge on Monday to [place]    Will need scripts for discharge    Total Time with Patient: 20 minutes  Time In: 1055  Time Out: 1115    Time spent reviewing medical record, assessing and examining patient, discussing with family, nursing, answering questions, visiting room/floor, formulating a plan of care, and documentation of care.  100% face to face.    Justification for care:  Patient meets criteria for acute in-patient care with required nursing assessment and interventions for symptoms with IV medications.      RICHY Burt  (C) [phone]  (O) [phone]  06/02/17  10:58 AM     Electronically signed by RICHY Burt at 6/2/2017 11:12 AM        Consult Notes (most recent note)     No notes of this type exist for this encounter.           Nutrition Notes (most recent note)      Deepthi Wick, HE, LD at 5/31/2017 10:16 AM  Version 1 of 1         Adult Nutrition  Assessment/PES    Patient Name:  Katharine Mitchell  YOB: 1929  MRN: [id number]  Admit Date:  5/29/2017    Assessment Date:  5/31/2017   Comments:  Pt now in patient Hospice patient, comfort measures only. RD to sign off, will remain available PRN.         Reason for Assessment       05/31/17 1008    Reason for Assessment    Reason For Assessment/Visit follow up protocol    Time Spent (min) 20    Diagnosis Diagnosis   Per notes this adm/MD diagnosis list noted   Principal Problem:    Dementia  Active Problems:    Pulmonary interstitial fibrosis    Gait instability    Vertebral artery disease    Thoracic compression fracture    Dementia without behavioral disturbance    Altered mental status    Urinary tract infection    Generalized weakness              Labs/Tests/Procedures/Meds       05/31/17 1014    Labs/Tests/Procedures/Meds    Labs/Tests Review Reviewed   None    Medication Review Reviewed, pertinent                Nutrition Prescription  Ordered       05/31/17 1014    Nutrition Prescription PO    Current PO Diet Dysphagia    Dysphagia Level 3  Pureed with some mashed    Fluid Consistency Nectar/syrup thick    Supplement Magic Cup    Supplement Frequency 3 times a day            Evaluation of Received Nutrient/Fluid Intake       05/31/17 1015    PO Evaluation    Number of Meals 4    % PO Intake 6.25              Problem/Interventions:        Problem 1       05/31/17 1015    Nutrition Diagnoses Problem 1    Problem 1 No Nutrition Diagnosis at this Time                    Intervention Goal       05/31/17 1015    Intervention Goal    General Hospice Care            Nutrition Intervention       05/31/17 1015    Nutrition Intervention    RD/Tech Action Follow Tx progress;Care plan reviewd              Education/Evaluation       05/31/17 1015    Monitor/Evaluation    Monitor Per protocol          Electronically signed by:  Deepthi Wick RDN, LD  05/31/17 10:16 AM     Electronically signed by Deepthi Wick RDN, LD at 5/31/2017 10:22 AM

## 2017-06-02 NOTE — PROGRESS NOTES
"Hospice Progress Note    Patient Name: Katharine Mitchell   : 1929  Sex: female    Code Status: Comfort measures and allow natural death.    Date of Admission: 2017    Subjective:    Pt resting comfortably, eyes closed. Will open eyes to name. Denies pain. States \"no\" when asked if she needs anything. The response is mumbled when asked if she knows where she is.     Pt looks comfortable. Room is darker than in the past few days; pt resting. At one point she did wake up and sit up in bed; took a few bites of applesauce. Put her hand behind her head.    Ativan po scheduled yesterday; po morphine elixir already scheduled day before    Needed one prn morphine yesterday at 1650    Nurse, Kristan, ext [phone]    ROS:  Review of Systems   Unable to perform ROS: Acuity of condition   Constitutional:        ROS limited 2/2 pt mostly nonverbal   Gastrointestinal: Negative for abdominal distention, abdominal pain, nausea and vomiting.   Musculoskeletal: Negative for back pain.       Reviewed scheduled and prn medications.     •  acetaminophen  •  docusate sodium  •  glycerin  •  lactulose  •  LORazepam  •  Morphine  •  Morphine  •  QUEtiapine  •  sodium chloride  •  Insert peripheral IV **AND** sodium chloride    Objective:   /62 (BP Location: Left arm, Patient Position: Lying)  Pulse 72  Temp 98.3 °F (36.8 °C)  Resp 16  Ht 66\" (167.6 cm)  Wt 106 lb (48.1 kg)  SpO2 94%  BMI 17.11 kg/m2   Intake & Output (last day)        07 -  0700  07 -  0700    P.O. 25     Total Intake(mL/kg) 25 (0.5)     Urine (mL/kg/hr) 375 (0.3)     Stool      Total Output 375      Net -350                Lab Results (last 24 hours)     ** No results found for the last 24 hours. **        Imaging Results (last 24 hours)     ** No results found for the last 24 hours. **          PPS: Palliative Performance Scale score as of 2017, 10:59 AM is 20% based on the following measures:   Ambulation: Totally bed " bound  Activity and Evidence of Disease: Unable to do any work, extensive evidence of disease  Self-Care: Total care  Intake:Minimal sips  LOC: Full, drowsy or confusion    Physical Exam:  Physical Exam   Constitutional: She appears cachectic. No distress.   HENT:   Oropharynx dry   Eyes: EOM are normal.   Neck: No JVD present.   Cardiovascular: Normal rate and regular rhythm.  Exam reveals no friction rub.    Pulmonary/Chest: Effort normal and breath sounds normal. No respiratory distress. She has no wheezes.   Abdominal: Soft. She exhibits no distension.   Hypoactive BS   Musculoskeletal: She exhibits no edema.   Neurological:   LOC fluctuates; confused when awake -- baseline   Skin: Skin is dry. She is not diaphoretic. There is pallor.   Psychiatric:   Unable to asses     Principal Problem:    Dementia  Active Problems:    Pulmonary interstitial fibrosis    Gait instability    Vertebral artery disease    Thoracic compression fracture    Dementia without behavioral disturbance    Altered mental status    Urinary tract infection    Generalized weakness      Assessment/Plan:     Scheduling po ativan in addition to po morphine effective as only needed one prn (which was morphine at 1650) after scheduling of both    Plan for discharge on Monday to [place]    Will need scripts for discharge    Total Time with Patient: 20 minutes  Time In: 1055  Time Out: 1115    Time spent reviewing medical record, assessing and examining patient, discussing with family, nursing, answering questions, visiting room/floor, formulating a plan of care, and documentation of care.  100% face to face.    Justification for care:  Patient meets criteria for acute in-patient care with required nursing assessment and interventions for symptoms with IV medications.      RICHY Burt  (C) [phone]  (O) [phone]  06/02/17  10:58 AM

## 2017-06-02 NOTE — PROGRESS NOTES
Continued Stay Note  [facility]     Patient Name: Katharine Mitchell  MRN: [id number]  Today's Date: 6/2/2017    Admit Date: 5/29/2017          Discharge Plan       06/02/17 1626    Case Management/Social Work Plan    Plan [facility]    Additional Comments Called and left message with Lila at [facility] regarding disposition plan.  Ambulance scheduled for 1400 on Monday, June 5th.  Will continue to follow pt and keep her inpatient hospice if she is requiring frequent symptom mgmt.  Spoke with son, Armando and he is aware of the plan for pt to go back to [facility] on Monday with home hospice.        06/02/17 1346    Case Management/Social Work Plan    Plan [facility] hospice pt    Additional Comments Chart reviewed.  Pt resting with eyes closed.  Opens eyes to name.  Denies pain.  Allows RN to brush her teeth.  Spoke with son yesterday regarding disposition.  Pt changed from [facility] (general inpatient) to [facility] (routine home care).  Plan is for pt to return to [facility] on Monday with hospice as long as her symptoms are still managed.  If hospice team can be of further assist, call ext [phone].               Discharge Codes     None            Nancy Camacho, YUDI

## 2017-06-02 NOTE — PLAN OF CARE
Problem: Patient Care Overview (Adult)  Goal: Plan of Care Review  Outcome: Ongoing (interventions implemented as appropriate)    06/01/17 2000 06/02/17 0318   Outcome Evaluation   Outcome Summary/Follow up Plan --  No prn meds required during the night. Rested well. [place].   Coping/Psychosocial Response Interventions   Plan Of Care Reviewed With patient --        Goal: Discharge Needs Assessment  Outcome: Ongoing (interventions implemented as appropriate)    Problem: Dying Patient, Actively (Adult)  Goal: Identify Related Risk Factors and Signs and Symptoms  Outcome: Ongoing (interventions implemented as appropriate)  Goal: Dying Process, Peace and Dignity  Outcome: Ongoing (interventions implemented as appropriate)    Problem: Pain, Acute (Adult)  Goal: Identify Related Risk Factors and Signs and Symptoms  Outcome: Ongoing (interventions implemented as appropriate)

## 2017-06-02 NOTE — PROGRESS NOTES
Continued Stay Note  [facility]     Patient Name: Katharine Mitchell  MRN: [id number]  Today's Date: 6/2/2017    Admit Date: 5/29/2017          Discharge Plan       06/02/17 1346    Case Management/Social Work Plan    Plan [facility] hospice pt    Additional Comments Chart reviewed.  Pt resting with eyes closed.  Opens eyes to name.  Denies pain.  Allows RN to brush her teeth.  Spoke with son yesterday regarding disposition.  Pt changed from [facility]IP (general inpatient) to [facility] (routine home care).  Plan is for pt to return to [facility] on Monday with hospice as long as her symptoms are still managed.  If hospice team can be of further assist, call ext [phone].       06/02/17 1241    Case Management/Social Work Plan    Plan To [facility] on Monday    Patient/Family In Agreement With Plan yes    Additional Comments I have left a message with Lila at [facility] regarding possible transfer on Monday. Will fax medical records to her and follow up on Monday.               Discharge Codes     None            Nancy Camacho RN

## 2017-06-02 NOTE — PLAN OF CARE
Problem: Patient Care Overview (Adult)  Goal: Plan of Care Review  Outcome: Ongoing (interventions implemented as appropriate)    06/02/17 1446   Patient Care Overview   Progress no change   Outcome Evaluation   Outcome Summary/Follow up Plan No signs of pain or anxiety, resting well. No interest in eating. Sumner cath patent, UOP decreasing.    Coping/Psychosocial Response Interventions   Plan Of Care Reviewed With patient       Goal: Adult Individualization and Mutuality  Outcome: Ongoing (interventions implemented as appropriate)  Goal: Discharge Needs Assessment  Outcome: Ongoing (interventions implemented as appropriate)    Problem: Dying Patient, Actively (Adult)  Goal: Identify Related Risk Factors and Signs and Symptoms  Outcome: Ongoing (interventions implemented as appropriate)  Goal: Comfort/Pain Control  Outcome: Ongoing (interventions implemented as appropriate)  Goal: Dying Process, Peace and Dignity  Outcome: Ongoing (interventions implemented as appropriate)    Problem: Pain, Acute (Adult)  Goal: Identify Related Risk Factors and Signs and Symptoms  Outcome: Ongoing (interventions implemented as appropriate)  Goal: Acceptable Pain Control/Comfort Level  Outcome: Ongoing (interventions implemented as appropriate)

## 2017-06-02 NOTE — PROGRESS NOTES
Continued Stay Note  [facility]     Patient Name: Katharine Mitchell  MRN: [id number]  Today's Date: 6/2/2017    Admit Date: 5/29/2017          Discharge Plan       06/02/17 1241    Case Management/Social Work Plan    Plan To [place] on Monday    Patient/Family In Agreement With Plan yes    Additional Comments I have left a message with Lila at [facility] regarding possible transfer on Monday. Will fax medical records to her and follow up on Monday.               Discharge Codes     None            Goldie Calvin RN

## 2017-06-03 NOTE — PLAN OF CARE
Problem: Patient Care Overview (Adult)  Goal: Plan of Care Review  Outcome: Ongoing (interventions implemented as appropriate)    06/03/17 0353   Patient Care Overview   Progress no change   Outcome Evaluation   Outcome Summary/Follow up Plan pt rested well during shift, no s/s of pain or anxiety, vss   Coping/Psychosocial Response Interventions   Plan Of Care Reviewed With spouse       Goal: Adult Individualization and Mutuality  Outcome: Ongoing (interventions implemented as appropriate)  Goal: Discharge Needs Assessment  Outcome: Ongoing (interventions implemented as appropriate)    Problem: Dying Patient, Actively (Adult)  Goal: Identify Related Risk Factors and Signs and Symptoms  Outcome: Ongoing (interventions implemented as appropriate)  Goal: Comfort/Pain Control  Outcome: Ongoing (interventions implemented as appropriate)  Goal: Dying Process, Peace and Dignity  Outcome: Ongoing (interventions implemented as appropriate)    Problem: Pain, Acute (Adult)  Goal: Identify Related Risk Factors and Signs and Symptoms  Outcome: Ongoing (interventions implemented as appropriate)  Goal: Acceptable Pain Control/Comfort Level  Outcome: Ongoing (interventions implemented as appropriate)

## 2017-06-03 NOTE — PROGRESS NOTES
Pt sleeping, appears comfortable, no s/s of distress or discomfort noted.  RN denies needs. Current scheduled meds are effective in symptom management.  No prns. Continue current [facility] level of care. Discussed with nursing, please call for needs.

## 2017-06-03 NOTE — PROGRESS NOTES
"Continued Stay Note  [facility]     Patient Name: Katharine Mitchell  MRN: [id number]  Today's Date: 6/3/2017    Admit Date: 5/29/2017          Discharge Plan       06/03/17 1151    Case Management/Social Work Plan    Plan inpatient hospice    Additional Comments chart reviewed; patient resting with eyes closed. Does not open eyes to communication. Staff nurse had recently given PRN ativan, patient was awake this morning trying to get out of bed to \"make everying breakfast\" very confused. Plan is for patient to return to [place] on Monday with hospice services as long as symptoms are still managed on current regimen. If hospice team is needed please call ext [phone]              Discharge Codes     None            Shawnee Chandler RN    "

## 2017-06-03 NOTE — PLAN OF CARE
Problem: Dying Patient, Actively (Adult)  Goal: Identify Related Risk Factors and Signs and Symptoms  Outcome: Ongoing (interventions implemented as appropriate)    05/29/17 1520   Dying Patient, Actively   Actively Dying Patient: Related Risk Factors emotional state;delirium   Signs and Symptoms (Actively Dying Patient) agitation/restlessness;decreased attention span       Goal: Comfort/Pain Control  Outcome: Ongoing (interventions implemented as appropriate)  Goal: Dying Process, Peace and Dignity  Outcome: Ongoing (interventions implemented as appropriate)    Problem: Pain, Acute (Adult)  Goal: Acceptable Pain Control/Comfort Level  Outcome: Ongoing (interventions implemented as appropriate)

## 2017-06-04 NOTE — PROGRESS NOTES
"Pt drowsy but awake.  States \"Im feeling pretty good today\".  Denies concerns or c/o at this time.  Discussed with nursing  Denies needs. LBM 6/2/17. Continues to eat/drink few bites/sips per nursing. Pt had 1 prn po morphine in past 24H.  Continue [facility] level of care.  No adjustments to POC indicated at this time.  Please call for needs.  "

## 2017-06-04 NOTE — PLAN OF CARE
Problem: Patient Care Overview (Adult)  Goal: Plan of Care Review  Outcome: Ongoing (interventions implemented as appropriate)    06/03/17 0353 06/04/17 0325   Patient Care Overview   Progress no change --    Outcome Evaluation   Outcome Summary/Follow up Plan --  pt rested weill during shift, no prn medications required during shift   Coping/Psychosocial Response Interventions   Plan Of Care Reviewed With --  patient       Goal: Adult Individualization and Mutuality  Outcome: Ongoing (interventions implemented as appropriate)  Goal: Discharge Needs Assessment  Outcome: Ongoing (interventions implemented as appropriate)    Problem: Dying Patient, Actively (Adult)  Goal: Identify Related Risk Factors and Signs and Symptoms  Outcome: Ongoing (interventions implemented as appropriate)  Goal: Comfort/Pain Control  Outcome: Ongoing (interventions implemented as appropriate)  Goal: Dying Process, Peace and Dignity  Outcome: Ongoing (interventions implemented as appropriate)    Problem: Pain, Acute (Adult)  Goal: Identify Related Risk Factors and Signs and Symptoms  Outcome: Ongoing (interventions implemented as appropriate)  Goal: Acceptable Pain Control/Comfort Level  Outcome: Ongoing (interventions implemented as appropriate)

## 2017-06-04 NOTE — PLAN OF CARE
Problem: Patient Care Overview (Adult)  Goal: Plan of Care Review  Outcome: Ongoing (interventions implemented as appropriate)    06/04/17 9165   Patient Care Overview   Progress no change   Outcome Evaluation   Outcome Summary/Follow up Plan Patient has rested comfortably. Periods of wakefulness and confusion. A few sips of fluid and bites of apple sauce with medication. Skin looks good. Turning in bed. Sumner in place. C/o no pain.   Coping/Psychosocial Response Interventions   Plan Of Care Reviewed With patient       Goal: Adult Individualization and Mutuality  Outcome: Ongoing (interventions implemented as appropriate)  Goal: Discharge Needs Assessment  Outcome: Ongoing (interventions implemented as appropriate)    Problem: Dying Patient, Actively (Adult)  Goal: Identify Related Risk Factors and Signs and Symptoms  Outcome: Ongoing (interventions implemented as appropriate)  Goal: Comfort/Pain Control  Outcome: Ongoing (interventions implemented as appropriate)  Goal: Dying Process, Peace and Dignity  Outcome: Ongoing (interventions implemented as appropriate)    Problem: Pain, Acute (Adult)  Goal: Identify Related Risk Factors and Signs and Symptoms  Outcome: Ongoing (interventions implemented as appropriate)  Goal: Acceptable Pain Control/Comfort Level  Outcome: Ongoing (interventions implemented as appropriate)

## 2017-06-04 NOTE — PROGRESS NOTES
Continued Stay Note  [facility]     Patient Name: Katharine Mitchell  MRN: [id number]  Today's Date: 6/4/2017    Admit Date: 5/29/2017          Discharge Plan       06/04/17 0900    Case Management/Social Work Plan    Plan inpatient hospice note    Additional Comments Chart Reviewed; patient resting comfortably in the bed upon visit. Patient does open eyes to touch and voice, when asked how she was feeling she stated fine, and when asked was she in pain she stated no. Plan is for patient to go to [place] on Monday with hospice services as long as symptoms are still managed on current regimen. Ambulance is currently scheduled for 1400 on 6/5/17. Please call the hospice team ext [phone] with any additional questions/concerns/support              Discharge Codes     None            Shawnee Chandler RN

## 2017-06-05 NOTE — PROGRESS NOTES
"Hospice Progress Note    Patient Name: Katharine Mitchell   : 1929  Sex: female    Code Status: Comfort measures and allow natural death.    Date of Admission: 2017    Subjective:     Pt resting with eyes closed, not as responsive as with previous visits. Opened eyes but nonverbal.     Daughter at bedside this morning     Nurse, Avani, ext [phone]    ROS:  Review of Systems   Unable to perform ROS: Acuity of condition   Constitutional: Negative for fever.        ROS limited    HENT: Positive for trouble swallowing.    Respiratory: Positive for choking (when fed mashed potatoes last night, per daughter). Negative for shortness of breath.    Skin: Positive for color change and pallor.       Reviewed scheduled and prn medications.     •  acetaminophen  •  docusate sodium  •  glycerin  •  LORazepam  •  Morphine  •  Morphine  •  sodium chloride  •  Insert peripheral IV **AND** sodium chloride    Objective:   /60  Pulse 75  Temp 98.3 °F (36.8 °C)  Resp 18  Ht 66\" (167.6 cm)  Wt 106 lb (48.1 kg)  SpO2 94%  BMI 17.11 kg/m2   Intake & Output (last day)        0701 -  0700  0701 -  0700    P.O. 500     Total Intake(mL/kg) 500 (10.4)     Urine (mL/kg/hr) 770 (0.7)     Stool 0 (0)     Total Output 770      Net -270            Unmeasured Stool Occurrence 0 x         Lab Results (last 24 hours)     ** No results found for the last 24 hours. **        Imaging Results (last 24 hours)     ** No results found for the last 24 hours. **          PPS: Palliative Performance Scale score as of 2017, 11:49 AM is 20% based on the following measures:   Ambulation: Totally bed bound  Activity and Evidence of Disease: Unable to do any work, extensive evidence of disease  Self-Care: Total care  Intake:Minimal sips  LOC: Full, drowsy or confusion    Physical Exam:  Physical Exam   Constitutional:   Pt is comfortable but does look different. Not as responsive. Dry mucous membranes. Facial features more " sunken, eyes - the moment they were open - not as bright. Temp differences noted between Right and Left extremities.   HENT:   Head: Atraumatic.   + temporal wasting   Eyes: Right eye exhibits no discharge. Left eye exhibits no discharge.   Neck: No JVD present.   Cardiovascular:   + radial pulse, weak   Pulmonary/Chest: Effort normal and breath sounds normal. No respiratory distress. She has no wheezes.   Abdominal: Soft. She exhibits no distension.   Musculoskeletal: She exhibits no edema.   Neurological:   Opened  Eyes; does not follow commands   Skin: Skin is dry. There is pallor.   Right foot/lower extremity cooler to touch than Left LE.    Psychiatric:   Unable to assess         Principal Problem:    Dementia  Active Problems:    Pulmonary interstitial fibrosis    Gait instability    Vertebral artery disease    Thoracic compression fracture    Dementia without behavioral disturbance    Altered mental status    Urinary tract infection    Generalized weakness      Assessment/Plan:     Long discussion with daughter this morning. Pt's  of 60+ years passed three months ago. Daughter said she knew they would go close together. Pt with definite changes since I saw her on Friday (today is Monday). Pt is not as responsive. Spoke with nurse, pt did take oral medications this morning but is Pocketing food/meds and takes quite a bit of coaxing to swallow. Daughter noted last night pt unable to swallow mashed potatoes from dinner. No choking though noted with thin liquid. Discussed with daughter end of life/actively dying signs and symptoms.     - Discontinue all oral medications at this time  - Change Ativan to IV/SubQ   - Continue the oral morphine  - Reschedule discharge    Total Time with Patient: 60 minutes  Time In: 1030  Time Out: 1130    Time spent reviewing medical record, assessing and examining patient, discussing with family, nursing, answering questions, visiting room/floor, formulating a plan of care,  and documentation of care.  > 50% face to face.    Justification for care:  Patient meets criteria for acute in-patient care with required nursing assessment and interventions for symptoms with IV medications.      RICHY Burt  (C) [phone]  (O) [phone]  06/05/17  11:49 AM

## 2017-06-05 NOTE — PLAN OF CARE
Problem: Patient Care Overview (Adult)  Goal: Plan of Care Review  Outcome: Ongoing (interventions implemented as appropriate)    06/05/17 4625   Patient Care Overview   Progress no change   Outcome Evaluation   Outcome Summary/Follow up Plan Patient resting comfortably in bed. Has had periods of anxiety and crying. PRN medications given. Oral medications discontinued and switched over to IV. IV switched to subQ line. Having trouble swallowing.   Coping/Psychosocial Response Interventions   Plan Of Care Reviewed With patient       Goal: Adult Individualization and Mutuality  Outcome: Ongoing (interventions implemented as appropriate)  Goal: Discharge Needs Assessment  Outcome: Ongoing (interventions implemented as appropriate)    Problem: Dying Patient, Actively (Adult)  Goal: Identify Related Risk Factors and Signs and Symptoms  Outcome: Ongoing (interventions implemented as appropriate)  Goal: Comfort/Pain Control  Outcome: Ongoing (interventions implemented as appropriate)  Goal: Dying Process, Peace and Dignity  Outcome: Ongoing (interventions implemented as appropriate)    Problem: Pain, Acute (Adult)  Goal: Identify Related Risk Factors and Signs and Symptoms  Outcome: Ongoing (interventions implemented as appropriate)  Goal: Acceptable Pain Control/Comfort Level  Outcome: Ongoing (interventions implemented as appropriate)

## 2017-06-05 NOTE — PROGRESS NOTES
Continued Stay Note  [facility]     Patient Name: Katharine Mitchell  MRN: [id number]  Today's Date: 6/5/2017    Admit Date: 5/29/2017          Discharge Plan       06/05/17 1318    Case Management/Social Work Plan    Plan Undetermined    Additional Comments Chart reviewed.  Pt looks much different today than when this hospice RN last assessed (Friday 6/2).  Pallor, hands cool, radial pulses weak.  Nurse, Avani reports that pt had great difficulty taking PO meds this AM.  All PO meds discontinued.  Discharge and transfer to [place] at 1400 cancelled at this time.  Daughter, Erin at bedside.  Discussed change of plan with her and she is thankful and relieved.  Stated she felt the pt was doing worse.  Avani aware of change of plan.  If hospice team can be of further assist, call ext [phone].               Discharge Codes     None            Nancy Camacho RN

## 2017-06-05 NOTE — PLAN OF CARE
Problem: Patient Care Overview (Adult)  Goal: Plan of Care Review  Outcome: Ongoing (interventions implemented as appropriate)    06/04/17 1605 06/04/17 2000 06/05/17 0413   Patient Care Overview   Progress no change --  --    Outcome Evaluation   Outcome Summary/Follow up Plan --  --  Patient resting comfortably during the night. No s/s of pain or agitation. Sumner in place, skin looks good.    Coping/Psychosocial Response Interventions   Plan Of Care Reviewed With --  patient --        Goal: Adult Individualization and Mutuality  Outcome: Ongoing (interventions implemented as appropriate)  Goal: Discharge Needs Assessment  Outcome: Ongoing (interventions implemented as appropriate)    Problem: Dying Patient, Actively (Adult)  Goal: Identify Related Risk Factors and Signs and Symptoms  Outcome: Ongoing (interventions implemented as appropriate)  Goal: Comfort/Pain Control  Outcome: Ongoing (interventions implemented as appropriate)  Goal: Dying Process, Peace and Dignity  Outcome: Ongoing (interventions implemented as appropriate)    Problem: Pain, Acute (Adult)  Goal: Identify Related Risk Factors and Signs and Symptoms  Outcome: Ongoing (interventions implemented as appropriate)  Goal: Acceptable Pain Control/Comfort Level  Outcome: Ongoing (interventions implemented as appropriate)

## 2017-06-06 NOTE — PLAN OF CARE
Problem: Patient Care Overview (Adult)  Goal: Plan of Care Review  Outcome: Ongoing (interventions implemented as appropriate)    06/05/17 1618 06/06/17 0420   Patient Care Overview   Progress no change --    Outcome Evaluation   Outcome Summary/Follow up Plan --  Patient resting comfortably. No events overnight or PRNs required.    Coping/Psychosocial Response Interventions   Plan Of Care Reviewed With patient --        Goal: Adult Individualization and Mutuality  Outcome: Ongoing (interventions implemented as appropriate)  Goal: Discharge Needs Assessment  Outcome: Ongoing (interventions implemented as appropriate)    Problem: Dying Patient, Actively (Adult)  Goal: Identify Related Risk Factors and Signs and Symptoms  Outcome: Ongoing (interventions implemented as appropriate)  Goal: Comfort/Pain Control  Outcome: Ongoing (interventions implemented as appropriate)  Goal: Dying Process, Peace and Dignity  Outcome: Ongoing (interventions implemented as appropriate)    Problem: Pain, Acute (Adult)  Goal: Identify Related Risk Factors and Signs and Symptoms  Outcome: Ongoing (interventions implemented as appropriate)  Goal: Acceptable Pain Control/Comfort Level  Outcome: Ongoing (interventions implemented as appropriate)

## 2017-06-06 NOTE — PROGRESS NOTES
"Hospice Progress Note    Patient Name: Katharine Mitchell   : 1929  Sex: female    Code Status: comfort measures  Documentation reflects advanced care planning previously discussed    Patient reported pain was brought to a comfortable level within 48 hours after initial assessment: YES    Date of Admission: 2017    Subjective:    Pt comfortable. Resting with eyes closed. Did not respond during exam; pt's son stated the same today as well. Pt mumbled to staff earlier.     ROS:  Review of Systems   Unable to perform ROS: Patient unresponsive     Reviewed current scheduled and prn medications for route, type, dose and frequency.     •  acetaminophen  •  glycerin  •  LORazepam  •  Morphine  •  sodium chloride  •  Insert peripheral IV **AND** sodium chloride    Objective:   /74  Pulse 88  Temp 96.8 °F (36 °C)  Resp 14  Ht 66\" (167.6 cm)  Wt 106 lb (48.1 kg)  SpO2 94%  BMI 17.11 kg/m2   Intake & Output (last day)        07 -  07 -  0700    P.O. 400     Total Intake(mL/kg) 400 (8.3)     Urine (mL/kg/hr) 625 (0.5)     Stool      Total Output 625      Net -225                Lab Results (last 24 hours)     ** No results found for the last 24 hours. **        Imaging Results (last 24 hours)     ** No results found for the last 24 hours. **          PPS: Palliative Performance Scale score as of 2017, 3:05 PM is 10% based on the following measures:   Ambulation: Totally bed bound  Activity and Evidence of Disease: Unable to do any work, extensive evidence of disease  Self-Care: Total care  Intake:  Mouth care only  LOC: Drowsy or coma    Physical Exam:  Physical Exam   Constitutional: No distress.   Pt is resting comfortably. NAD. Decreased radial pulse. Decreased air movement throughout. Increased HR (pt no longer taking ACEi... Was not on Bblocker). Decreased LOC. Multiple changes over past 24hrs noted.    Eyes: Right eye exhibits no discharge. Left eye exhibits no " discharge.   Cardiovascular: Normal rate.    Pulmonary/Chest: Effort normal. No stridor.   Abdominal: Soft. She exhibits no distension. There is no tenderness.   Musculoskeletal: She exhibits no edema.   Neurological: She is unresponsive.   Does not follow commands   Skin: Skin is dry. She is not diaphoretic. There is pallor.   Psychiatric:   Unable to assess         Principal Problem:    Dementia  Active Problems:    Pulmonary interstitial fibrosis    Gait instability    Vertebral artery disease    Thoracic compression fracture    Dementia without behavioral disturbance    Altered mental status    Urinary tract infection    Generalized weakness      Assessment/Plan:     Change pt's medications to IV  Increase morphine frequency to q8  Continue to support family - long conversation today with pt's son. Discussed symptoms of actively dying. Answered questions. Pt moving but appearing comfortable. Discussed plan of care with nurse. Family appreciative of care.     Total Time with Patient: 60 minutes  Time In/Out: 5798-8038    Time spent reviewing medical record, assessing and examining patient, discussing with family, nursing, answering questions, visiting room/floor, formulating a plan of care, and documentation of care.  > 50% face to face.    Justification for care:  Patient meets criteria for acute in-patient care with required nursing assessment and interventions for symptoms with IV medications.      Sahara Elliott, RICHY  (C) [phone]  (O) [phone]  06/06/17  3:01 PM

## 2017-06-06 NOTE — PROGRESS NOTES
Continued Stay Note  [facility]     Patient Name: Katharine Mitchell  MRN: [id number]  Today's Date: 6/6/2017    Admit Date: 5/29/2017          Discharge Plan       06/06/17 1327    Case Management/Social Work Plan    Plan [facility] inpatient hospice    Additional Comments Chart reviewed.  Visited with pt and son Armando.  Pt resting quietly with eyes closed.  Stirs and mumbles when spoken to.  Does not open eyes.  Staff nurse at bedside giving scheduled Ativan.  Reported that she had to give pt prn Ativan at beginning of shift for restlessness.  Discussed discharge plan with son and that the decision had been made by hospice team to keep pt at .  She is GIP appropriate and will receive this care for skilled nursing assessment and interventions for symptom mgmt of pain and agitation with IV med administration and monitoring for med effectiveness.  Son states he will call [facility] to take care of bed hold. If hospice team can be of further assist, call ext [phone].               Discharge Codes     None            Nancy Camacho RN

## 2017-06-06 NOTE — PLAN OF CARE
Problem: Patient Care Overview (Adult)  Goal: Plan of Care Review  Outcome: Ongoing (interventions implemented as appropriate)  Goal: Adult Individualization and Mutuality  Outcome: Ongoing (interventions implemented as appropriate)  Goal: Discharge Needs Assessment  Outcome: Ongoing (interventions implemented as appropriate)    Problem: Dying Patient, Actively (Adult)  Goal: Identify Related Risk Factors and Signs and Symptoms  Outcome: Outcome(s) achieved Date Met:  06/06/17  Goal: Comfort/Pain Control  Outcome: Ongoing (interventions implemented as appropriate)  Goal: Dying Process, Peace and Dignity  Outcome: Ongoing (interventions implemented as appropriate)    Problem: Pain, Acute (Adult)  Goal: Identify Related Risk Factors and Signs and Symptoms  Outcome: Outcome(s) achieved Date Met:  06/06/17  Goal: Acceptable Pain Control/Comfort Level  Outcome: Ongoing (interventions implemented as appropriate)

## 2017-06-07 NOTE — PLAN OF CARE
Problem: Patient Care Overview (Adult)  Goal: Plan of Care Review  Outcome: Ongoing (interventions implemented as appropriate)  Goal: Adult Individualization and Mutuality  Outcome: Ongoing (interventions implemented as appropriate)  Goal: Discharge Needs Assessment  Outcome: Ongoing (interventions implemented as appropriate)    Problem: Dying Patient, Actively (Adult)  Goal: Comfort/Pain Control  Outcome: Ongoing (interventions implemented as appropriate)  Goal: Dying Process, Peace and Dignity  Outcome: Ongoing (interventions implemented as appropriate)    Problem: Pain, Acute (Adult)  Goal: Acceptable Pain Control/Comfort Level  Outcome: Ongoing (interventions implemented as appropriate)

## 2017-06-07 NOTE — PLAN OF CARE
Problem: Patient Care Overview (Adult)  Goal: Plan of Care Review  Outcome: Ongoing (interventions implemented as appropriate)    06/07/17 0435   Patient Care Overview   Progress no change   Outcome Evaluation   Outcome Summary/Follow up Plan no s/s of distress noted during shift, pain and anxiety controlled with scheduled medications   Coping/Psychosocial Response Interventions   Plan Of Care Reviewed With patient       Goal: Adult Individualization and Mutuality  Outcome: Ongoing (interventions implemented as appropriate)  Goal: Discharge Needs Assessment  Outcome: Ongoing (interventions implemented as appropriate)    Problem: Dying Patient, Actively (Adult)  Goal: Comfort/Pain Control  Outcome: Ongoing (interventions implemented as appropriate)  Goal: Dying Process, Peace and Dignity  Outcome: Ongoing (interventions implemented as appropriate)    Problem: Pain, Acute (Adult)  Goal: Acceptable Pain Control/Comfort Level  Outcome: Ongoing (interventions implemented as appropriate)

## 2017-06-07 NOTE — PROGRESS NOTES
Continued Stay Note  [facility]     Patient Name: Katharine Mitchell  MRN: [id number]  Today's Date: 6/7/2017    Admit Date: 5/29/2017          Discharge Plan       06/07/17 1339    Case Management/Social Work Plan    Plan [facility] inpatient hospice    Additional Comments Chart reviewed.  Pt resting peacefully.  Unresponsive to RN touch and voice.  Pulses weak and thready.  Breathing shallow and unlabored.  No family present at this time.  Pt continues to require general inpatient hospice care for skilled nursing assessment and interventions for symptom mgmt of pain and agitation with scheduled and prn IV med administration and frequent monitoring of med effectiveness.  If hospice team can be of further assist, call ext [phone].               Discharge Codes     None            Nancy Camacho RN

## 2017-06-07 NOTE — PROGRESS NOTES
"Hospice Progress Note    Patient Name: Katharine Mitchell   : 1929  Sex: female    Code Status: comfort measures    Documentation reflects advanced care planning previously discussed.    Patient reported pain was brought to a comfortable level within 48 hours after initial assessment: YES - pt remains unresponsive    Date of Admission: 2017    Subjective:    Pt resting comfortably in bed; does not respond.     Urine output yesterday 545cc (day prior to day 625cc)    Required prn morphine this morning    No family at bedside    ROS:  Review of Systems    Reviewed current scheduled and prn medications for route, type, dose and frequency.     •  acetaminophen  •  glycerin  •  LORazepam  •  Morphine  •  sodium chloride  •  Insert peripheral IV **AND** sodium chloride    Objective:   /80  Pulse 84  Temp 96.8 °F (36 °C)  Resp 16  Ht 66\" (167.6 cm)  Wt 106 lb (48.1 kg)  SpO2 95%  BMI 17.11 kg/m2   Intake & Output (last day)        07 -  0700  07 -  0700    P.O.      Total Intake(mL/kg)      Urine (mL/kg/hr) 545 (0.5)     Total Output 545      Net -545                Lab Results (last 24 hours)     ** No results found for the last 24 hours. **        Imaging Results (last 24 hours)     ** No results found for the last 24 hours. **          PPS: Palliative Performance Scale score as of 2017, 12:18 PM is 10% based on the following measures:   Ambulation: Totally bed bound  Activity and Evidence of Disease: Unable to do any work, extensive evidence of disease  Self-Care: Total care  Intake:  Mouth care only  LOC: Drowsy or coma      Physical Exam:  Physical Exam   Constitutional: No distress.   Pt appears comfortable. Shallow even breaths. occasional deep breathing. Longer periods of apnea noted today.    HENT:   Head: Normocephalic and atraumatic.   Eyes:   Eyes remain closed   Neck: No JVD present. No tracheal deviation present.   Cardiovascular:   Palpable radial pulses "   Pulmonary/Chest: Effort normal and breath sounds normal. No stridor. No respiratory distress. She has no wheezes. She has no rales.   Abdominal: Soft. She exhibits no distension. There is no tenderness.   Musculoskeletal: She exhibits no edema.   Neurological:   Does not respond   Skin: Skin is dry. She is not diaphoretic. There is pallor.   Psychiatric:   Unable to assess       Principal Problem:    Dementia  Active Problems:    Pulmonary interstitial fibrosis    Gait instability    Vertebral artery disease    Thoracic compression fracture    Dementia without behavioral disturbance    Altered mental status    Urinary tract infection    Generalized weakness      Assessment/Plan:     Continue current plan of care. Monitor prn usage and adjust medications accordingly. Spoke with night shift nurse this morning who observed facial grimacing, which was relieved by prn morphine.     Total Time with Patient: 15 minutes  Time In/Out: 4857-5786    Time spent reviewing medical record, assessing and examining patient, discussing with family, nursing, answering questions, visiting room/floor, formulating a plan of care, and documentation of care.  > 50% face to face.    Justification for care:  Patient meets criteria for acute in-patient care with required nursing assessment and interventions for symptoms with IV medications.      Sahara Elliott, RICHY  (C) [phone]  (O) [phone]  06/07/17  12:14 PM

## 2017-06-08 NOTE — DISCHARGE SUMMARY
Date of Death:  6/8/2017  Time of Death:  1101    Presenting Problem/History of Present Illness  Principal Problem:  Dementia  Active Problems:  Pulmonary interstitial fibrosis  Gait instability  Vertebral artery disease  Thoracic compression fracture  Dementia without behavioral disturbance  Altered mental status  Urinary tract infection  Generalized weakness    Hospital Course  Patient was a 87 y.o. female presented to ED with altered mental status on 5/25/17. For the past 2-3 days prior to inpt hospital admission, (per family) pt's mentation has decreased and worsened and pt has been agitated and trying to get out of the bed. Son states that she has refused PT therapy for which she had been undergoing for her pelvis and back compression fractures. He also notes that she has had decreased appetite and generalized weakness.     Pt admitted into Hospice on 5/29/17.        Consults:   Consults     Date and Time Order Name Status Description    5/26/2017 1601 Inpatient Consult to Palliative Care MD Completed         Examination confirms with auscultation: zero audible heart tones and zero audible respirations. Ms.Ethel LUISA Mitchell's death was pronounced 6/8/17 at 1101. Family, Hospice APRN, and Yu at bedside.  MD notified by Patient's RN.     Antonio Garces RN  Clinical House Supervisor  6/8/2017 11:34 AM    RICHY Burt  06/08/17  12:03 PM

## 2017-06-08 NOTE — SIGNIFICANT NOTE
Examination confirms with auscultation: zero audible heart tones and zero audible respirations. Ms.Ethel LUISA Mitchell's  death was pronounced 6/8/17 at 1101.  Family, Hospice APRN, and Yu at bedside.  MD notified by Patient's RN.    Antonio Garces RN  Clinical House Supervisor  6/8/2017 11:34 AM

## 2017-06-08 NOTE — PROGRESS NOTES
Continued Stay Note  [facility]     Patient Name: Katharine Mitchell  MRN: [id number]  Today's Date: 6/8/2017    Admit Date: 5/29/2017          Discharge Plan       06/08/17 1316    Case Management/Social Work Plan    Plan [facility] inpatient hospice    Additional Comments Chart reviewed.  Visited with patient and son.  Patient tachypneic, grimacing, and moaning.  Feet and knees cool and mottled.  Son, Armando made aware of these changes.  Called Sahara LOCKHART with new orders for an increase in prn Morphine, and scheduled Robinul.  Staff nurse at bedside giving Robinul and made aware that Morphine was being increased.   Patient continues to require general inpatient hospice care for skilled nursing assessment and interventions for symptom mgmt of pain and agitation with scheduled and prn IV med administration and monitoring of effectiveness of these meds.  If hospice team can be of further assist, call ext [phone].               Discharge Codes     None            Nancy Camacho RN

## 2017-06-08 NOTE — PLAN OF CARE
Problem: Patient Care Overview (Adult)  Goal: Plan of Care Review  Outcome: Ongoing (interventions implemented as appropriate)    06/08/17 0319   Patient Care Overview   Progress no change   Outcome Evaluation   Outcome Summary/Follow up Plan asleep most of time, arouses with nursing interventions, will open eyes temporarily, no visual distress or pain noted.   Coping/Psychosocial Response Interventions   Plan Of Care Reviewed With patient       Goal: Adult Individualization and Mutuality  Outcome: Ongoing (interventions implemented as appropriate)  Goal: Discharge Needs Assessment  Outcome: Ongoing (interventions implemented as appropriate)    Problem: Dying Patient, Actively (Adult)  Goal: Comfort/Pain Control  Outcome: Ongoing (interventions implemented as appropriate)  Goal: Dying Process, Peace and Dignity  Outcome: Ongoing (interventions implemented as appropriate)    Problem: Pain, Acute (Adult)  Goal: Acceptable Pain Control/Comfort Level  Outcome: Ongoing (interventions implemented as appropriate)

## 2017-06-08 NOTE — PROGRESS NOTES
"Hospice Progress Note    Patient Name: Katharine Mitchell   : 1929  Sex: female    Code Status: comfort measures    Documentation reflects advanced care planning previously discussed.    Date of Admission: 2017    Subjective:    Restful night - tolerated turn/bath without facial grimacing. Pt's symptoms changed this morning and continue to rapidly change.    Son at bedside.     ROS:  Review of Systems   Unable to perform ROS: Patient unresponsive       Reviewed current scheduled and prn medications for route, type, dose and frequency.     •  acetaminophen  •  glycerin  •  LORazepam **OR** LORazepam  •  Morphine  •  sodium chloride  •  Insert peripheral IV **AND** sodium chloride    Objective:   /82 (BP Location: Right arm, Patient Position: Lying)  Pulse 93  Temp 98.6 °F (37 °C) (Axillary)   Resp 16  Ht 66\" (167.6 cm)  Wt 106 lb (48.1 kg)  SpO2 94%  BMI 17.11 kg/m2   Intake & Output (last day)       701 -  07 07 -  0700    P.O. 0     Total Intake(mL/kg) 0 (0)     Urine (mL/kg/hr) 275 (0.2) 200 (1.1)    Stool  0 (0)    Total Output 275 200    Net -275 -200          Unmeasured Stool Occurrence  1 x        Lab Results (last 24 hours)     ** No results found for the last 24 hours. **        Imaging Results (last 24 hours)     ** No results found for the last 24 hours. **          PPS: Palliative Performance Scale score as of 2017, 11:10 AM is 10% based on the following measures:   Ambulation: Totally bed bound  Activity and Evidence of Disease: Unable to do any work, extensive evidence of disease  Self-Care: Total care  Intake:  Mouth care only  LOC: Drowsy or coma      Physical Exam:  Physical Exam   Constitutional: She appears distressed (at times).   Extremities mottled    Eyes:   Remained closed for the most part - did open during the moment pulse not palpable and remained open while respirations were gasping. Eyes did close closer time of death.    Neck: No JVD " "present. No tracheal deviation present.   Cardiovascular:   Tachy > 120.... Moment when resp and heart tones not palpable but palpable again after about 15 seconds. Pt then gasping for air. Heart tones remained distant. This is when prn medications were ordered.    Pulmonary/Chest: No stridor.   Shallow, tachypneic   Abdominal: Soft.   Musculoskeletal: She exhibits no edema or deformity.   Neurological:   Does not follow commands   Skin: Skin is dry. She is not diaphoretic. There is pallor.   Psychiatric:   Unable to assess       Principal Problem:    Dementia  Active Problems:    Pulmonary interstitial fibrosis    Gait instability    Vertebral artery disease    Thoracic compression fracture    Dementia without behavioral disturbance    Altered mental status    Urinary tract infection    Generalized weakness      Assessment/Plan:     Pt actively dying. Son at bedside. Pt's symptoms changing rapidly while at bedside. Son informed pt death is imminent. Pt overall comfortable but at times was gasping for air, tachycardia. Skin quickly and progressively mottling.     Written after pt  d/t need to be with pt/family during the process:  PRN medication ordered... Fairly simultaneously with administeration Pt passed - nursing and myself at bedside. Peaceful. Son had stepped out of room for a few minutes (was missing a work meeting to stay with pt d/t imminent nature so needed to give colleague some papers) and I called him to give him an update. Pt passed a few minutes before he arrived back in room. SonArmando, accepting of situation. \"I couldn't find my car.\" He and I have had previous conversations about pt's passing when their children have left the room. Support provided. Very thankful for the excellent care from Hospital and Hospice staff.     Total Time with Patient: 75 mintues  Time In/Out: 1015 - 1130    Time spent reviewing medical record, assessing and examining patient, discussing with family, nursing, " answering questions, visiting room/floor, formulating a plan of care, and documentation of care.  > 50% face to face.    Justification for care:  Patient meets criteria for acute in-patient care with required nursing assessment and interventions for symptoms with IV medications.      RICHY Burt  (C) [phone]  (O) [phone]  06/08/17  10:46 AM